# Patient Record
Sex: MALE | Race: WHITE | NOT HISPANIC OR LATINO | Employment: OTHER | URBAN - METROPOLITAN AREA
[De-identification: names, ages, dates, MRNs, and addresses within clinical notes are randomized per-mention and may not be internally consistent; named-entity substitution may affect disease eponyms.]

---

## 2017-01-13 ENCOUNTER — APPOINTMENT (OUTPATIENT)
Dept: LAB | Facility: CLINIC | Age: 62
End: 2017-01-13
Payer: COMMERCIAL

## 2017-01-13 DIAGNOSIS — I10 ESSENTIAL (PRIMARY) HYPERTENSION: ICD-10-CM

## 2017-01-13 DIAGNOSIS — E11.9 TYPE 2 DIABETES MELLITUS WITHOUT COMPLICATIONS (HCC): ICD-10-CM

## 2017-01-13 LAB
ALBUMIN SERPL BCP-MCNC: 3.9 G/DL (ref 3.5–5)
ALP SERPL-CCNC: 80 U/L (ref 46–116)
ALT SERPL W P-5'-P-CCNC: 29 U/L (ref 12–78)
ANION GAP SERPL CALCULATED.3IONS-SCNC: 6 MMOL/L (ref 4–13)
AST SERPL W P-5'-P-CCNC: 10 U/L (ref 5–45)
BILIRUB SERPL-MCNC: 0.53 MG/DL (ref 0.2–1)
BUN SERPL-MCNC: 20 MG/DL (ref 5–25)
CALCIUM SERPL-MCNC: 9 MG/DL (ref 8.3–10.1)
CHLORIDE SERPL-SCNC: 105 MMOL/L (ref 100–108)
CO2 SERPL-SCNC: 28 MMOL/L (ref 21–32)
CREAT SERPL-MCNC: 1.21 MG/DL (ref 0.6–1.3)
EST. AVERAGE GLUCOSE BLD GHB EST-MCNC: 157 MG/DL
GFR SERPL CREATININE-BSD FRML MDRD: >60 ML/MIN/1.73SQ M
GLUCOSE SERPL-MCNC: 163 MG/DL (ref 65–140)
HBA1C MFR BLD: 7.1 % (ref 4.2–6.3)
POTASSIUM SERPL-SCNC: 4.5 MMOL/L (ref 3.5–5.3)
PROT SERPL-MCNC: 7.4 G/DL (ref 6.4–8.2)
SODIUM SERPL-SCNC: 139 MMOL/L (ref 136–145)

## 2017-01-13 PROCEDURE — 36415 COLL VENOUS BLD VENIPUNCTURE: CPT

## 2017-01-13 PROCEDURE — 83036 HEMOGLOBIN GLYCOSYLATED A1C: CPT

## 2017-01-13 PROCEDURE — 80053 COMPREHEN METABOLIC PANEL: CPT

## 2017-01-16 ENCOUNTER — ALLSCRIPTS OFFICE VISIT (OUTPATIENT)
Dept: OTHER | Facility: OTHER | Age: 62
End: 2017-01-16

## 2017-05-16 ENCOUNTER — APPOINTMENT (OUTPATIENT)
Dept: LAB | Facility: CLINIC | Age: 62
End: 2017-05-16
Payer: COMMERCIAL

## 2017-05-16 DIAGNOSIS — I10 ESSENTIAL (PRIMARY) HYPERTENSION: ICD-10-CM

## 2017-05-16 DIAGNOSIS — E11.9 TYPE 2 DIABETES MELLITUS WITHOUT COMPLICATIONS (HCC): ICD-10-CM

## 2017-05-16 LAB
ALBUMIN SERPL BCP-MCNC: 3.8 G/DL (ref 3.5–5)
ALP SERPL-CCNC: 80 U/L (ref 46–116)
ALT SERPL W P-5'-P-CCNC: 22 U/L (ref 12–78)
ANION GAP SERPL CALCULATED.3IONS-SCNC: 5 MMOL/L (ref 4–13)
AST SERPL W P-5'-P-CCNC: 6 U/L (ref 5–45)
BASOPHILS # BLD AUTO: 0.03 THOUSANDS/ΜL (ref 0–0.1)
BASOPHILS NFR BLD AUTO: 1 % (ref 0–1)
BILIRUB SERPL-MCNC: 0.49 MG/DL (ref 0.2–1)
BILIRUB UR QL STRIP: NEGATIVE
BUN SERPL-MCNC: 15 MG/DL (ref 5–25)
CALCIUM SERPL-MCNC: 8.7 MG/DL (ref 8.3–10.1)
CHLORIDE SERPL-SCNC: 104 MMOL/L (ref 100–108)
CHOLEST SERPL-MCNC: 148 MG/DL (ref 50–200)
CLARITY UR: CLEAR
CO2 SERPL-SCNC: 29 MMOL/L (ref 21–32)
COLOR UR: YELLOW
CREAT SERPL-MCNC: 1.2 MG/DL (ref 0.6–1.3)
CREAT UR-MCNC: 184 MG/DL
EOSINOPHIL # BLD AUTO: 0.2 THOUSAND/ΜL (ref 0–0.61)
EOSINOPHIL NFR BLD AUTO: 4 % (ref 0–6)
ERYTHROCYTE [DISTWIDTH] IN BLOOD BY AUTOMATED COUNT: 13.8 % (ref 11.6–15.1)
EST. AVERAGE GLUCOSE BLD GHB EST-MCNC: 160 MG/DL
GFR SERPL CREATININE-BSD FRML MDRD: >60 ML/MIN/1.73SQ M
GLUCOSE P FAST SERPL-MCNC: 173 MG/DL (ref 65–99)
GLUCOSE UR STRIP-MCNC: NEGATIVE MG/DL
HBA1C MFR BLD: 7.2 % (ref 4.2–6.3)
HCT VFR BLD AUTO: 45.9 % (ref 36.5–49.3)
HDLC SERPL-MCNC: 28 MG/DL (ref 40–60)
HGB BLD-MCNC: 15.3 G/DL (ref 12–17)
HGB UR QL STRIP.AUTO: NEGATIVE
KETONES UR STRIP-MCNC: NEGATIVE MG/DL
LDLC SERPL CALC-MCNC: 84 MG/DL (ref 0–100)
LEUKOCYTE ESTERASE UR QL STRIP: NEGATIVE
LYMPHOCYTES # BLD AUTO: 2.2 THOUSANDS/ΜL (ref 0.6–4.47)
LYMPHOCYTES NFR BLD AUTO: 41 % (ref 14–44)
MCH RBC QN AUTO: 33.3 PG (ref 26.8–34.3)
MCHC RBC AUTO-ENTMCNC: 33.3 G/DL (ref 31.4–37.4)
MCV RBC AUTO: 100 FL (ref 82–98)
MICROALBUMIN UR-MCNC: 13.6 MG/L (ref 0–20)
MICROALBUMIN/CREAT 24H UR: 7 MG/G CREATININE (ref 0–30)
MONOCYTES # BLD AUTO: 0.5 THOUSAND/ΜL (ref 0.17–1.22)
MONOCYTES NFR BLD AUTO: 9 % (ref 4–12)
NEUTROPHILS # BLD AUTO: 2.45 THOUSANDS/ΜL (ref 1.85–7.62)
NEUTS SEG NFR BLD AUTO: 45 % (ref 43–75)
NITRITE UR QL STRIP: NEGATIVE
NRBC BLD AUTO-RTO: 0 /100 WBCS
PH UR STRIP.AUTO: 6 [PH] (ref 4.5–8)
PLATELET # BLD AUTO: 167 THOUSANDS/UL (ref 149–390)
PMV BLD AUTO: 10.2 FL (ref 8.9–12.7)
POTASSIUM SERPL-SCNC: 4.9 MMOL/L (ref 3.5–5.3)
PROT SERPL-MCNC: 6.9 G/DL (ref 6.4–8.2)
PROT UR STRIP-MCNC: NEGATIVE MG/DL
RBC # BLD AUTO: 4.6 MILLION/UL (ref 3.88–5.62)
SODIUM SERPL-SCNC: 138 MMOL/L (ref 136–145)
SP GR UR STRIP.AUTO: 1.02 (ref 1–1.03)
TRIGL SERPL-MCNC: 181 MG/DL
UROBILINOGEN UR QL STRIP.AUTO: 0.2 E.U./DL
WBC # BLD AUTO: 5.41 THOUSAND/UL (ref 4.31–10.16)

## 2017-05-16 PROCEDURE — 81003 URINALYSIS AUTO W/O SCOPE: CPT

## 2017-05-16 PROCEDURE — 82043 UR ALBUMIN QUANTITATIVE: CPT

## 2017-05-16 PROCEDURE — 80061 LIPID PANEL: CPT

## 2017-05-16 PROCEDURE — 80053 COMPREHEN METABOLIC PANEL: CPT

## 2017-05-16 PROCEDURE — 85025 COMPLETE CBC W/AUTO DIFF WBC: CPT

## 2017-05-16 PROCEDURE — 83036 HEMOGLOBIN GLYCOSYLATED A1C: CPT

## 2017-05-16 PROCEDURE — 36415 COLL VENOUS BLD VENIPUNCTURE: CPT

## 2017-05-16 PROCEDURE — 82570 ASSAY OF URINE CREATININE: CPT

## 2017-05-22 ENCOUNTER — ALLSCRIPTS OFFICE VISIT (OUTPATIENT)
Dept: OTHER | Facility: OTHER | Age: 62
End: 2017-05-22

## 2017-09-01 DIAGNOSIS — E11.9 TYPE 2 DIABETES MELLITUS WITHOUT COMPLICATIONS (HCC): ICD-10-CM

## 2017-09-01 DIAGNOSIS — I10 ESSENTIAL (PRIMARY) HYPERTENSION: ICD-10-CM

## 2018-01-13 VITALS
DIASTOLIC BLOOD PRESSURE: 90 MMHG | TEMPERATURE: 97.8 F | WEIGHT: 220.04 LBS | RESPIRATION RATE: 16 BRPM | HEART RATE: 79 BPM | SYSTOLIC BLOOD PRESSURE: 160 MMHG | HEIGHT: 68 IN | BODY MASS INDEX: 33.35 KG/M2

## 2018-01-14 VITALS
HEART RATE: 100 BPM | OXYGEN SATURATION: 98 % | WEIGHT: 219.13 LBS | DIASTOLIC BLOOD PRESSURE: 80 MMHG | HEIGHT: 68 IN | SYSTOLIC BLOOD PRESSURE: 160 MMHG | BODY MASS INDEX: 33.21 KG/M2

## 2018-01-18 NOTE — MISCELLANEOUS
Provider Comments  Provider Comments:   Pt was a n/s  SWP he forgot appt, pt did reschedule        Signatures   Electronically signed by : FABI Carty ; Nov  3 2016 10:45PM EST                       (Author)

## 2018-03-07 DIAGNOSIS — I10 HYPERTENSION, ESSENTIAL, BENIGN: ICD-10-CM

## 2018-03-07 DIAGNOSIS — F41.9 ANXIETY: Primary | ICD-10-CM

## 2018-03-07 RX ORDER — METOPROLOL TARTRATE 50 MG/1
TABLET, FILM COATED ORAL
Qty: 90 TABLET | Refills: 5 | Status: SHIPPED | OUTPATIENT
Start: 2018-03-07 | End: 2018-09-16 | Stop reason: SDUPTHER

## 2018-03-07 RX ORDER — ESCITALOPRAM OXALATE 20 MG/1
TABLET ORAL
Qty: 30 TABLET | Refills: 5 | Status: SHIPPED | OUTPATIENT
Start: 2018-03-07 | End: 2018-09-30 | Stop reason: SDUPTHER

## 2018-03-07 NOTE — TELEPHONE ENCOUNTER
Please call him to schedule a visit  I think we or he cancelled in January but has no f/u appt scheduled at this time

## 2018-03-08 DIAGNOSIS — E55.9 VITAMIN D DEFICIENCY: ICD-10-CM

## 2018-03-08 DIAGNOSIS — E66.9 OBESITY WITHOUT SERIOUS COMORBIDITY, UNSPECIFIED CLASSIFICATION, UNSPECIFIED OBESITY TYPE: ICD-10-CM

## 2018-03-08 DIAGNOSIS — E11.9 CONTROLLED TYPE 2 DIABETES MELLITUS WITHOUT COMPLICATION, WITHOUT LONG-TERM CURRENT USE OF INSULIN (HCC): Primary | ICD-10-CM

## 2018-03-08 DIAGNOSIS — I10 BENIGN ESSENTIAL HYPERTENSION: ICD-10-CM

## 2018-03-08 NOTE — TELEPHONE ENCOUNTER
I left a message on voice mail stating that the labs were ordered for blood work  I advised him to call if he needed the orders printed

## 2018-03-08 NOTE — TELEPHONE ENCOUNTER
I spoke with the patient and he made an appoiment for 03/30/18  He asked if you could order blood work  I will call him when complete    Thank You

## 2018-03-28 ENCOUNTER — APPOINTMENT (OUTPATIENT)
Dept: LAB | Facility: CLINIC | Age: 63
End: 2018-03-28
Payer: COMMERCIAL

## 2018-03-28 DIAGNOSIS — E11.9 TYPE 2 DIABETES MELLITUS WITHOUT COMPLICATIONS (HCC): ICD-10-CM

## 2018-03-28 DIAGNOSIS — I10 ESSENTIAL (PRIMARY) HYPERTENSION: ICD-10-CM

## 2018-03-28 LAB
25(OH)D3 SERPL-MCNC: 18.6 NG/ML (ref 30–100)
ALBUMIN SERPL BCP-MCNC: 3.6 G/DL (ref 3.5–5)
ALP SERPL-CCNC: 95 U/L (ref 46–116)
ALT SERPL W P-5'-P-CCNC: 18 U/L (ref 12–78)
ANION GAP SERPL CALCULATED.3IONS-SCNC: 5 MMOL/L (ref 4–13)
AST SERPL W P-5'-P-CCNC: 8 U/L (ref 5–45)
BASOPHILS # BLD AUTO: 0.02 THOUSANDS/ΜL (ref 0–0.1)
BASOPHILS NFR BLD AUTO: 0 % (ref 0–1)
BILIRUB SERPL-MCNC: 0.21 MG/DL (ref 0.2–1)
BUN SERPL-MCNC: 23 MG/DL (ref 5–25)
CALCIUM SERPL-MCNC: 9.1 MG/DL (ref 8.3–10.1)
CHLORIDE SERPL-SCNC: 109 MMOL/L (ref 100–108)
CHOLEST SERPL-MCNC: 164 MG/DL (ref 50–200)
CO2 SERPL-SCNC: 27 MMOL/L (ref 21–32)
CREAT SERPL-MCNC: 1.18 MG/DL (ref 0.6–1.3)
CREAT UR-MCNC: 169 MG/DL
EOSINOPHIL # BLD AUTO: 0.12 THOUSAND/ΜL (ref 0–0.61)
EOSINOPHIL NFR BLD AUTO: 2 % (ref 0–6)
ERYTHROCYTE [DISTWIDTH] IN BLOOD BY AUTOMATED COUNT: 14.2 % (ref 11.6–15.1)
EST. AVERAGE GLUCOSE BLD GHB EST-MCNC: 169 MG/DL
GFR SERPL CREATININE-BSD FRML MDRD: 66 ML/MIN/1.73SQ M
GLUCOSE P FAST SERPL-MCNC: 155 MG/DL (ref 65–99)
HBA1C MFR BLD: 7.5 % (ref 4.2–6.3)
HCT VFR BLD AUTO: 40.3 % (ref 36.5–49.3)
HDLC SERPL-MCNC: 32 MG/DL (ref 40–60)
HGB BLD-MCNC: 13.2 G/DL (ref 12–17)
LDLC SERPL CALC-MCNC: 105 MG/DL (ref 0–100)
LYMPHOCYTES # BLD AUTO: 2.07 THOUSANDS/ΜL (ref 0.6–4.47)
LYMPHOCYTES NFR BLD AUTO: 30 % (ref 14–44)
MCH RBC QN AUTO: 32.3 PG (ref 26.8–34.3)
MCHC RBC AUTO-ENTMCNC: 32.8 G/DL (ref 31.4–37.4)
MCV RBC AUTO: 99 FL (ref 82–98)
MICROALBUMIN UR-MCNC: 15.6 MG/L (ref 0–20)
MICROALBUMIN/CREAT 24H UR: 9 MG/G CREATININE (ref 0–30)
MONOCYTES # BLD AUTO: 0.55 THOUSAND/ΜL (ref 0.17–1.22)
MONOCYTES NFR BLD AUTO: 8 % (ref 4–12)
NEUTROPHILS # BLD AUTO: 4.11 THOUSANDS/ΜL (ref 1.85–7.62)
NEUTS SEG NFR BLD AUTO: 60 % (ref 43–75)
NRBC BLD AUTO-RTO: 0 /100 WBCS
PLATELET # BLD AUTO: 185 THOUSANDS/UL (ref 149–390)
PMV BLD AUTO: 10.2 FL (ref 8.9–12.7)
POTASSIUM SERPL-SCNC: 4.6 MMOL/L (ref 3.5–5.3)
PROT SERPL-MCNC: 7.9 G/DL (ref 6.4–8.2)
RBC # BLD AUTO: 4.09 MILLION/UL (ref 3.88–5.62)
SODIUM SERPL-SCNC: 141 MMOL/L (ref 136–145)
TRIGL SERPL-MCNC: 133 MG/DL
WBC # BLD AUTO: 6.88 THOUSAND/UL (ref 4.31–10.16)

## 2018-03-28 PROCEDURE — 3061F NEG MICROALBUMINURIA REV: CPT | Performed by: INTERNAL MEDICINE

## 2018-03-28 PROCEDURE — 82043 UR ALBUMIN QUANTITATIVE: CPT | Performed by: INTERNAL MEDICINE

## 2018-03-28 PROCEDURE — 36415 COLL VENOUS BLD VENIPUNCTURE: CPT | Performed by: INTERNAL MEDICINE

## 2018-03-28 PROCEDURE — 83036 HEMOGLOBIN GLYCOSYLATED A1C: CPT | Performed by: INTERNAL MEDICINE

## 2018-03-28 PROCEDURE — 82570 ASSAY OF URINE CREATININE: CPT | Performed by: INTERNAL MEDICINE

## 2018-03-28 PROCEDURE — 82306 VITAMIN D 25 HYDROXY: CPT | Performed by: INTERNAL MEDICINE

## 2018-03-28 PROCEDURE — 80053 COMPREHEN METABOLIC PANEL: CPT | Performed by: INTERNAL MEDICINE

## 2018-03-28 PROCEDURE — 85025 COMPLETE CBC W/AUTO DIFF WBC: CPT | Performed by: INTERNAL MEDICINE

## 2018-03-28 PROCEDURE — 80061 LIPID PANEL: CPT

## 2018-03-30 ENCOUNTER — OFFICE VISIT (OUTPATIENT)
Dept: INTERNAL MEDICINE CLINIC | Facility: CLINIC | Age: 63
End: 2018-03-30
Payer: COMMERCIAL

## 2018-03-30 VITALS
SYSTOLIC BLOOD PRESSURE: 126 MMHG | BODY MASS INDEX: 32.86 KG/M2 | HEART RATE: 79 BPM | HEIGHT: 68 IN | DIASTOLIC BLOOD PRESSURE: 80 MMHG | OXYGEN SATURATION: 98 % | WEIGHT: 216.8 LBS

## 2018-03-30 DIAGNOSIS — E66.9 OBESITY WITHOUT SERIOUS COMORBIDITY, UNSPECIFIED CLASSIFICATION, UNSPECIFIED OBESITY TYPE: ICD-10-CM

## 2018-03-30 DIAGNOSIS — G89.29 CHRONIC PAIN OF RIGHT KNEE: ICD-10-CM

## 2018-03-30 DIAGNOSIS — M25.561 CHRONIC PAIN OF RIGHT KNEE: ICD-10-CM

## 2018-03-30 DIAGNOSIS — F17.200 CURRENT SMOKER: ICD-10-CM

## 2018-03-30 DIAGNOSIS — E78.5 DYSLIPIDEMIA: ICD-10-CM

## 2018-03-30 DIAGNOSIS — E11.9 CONTROLLED TYPE 2 DIABETES MELLITUS WITHOUT COMPLICATION, WITHOUT LONG-TERM CURRENT USE OF INSULIN (HCC): Primary | ICD-10-CM

## 2018-03-30 DIAGNOSIS — I10 BENIGN ESSENTIAL HYPERTENSION: ICD-10-CM

## 2018-03-30 DIAGNOSIS — F41.1 GENERALIZED ANXIETY DISORDER: ICD-10-CM

## 2018-03-30 DIAGNOSIS — E55.9 VITAMIN D DEFICIENCY: ICD-10-CM

## 2018-03-30 PROCEDURE — 99214 OFFICE O/P EST MOD 30 MIN: CPT | Performed by: INTERNAL MEDICINE

## 2018-03-30 PROCEDURE — 3008F BODY MASS INDEX DOCD: CPT | Performed by: INTERNAL MEDICINE

## 2018-03-30 PROCEDURE — 3079F DIAST BP 80-89 MM HG: CPT | Performed by: INTERNAL MEDICINE

## 2018-03-30 PROCEDURE — 3074F SYST BP LT 130 MM HG: CPT | Performed by: INTERNAL MEDICINE

## 2018-03-30 RX ORDER — BLOOD-GLUCOSE METER
KIT MISCELLANEOUS
COMMUNITY
Start: 2016-04-14 | End: 2020-05-22 | Stop reason: SDUPTHER

## 2018-03-30 RX ORDER — ATORVASTATIN CALCIUM 20 MG/1
20 TABLET, FILM COATED ORAL DAILY
Qty: 90 TABLET | Refills: 1 | Status: SHIPPED | OUTPATIENT
Start: 2018-03-30 | End: 2018-09-30 | Stop reason: SDUPTHER

## 2018-03-30 RX ORDER — ERGOCALCIFEROL (VITAMIN D2) 1250 MCG
50000 CAPSULE ORAL WEEKLY
Qty: 8 CAPSULE | Refills: 0 | Status: SHIPPED | OUTPATIENT
Start: 2018-03-30 | End: 2019-04-10 | Stop reason: ALTCHOICE

## 2018-03-30 RX ORDER — LANCETS 33 GAUGE
EACH MISCELLANEOUS
COMMUNITY
Start: 2017-09-29 | End: 2020-06-01 | Stop reason: SDUPTHER

## 2018-03-30 RX ORDER — LANCETS
EACH MISCELLANEOUS
COMMUNITY
Start: 2016-04-14 | End: 2020-05-18 | Stop reason: SDUPTHER

## 2018-03-30 RX ORDER — FLUTICASONE PROPIONATE 50 MCG
1 SPRAY, SUSPENSION (ML) NASAL 2 TIMES DAILY
COMMUNITY
Start: 2015-04-21 | End: 2020-10-26 | Stop reason: ALTCHOICE

## 2018-03-30 RX ORDER — ASPIRIN 81 MG/1
1 TABLET, CHEWABLE ORAL DAILY
COMMUNITY

## 2018-03-30 NOTE — PROGRESS NOTES
Assessment/Plan:       Problem List Items Addressed This Visit     Benign essential hypertension     Controlled  Cough from ACE, allergy to ARB marked in previous chart         Relevant Orders    CBC    Comprehensive metabolic panel    Controlled diabetes mellitus (HCC) - Primary     Low sugar carb diet         Relevant Medications    metFORMIN (GLUCOPHAGE) 500 mg tablet    atorvastatin (LIPITOR) 20 mg tablet    Other Relevant Orders    CBC    Comprehensive metabolic panel    HEMOGLOBIN A1C W/ EAG ESTIMATION    Obesity    Vitamin D deficiency     Start 50k weekly x 8 then 1000 units a day         Relevant Medications    ergocalciferol (ERGOCALCIFEROL) 54506 units capsule    Dyslipidemia     Start statin         Relevant Medications    atorvastatin (LIPITOR) 20 mg tablet    Other Relevant Orders    Lipid panel    Chronic pain of right knee    Relevant Orders    XR knee 3 vw right non injury    Generalized anxiety disorder    Current smoker     precontemplative                 Subjective:      Patient ID: Parveen Rose is a 65 y o  male  HPI  Mother has been ill in and out of hospital   Now at home with hospice  A1C 7 5  vit d 18 6  Right knee swelling and pain and stiffness x 5 weeks  2/10, takes Tylenol prn  Pain with weight bearing    The following portions of the patient's history were reviewed and updated as appropriate: allergies, current medications, past family history, past medical history, past social history, past surgical history and problem list     Review of Systems   Constitutional: Negative for fatigue, fever and unexpected weight change  HENT: Negative for sinus pain, sinus pressure and sore throat  Respiratory: Negative for cough, shortness of breath and wheezing  Cardiovascular: Negative for chest pain, palpitations and leg swelling  Gastrointestinal: Negative for abdominal pain, constipation, diarrhea, nausea and vomiting  Endocrine: Negative for polydipsia and polyuria  Genitourinary: Negative for difficulty urinating  Musculoskeletal: Positive for arthralgias (right knee)  Negative for myalgias  Neurological: Negative for dizziness, numbness (long h/o numbness resolved completely) and headaches  Objective:    /80   Pulse 79   Ht 5' 8" (1 727 m)   Wt 98 3 kg (216 lb 12 8 oz)   SpO2 98%   BMI 32 96 kg/m²        Physical Exam   Constitutional: He is oriented to person, place, and time  He appears well-developed and well-nourished  HENT:   Head: Normocephalic and atraumatic  Right Ear: External ear normal    Left Ear: External ear normal    Mouth/Throat: Oropharynx is clear and moist    Eyes: Conjunctivae are normal    Neck: Neck supple  Cardiovascular: Normal rate, regular rhythm and normal heart sounds  No murmur heard  Pulmonary/Chest: Effort normal and breath sounds normal  No respiratory distress  He has no wheezes  He has no rales  Abdominal: Soft  Bowel sounds are normal  He exhibits no distension and no mass  There is no tenderness  There is no rebound and no guarding  Musculoskeletal: Normal range of motion  Neurological: He is alert and oriented to person, place, and time  Skin: Skin is warm and dry  Psychiatric: He has a normal mood and affect   His behavior is normal  Judgment and thought content normal

## 2018-03-30 NOTE — PATIENT INSTRUCTIONS
DASH Eating Plan   AMBULATORY CARE:   The DASH (Dietary Approaches to Stop Hypertension) Eating Plan  is designed to help prevent or lower high blood pressure  It can also help to lower LDL (bad) cholesterol and decrease your risk for heart disease  The plan is low in sodium, sugar, unhealthy fats, and total fat  It is high in potassium, calcium, magnesium, and fiber  These nutrients are added when you eat more fruits, vegetables, and whole grains  Your sodium limit each day: Your dietitian will tell you how much sodium is safe for you to have each day  People with high blood pressure should have no more than 1,500 to 2,300 mg of sodium in a day  A teaspoon (tsp) of salt has 2,300 mg of sodium  This may seem like a difficult goal, but small changes to the foods you eat can make a big difference  Your healthcare provider or dietitian can help you create a meal plan that follows your sodium limit  How to limit sodium:   · Read food labels  Food labels can help you choose foods that are low in sodium  The amount of sodium is listed in milligrams (mg)  The % Daily Value (DV) column tells you how much of your daily needs are met by 1 serving of the food for each nutrient listed  Choose foods that have less than 5% of the DV of sodium  These foods are considered low in sodium  Foods that have 20% or more of the DV of sodium are considered high in sodium  Avoid foods that have more than 300 mg of sodium in each serving  Choose foods that say low-sodium, reduced-sodium, or no salt added on the food label  · Avoid salt  Do not salt food at the table, and add very little salt to foods during cooking  Use herbs and spices, such as onions, garlic, and salt-free seasonings to add flavor to foods  Try lemon or lime juice or vinegar to give foods a tart flavor  Use hot peppers or a small amount of hot pepper sauce to add a spicy flavor to foods  · Ask about salt substitutes    Ask your healthcare provider if you may use salt substitutes  Some salt substitutes have ingredients that can be harmful if you have certain health conditions  · Choose foods carefully at restaurants  Meals from restaurants, especially fast food restaurants, are often high in sodium  Some restaurants have nutrition information that tells you the amount of sodium in their foods  Ask to have your food prepared with less, or no salt  What you need to know about fats:   · Include healthy fats  Examples are unsaturated fats and omega-3 fatty acids  Unsaturated fats are found in soybean, canola, olive, or sunflower oil, and liquid and soft tub margarines  Omega-3 fatty acids are found in fatty fish, such as salmon, tuna, mackerel, and sardines  It is also found in flaxseed oil and ground flaxseed  · Avoid unhealthy fats  Do not eat unhealthy fats, such as saturated fats and trans fats  Saturated fats are found in foods that contain fat from animals  Examples are fatty meats, whole milk, butter, cream, and other dairy foods  It is also found in shortening, stick margarine, palm oil, and coconut oil  Trans fats are found in fried foods, crackers, chips, and baked goods made with margarine or shortening  Foods to include: With the DASH eating plan, you need to eat a certain number of servings from each food group  This will help you get enough of certain nutrients and limit others  The amount of servings you should eat depends on how many calories you need  Your dietitian can tell you how many calories you need  The number of servings listed next to the food groups below are for people who need about 2,000 calories each day    · Grains:  6 to 8 servings (3 of these servings should be whole-grain foods)    ¨ 1 slice of whole-grain bread     ¨ 1 ounce of dry cereal    ¨ ½ cup of cooked cereal, pasta, or brown rice    · Vegetables and fruits:  4 to 5 servings of fruits and 4 to 5 servings of vegetables    ¨ 1 medium fruit    ¨ ½ cup of frozen, canned (no added salt), or chopped fresh vegetables     ¨ ½ cup of fresh, frozen, dried, or canned fruit (canned in light syrup or fruit juice)    ¨ ½ cup of vegetable or fruit juice    · Dairy:  2 to 3 servings    ¨ 1 cup of nonfat (skim) or 1% milk    ¨ 1½ ounces of fat-free or low-fat cheese    ¨ 6 ounces of nonfat or low-fat yogurt    · Lean meat, poultry, and fish:  6 ounces or less    [employer] (chicken, turkey) with no skin    ¨ Fish (especially fatty fish, such as salmon, fresh tuna, or mackerel)    ¨ Lean beef and pork (loin, round, extra lean hamburger)    ¨ Egg whites and egg substitutes    · Nuts, seeds, and legumes:  4 to 5 servings each week    ¨ ½ cup of cooked beans and peas    ¨ 1½ ounces of unsalted nuts    ¨ 2 tablespoons of peanut butter or seeds    · Sweets and added sugars:  5 or less each week    ¨ 1 tablespoon of sugar, jelly, or jam    ¨ ½ cup of sorbet or gelatin    ¨ 1 cup of lemonade    · Fats:  2 to 3 servings each week    ¨ 1 teaspoon of soft margarine or vegetable oil    ¨ 1 tablespoon of mayonnaise    ¨ 2 tablespoons of salad dressing  Foods to avoid:   · Grains:      [employer], such as doughnuts, pastries, cookies, and biscuits (high in fat and sugar)    ¨ Mixes for cornbread and biscuits, packaged foods, such as bread stuffing, rice and pasta mixes, macaroni and cheese, and instant cereals (high in sodium)    · Fruits and vegetables:      ¨ Regular, canned vegetables (high in sodium)    ¨ Sauerkraut, pickled vegetables, and other foods prepared in brine (high in sodium)    ¨ Fried vegetables or vegetables in butter or high-fat sauces    ¨ Fruit in cream or butter sauce (high in fat)    · Dairy:      ¨ Whole milk, 2% milk, and cream (high in fat)    ¨ Regular cheese and processed cheese (high in fat and sodium)    · Meats and protein foods:      ¨ Smoked or cured meat, such as corned beef, thompson, ham, hot dogs, and sausage (high in fat and sodium)    ¨ Canned beans and canned meats or spreads, such as potted meats, sardines, anchovies, and imitation seafood (high in sodium)    ¨ Deli or lunch meats, such as bologna, ham, turkey, and roast beef (high in sodium)    ¨ High-fat meat (T-bone steak, regular hamburger, and ribs)    ¨ Whole eggs and egg yolks (high in fat)    · Other:      ¨ Seasonings made with salt, such as garlic salt, celery salt, onion salt, seasoned salt, meat tenderizers, and monosodium glutamate (MSG)    ¨ Miso soup and canned or dried soup mixes (high in sodium)    ¨ Regular soy sauce, barbecue sauce, teriyaki sauce, steak sauce, Worcestershire sauce, and most flavored vinegars (high in sodium)    ¨ Regular condiments, such as mustard, ketchup, and salad dressings (high in sodium)    ¨ Gravy and sauces, such as Jason or cheese sauces (high in sodium and fat)    ¨ Drinks high in sugar, such as soda or fruit drinks    [employer] foods, such as salted chips, popcorn, pretzels, pork rinds, salted crackers, and salted nuts    ¨ Frozen foods, such as dinners, entrees, vegetables with sauces, and breaded meats (high in sodium)  Other guidelines to follow:   · Maintain a healthy weight  Your risk for heart disease is higher if you are overweight  Your healthcare provider may suggest that you lose weight if you are overweight  You can lose weight by eating fewer calories and foods that have added sugars and fat  The DASH meal plan can help you do this  Decrease calories by eating smaller portions at each meal and fewer snacks  Ask your healthcare provider for more information about how to lose weight  · Exercise regularly  Regular exercise can help you reach or maintain a healthy weight  Regular exercise can also help decrease your blood pressure and improve your cholesterol levels  Get 30 minutes or more of moderate exercise each day of the week  To lose weight, get at least 60 minutes of exercise  Talk to your healthcare provider about the best exercise program for you      · Limit alcohol  Women should limit alcohol to 1 drink a day  Men should limit alcohol to 2 drinks a day  A drink of alcohol is 12 ounces of beer, 5 ounces of wine, or 1½ ounces of liquor  © 2017 [address] Information is for End User's use only and may not be sold, redistributed or otherwise used for commercial purposes  All illustrations and images included in CareNotes® are the copyrighted property of [employer] , [employer]  or Martínez Zavala  The above information is an  only  It is not intended as medical advice for individual conditions or treatments  Talk to your doctor, nurse or pharmacist before following any medical regimen to see if it is safe and effective for you

## 2018-04-05 DIAGNOSIS — E11.9 CONTROLLED TYPE 2 DIABETES MELLITUS WITHOUT COMPLICATION, WITHOUT LONG-TERM CURRENT USE OF INSULIN (HCC): Primary | ICD-10-CM

## 2018-09-16 DIAGNOSIS — I10 HYPERTENSION, ESSENTIAL, BENIGN: ICD-10-CM

## 2018-09-17 RX ORDER — METOPROLOL TARTRATE 50 MG/1
TABLET, FILM COATED ORAL
Qty: 90 TABLET | Refills: 5 | Status: SHIPPED | OUTPATIENT
Start: 2018-09-17 | End: 2019-03-28 | Stop reason: SDUPTHER

## 2018-09-30 DIAGNOSIS — E11.9 CONTROLLED TYPE 2 DIABETES MELLITUS WITHOUT COMPLICATION, WITHOUT LONG-TERM CURRENT USE OF INSULIN (HCC): ICD-10-CM

## 2018-09-30 DIAGNOSIS — F41.9 ANXIETY: ICD-10-CM

## 2018-09-30 DIAGNOSIS — E78.5 DYSLIPIDEMIA: ICD-10-CM

## 2018-10-01 RX ORDER — ESCITALOPRAM OXALATE 20 MG/1
TABLET ORAL
Qty: 90 TABLET | Refills: 1 | Status: SHIPPED | OUTPATIENT
Start: 2018-10-01 | End: 2019-04-29 | Stop reason: SDUPTHER

## 2018-10-01 RX ORDER — ATORVASTATIN CALCIUM 20 MG/1
20 TABLET, FILM COATED ORAL DAILY
Qty: 90 TABLET | Refills: 1 | Status: SHIPPED | OUTPATIENT
Start: 2018-10-01 | End: 2019-04-09 | Stop reason: SDUPTHER

## 2018-10-18 DIAGNOSIS — E11.9 CONTROLLED TYPE 2 DIABETES MELLITUS WITHOUT COMPLICATION, WITHOUT LONG-TERM CURRENT USE OF INSULIN (HCC): ICD-10-CM

## 2018-10-31 ENCOUNTER — APPOINTMENT (OUTPATIENT)
Dept: LAB | Facility: CLINIC | Age: 63
End: 2018-10-31
Payer: COMMERCIAL

## 2018-10-31 DIAGNOSIS — E11.9 CONTROLLED TYPE 2 DIABETES MELLITUS WITHOUT COMPLICATION, WITHOUT LONG-TERM CURRENT USE OF INSULIN (HCC): ICD-10-CM

## 2018-10-31 DIAGNOSIS — I10 BENIGN ESSENTIAL HYPERTENSION: ICD-10-CM

## 2018-10-31 DIAGNOSIS — E78.5 DYSLIPIDEMIA: ICD-10-CM

## 2018-10-31 LAB
ALBUMIN SERPL BCP-MCNC: 3.9 G/DL (ref 3.5–5)
ALP SERPL-CCNC: 135 U/L (ref 46–116)
ALT SERPL W P-5'-P-CCNC: 26 U/L (ref 12–78)
ANION GAP SERPL CALCULATED.3IONS-SCNC: 6 MMOL/L (ref 4–13)
AST SERPL W P-5'-P-CCNC: 8 U/L (ref 5–45)
BILIRUB SERPL-MCNC: 0.52 MG/DL (ref 0.2–1)
BUN SERPL-MCNC: 20 MG/DL (ref 5–25)
CALCIUM SERPL-MCNC: 8.9 MG/DL (ref 8.3–10.1)
CHLORIDE SERPL-SCNC: 100 MMOL/L (ref 100–108)
CHOLEST SERPL-MCNC: 107 MG/DL (ref 50–200)
CO2 SERPL-SCNC: 27 MMOL/L (ref 21–32)
CREAT SERPL-MCNC: 1.25 MG/DL (ref 0.6–1.3)
ERYTHROCYTE [DISTWIDTH] IN BLOOD BY AUTOMATED COUNT: 13.1 % (ref 11.6–15.1)
EST. AVERAGE GLUCOSE BLD GHB EST-MCNC: 249 MG/DL
GFR SERPL CREATININE-BSD FRML MDRD: 61 ML/MIN/1.73SQ M
GLUCOSE P FAST SERPL-MCNC: 260 MG/DL (ref 65–99)
HBA1C MFR BLD: 10.3 % (ref 4.2–6.3)
HCT VFR BLD AUTO: 42.9 % (ref 36.5–49.3)
HDLC SERPL-MCNC: 26 MG/DL (ref 40–60)
HGB BLD-MCNC: 13.9 G/DL (ref 12–17)
LDLC SERPL CALC-MCNC: 37 MG/DL (ref 0–100)
MCH RBC QN AUTO: 33.2 PG (ref 26.8–34.3)
MCHC RBC AUTO-ENTMCNC: 32.4 G/DL (ref 31.4–37.4)
MCV RBC AUTO: 102 FL (ref 82–98)
NONHDLC SERPL-MCNC: 81 MG/DL
PLATELET # BLD AUTO: 159 THOUSANDS/UL (ref 149–390)
PMV BLD AUTO: 9.9 FL (ref 8.9–12.7)
POTASSIUM SERPL-SCNC: 4.7 MMOL/L (ref 3.5–5.3)
PROT SERPL-MCNC: 7.5 G/DL (ref 6.4–8.2)
RBC # BLD AUTO: 4.19 MILLION/UL (ref 3.88–5.62)
SODIUM SERPL-SCNC: 133 MMOL/L (ref 136–145)
TRIGL SERPL-MCNC: 218 MG/DL
WBC # BLD AUTO: 6.28 THOUSAND/UL (ref 4.31–10.16)

## 2018-10-31 PROCEDURE — 80053 COMPREHEN METABOLIC PANEL: CPT

## 2018-10-31 PROCEDURE — 80061 LIPID PANEL: CPT

## 2018-10-31 PROCEDURE — 85027 COMPLETE CBC AUTOMATED: CPT

## 2018-10-31 PROCEDURE — 83036 HEMOGLOBIN GLYCOSYLATED A1C: CPT

## 2018-10-31 PROCEDURE — 36415 COLL VENOUS BLD VENIPUNCTURE: CPT

## 2018-11-02 ENCOUNTER — OFFICE VISIT (OUTPATIENT)
Dept: INTERNAL MEDICINE CLINIC | Facility: CLINIC | Age: 63
End: 2018-11-02
Payer: COMMERCIAL

## 2018-11-02 VITALS
WEIGHT: 219.8 LBS | OXYGEN SATURATION: 99 % | HEART RATE: 93 BPM | BODY MASS INDEX: 33.31 KG/M2 | HEIGHT: 68 IN | SYSTOLIC BLOOD PRESSURE: 154 MMHG | DIASTOLIC BLOOD PRESSURE: 84 MMHG

## 2018-11-02 DIAGNOSIS — Z12.11 SCREENING FOR COLON CANCER: ICD-10-CM

## 2018-11-02 DIAGNOSIS — I10 BENIGN ESSENTIAL HYPERTENSION: ICD-10-CM

## 2018-11-02 DIAGNOSIS — E11.9 CONTROLLED TYPE 2 DIABETES MELLITUS WITHOUT COMPLICATION, WITHOUT LONG-TERM CURRENT USE OF INSULIN (HCC): ICD-10-CM

## 2018-11-02 DIAGNOSIS — E66.9 OBESITY WITHOUT SERIOUS COMORBIDITY, UNSPECIFIED CLASSIFICATION, UNSPECIFIED OBESITY TYPE: ICD-10-CM

## 2018-11-02 DIAGNOSIS — IMO0001 UNCONTROLLED DIABETES MELLITUS TYPE 2 WITHOUT COMPLICATIONS: Primary | ICD-10-CM

## 2018-11-02 DIAGNOSIS — F41.1 GENERALIZED ANXIETY DISORDER: ICD-10-CM

## 2018-11-02 DIAGNOSIS — E78.5 DYSLIPIDEMIA: ICD-10-CM

## 2018-11-02 DIAGNOSIS — Z23 NEED FOR PNEUMOCOCCAL VACCINATION: ICD-10-CM

## 2018-11-02 DIAGNOSIS — Z23 NEED FOR INFLUENZA VACCINATION: ICD-10-CM

## 2018-11-02 PROCEDURE — 90472 IMMUNIZATION ADMIN EACH ADD: CPT

## 2018-11-02 PROCEDURE — 90682 RIV4 VACC RECOMBINANT DNA IM: CPT

## 2018-11-02 PROCEDURE — 90471 IMMUNIZATION ADMIN: CPT

## 2018-11-02 PROCEDURE — 90732 PPSV23 VACC 2 YRS+ SUBQ/IM: CPT

## 2018-11-02 PROCEDURE — 3008F BODY MASS INDEX DOCD: CPT | Performed by: INTERNAL MEDICINE

## 2018-11-02 PROCEDURE — 99214 OFFICE O/P EST MOD 30 MIN: CPT | Performed by: INTERNAL MEDICINE

## 2018-11-02 NOTE — PATIENT INSTRUCTIONS
Calorie Counting Diet   WHAT YOU NEED TO KNOW:   What is a calorie counting diet? It is a meal plan based on counting calories each day to reach a healthy body weight  You will need to eat fewer calories if you are trying to lose weight  Weight loss may decrease your risk for certain health problems or improve your health if you have health problems  Some of these health problems include heart disease, high blood pressure, and diabetes  What foods should I avoid? Your dietitian will tell you if you need to avoid certain foods based on your body weight and health condition  You may need to avoid high-fat foods if you are at risk for or have heart disease  You may need to eat fewer foods from the breads and starches food group if you have diabetes  How many calories are in foods? The following is a list of foods and drinks with the approximate number of calories in each  Check the food label to find the exact number of calories  A dietitian can tell you how many calories you should have from each food group each day    · Carbohydrate:      ¨ ½ of a 3-inch bagel, 1 slice of bread, or ½ of a hamburger bun or hot dog bun (80)    ¨ 1 (8-inch) flour tortilla or ½ cup of cooked rice (100)    ¨ 1 (6-inch) corn tortilla (80)    ¨ 1 (6-inch) pancake or 1 cup of bran flakes cereal (110)    ¨ ½ cup of cooked cereal (80)    ¨ ½ cup of cooked pasta (85)    ¨ 1 ounce of pretzels (100)    ¨ 3 cups of air-popped popcorn without butter or oil (80)    · Dairy:      ¨ 1 cup of skim or 1% milk (90)    ¨ 1 cup of 2% milk (120)    ¨ 1 cup of whole milk (160)    ¨ 1 cup of 2% chocolate milk (220)    ¨ 1 ounce of low-fat cheese with 3 grams of fat per ounce (70)    ¨ 1 ounce of cheddar cheese (114)    ¨ ½ cup of 1% fat cottage cheese (80)    ¨ 1 cup of plain or sugar-free, fat-free yogurt (90)    · Protein foods:      ¨ 3 ounces of fish (not breaded or fried) (95)    ¨ 3 ounces of breaded, fried fish (195)    ¨ ¾ cup of tuna canned in water (105)    ¨ 3 ounces of chicken breast without skin (105)    ¨ 1 fried chicken breast with skin (350)    ¨ ¼ cup of fat free egg substitute (40)    ¨ 1 large egg (75)    ¨ 3 ounces of lean beef or pork (165)    ¨ 3 ounces of fried pork chop or ham (185)    ¨ ½ cup of cooked dried beans, such as kidney, lal, lentils, or navy (115)    ¨ 3 ounces of bologna or lunch meat (225)    ¨ 2 links of breakfast sausage (140)    · Vegetables:      ¨ ½ cup of sliced mushrooms (10)    ¨ 1 cup of salad greens, such as lettuce, spinach, or lauren (15)    ¨ ½ cup of steamed asparagus (20)    ¨ ½ cup of cooked summer squash, zucchini squash, or green or wax beans (25)    ¨ 1 cup of broccoli or cauliflower florets, or 1 medium tomato (25)    ¨ 1 large raw carrot or ½ cup of cooked carrots (40)    ¨ ? of a medium cucumber or 1 stalk of celery (5)    ¨ 1 small baked potato (160)    ¨ 1 cup of breaded, fried vegetables (230)    · Fruit:      ¨ 1 (6-inch) banana (55)     ¨ ½ of a 4-inch grapefruit (55)    ¨ 15 grapes (60)    ¨ 1 medium orange or apple (70)    ¨ 1 large peach (65)    ¨ 1 cup of fresh pineapple chunks (75)    ¨ 1 cup of melon cubes (50)    ¨ 1¼ cups of whole strawberries (45)    ¨ ½ cup of fruit canned in juice (55)    ¨ ½ cup of fruit canned in heavy syrup (110)    ¨ ?  cup of raisins (130)    ¨ ½ cup of unsweetened fruit juice (60)    ¨ ½ cup of grape, cranberry, or prune juice (90)    · Fat:      ¨ 10 peanuts or 2 teaspoons of peanut butter (55)    ¨ 2 tablespoons of avocado or 1 tablespoon of regular salad dressing (45)    ¨ 2 slices of thompson (90)    ¨ 1 teaspoon of oil, such as safflower, canola, corn, or olive oil (45)    ¨ 2 teaspoons of low-fat margarine, or 1 tablespoon of low-fat mayonnaise (50)    ¨ 1 teaspoon of regular margarine (40)    ¨ 1 tablespoon of regular mayonnaise (135)    ¨ 1 tablespoon of cream cheese or 2 tablespoons of low-fat cream cheese (45)    ¨ 2 tablespoons of vegetable shortening (215)    · Dessert and sweets:      ¨ 8 animal crackers or 5 vanilla wafers (80)    ¨ 1 frozen fruit juice bar (80)    ¨ ½ cup of ice milk or low-fat frozen yogurt (90)    ¨ ½ cup of sherbet or sorbet (125)    ¨ ½ cup of sugar-free pudding or custard (60)    ¨ ½ cup of ice cream (140)    ¨ ½ cup of pudding or custard (175)    ¨ 1 (2-inch) square chocolate brownie (185)    · Combination foods:      ¨ Bean burrito made with an 8-inch tortilla, without cheese (275)    ¨ Chicken breast sandwich with lettuce and tomato (325)    ¨ 1 cup of chicken noodle soup (60)    ¨ 1 beef taco (175)    ¨ Regular hamburger with lettuce and tomato (310)    ¨ Regular cheeseburger with lettuce and tomato (410)     ¨ ¼ of a 12-inch cheese pizza (280)    ¨ Fried fish sandwich with lettuce and tomato (425)    ¨ Hot dog and bun (275)    ¨ 1½ cups of macaroni and cheese (310)    ¨ Taco salad with a fried tortilla shell (870)    · Low-calorie foods:      ¨ 1 tablespoon of ketchup or 1 tablespoon of fat free sour cream (15)    ¨ 1 teaspoon of mustard (5)    ¨ ¼ cup of salsa (20)    ¨ 1 large dill pickle (15)    ¨ 1 tablespoon of fat free salad dressing (10)    ¨ 2 teaspoons of low-sugar, light jam or jelly, or 1 tablespoon of sugar-free syrup (15)    ¨ 1 sugar-free popsicle (15)    ¨ 1 cup of club soda, seltzer water, or diet soda (0)  CARE AGREEMENT:   You have the right to help plan your care  Discuss treatment options with your caregivers to decide what care you want to receive  You always have the right to refuse treatment  The above information is an  only  It is not intended as medical advice for individual conditions or treatments  Talk to your doctor, nurse or pharmacist before following any medical regimen to see if it is safe and effective for you  © 2017 [address] [employer] Information is for End User's use only and may not be sold, redistributed or otherwise used for commercial purposes   All illustrations and images included in CareNotes® are the copyrighted property of [employer]  or Martínez Zavala

## 2018-11-02 NOTE — PROGRESS NOTES
Assessment/Plan:    Increase metformin to 1g BID  Low carb, starch diet   Stop eating processed food  LDL controlled , trig high which will improve with the diabetes diet  Cont atorvastatin  Try warm compress for left sided neck pain     Problem List Items Addressed This Visit        Endocrine    Uncontrolled diabetes mellitus type 2 without complications (HCC) - Primary    Relevant Medications    metFORMIN (GLUCOPHAGE) 500 mg tablet    Other Relevant Orders    CBC and differential    Comprehensive metabolic panel    Hemoglobin A1C    Microalbumin / creatinine urine ratio    TSH, 3rd generation with Free T4 reflex       Cardiovascular and Mediastinum    Benign essential hypertension    Relevant Orders    TSH, 3rd generation with Free T4 reflex       Other    Obesity    Dyslipidemia    Relevant Orders    Lipid panel    TSH, 3rd generation with Free T4 reflex    Generalized anxiety disorder      Other Visit Diagnoses     Need for influenza vaccination        Relevant Orders    influenza vaccine, 3635-7445, quadrivalent, recombinant, PF, 0 5 mL, for patients 18 yr+ (FLUBLOK) (Completed)    Need for pneumococcal vaccination        Relevant Orders    PNEUMOCOCCAL POLYSACCHARIDE VACCINE 23-VALENT =>3YO SQ IM (Completed)    Screening for colon cancer        Relevant Orders    Ambulatory referral to Gastroenterology            Subjective:      Patient ID: Lucía Doan is a 59 y o  male  HPI  Recent labs A1C 10 3 LDL 37   I started him on atorvastatin in March  Diet has been poor  Mother  in April  Reporting bilateral knee pain and recently,  left neck pain    The following portions of the patient's history were reviewed and updated as appropriate: allergies, current medications, past family history, past medical history, past social history, past surgical history and problem list     Review of Systems   Constitutional: Negative for fatigue, fever and unexpected weight change     HENT: Negative for ear pain, hearing loss, sinus pain, sinus pressure and sore throat  Dry mouth   Respiratory: Negative for cough, shortness of breath and wheezing  Cardiovascular: Negative for chest pain, palpitations and leg swelling  Gastrointestinal: Negative for abdominal pain, constipation, diarrhea, nausea and vomiting  Occ GERD   Endocrine: Positive for polydipsia  Negative for polyuria  Genitourinary:        Nocturia   Musculoskeletal: Positive for arthralgias (knees, mild and left neck pain)  Negative for myalgias  Skin:        Small red, not itchy spots on the right forearm for a few days   Neurological: Negative for dizziness and headaches  Psychiatric/Behavioral: Negative for dysphoric mood  Objective:      /84   Pulse 93   Ht 5' 8" (1 727 m)   Wt 99 7 kg (219 lb 12 8 oz)   SpO2 99%   BMI 33 42 kg/m²          Physical Exam   Constitutional: He is oriented to person, place, and time  He appears well-developed and well-nourished  HENT:   Head: Normocephalic and atraumatic  Right Ear: External ear normal    Left Ear: External ear normal    Mouth/Throat: Oropharynx is clear and moist    Eyes: Conjunctivae are normal    Neck: Neck supple  Cardiovascular: Normal rate, regular rhythm and normal heart sounds  Pulses are no weak pulses  No murmur heard  Pulses:       Dorsalis pedis pulses are 2+ on the right side, and 2+ on the left side  Pulmonary/Chest: Effort normal and breath sounds normal  No respiratory distress  He has no wheezes  He has no rales  Abdominal: Soft  Bowel sounds are normal  He exhibits no distension and no mass  There is no tenderness  There is no rebound and no guarding  Musculoskeletal: Normal range of motion  Feet:   Right Foot:   Skin Integrity: Negative for ulcer, skin breakdown, erythema, warmth, callus or dry skin  Left Foot:   Skin Integrity: Negative for ulcer, skin breakdown, erythema, warmth, callus or dry skin     Neurological: He is alert and oriented to person, place, and time  Skin: Skin is warm and dry  Pinkish hives on the right forearm, a few scabbed over   Psychiatric: He has a normal mood and affect  His behavior is normal  Judgment and thought content normal      Patient's shoes and socks removed  Right Foot/Ankle   Right Foot Inspection  Skin Exam: skin normal and skin intact no dry skin, no warmth, no callus, no erythema, no maceration, no abnormal color, no pre-ulcer, no ulcer and no callus                          Toe Exam: ROM and strength within normal limits  Sensory   Vibration: intact    Monofilament testing: intact  Vascular    The right DP pulse is 2+  Left Foot/Ankle  Left Foot Inspection  Skin Exam: skin normal and skin intactno dry skin, no warmth, no erythema, no maceration, normal color, no pre-ulcer, no ulcer and no callus                         Toe Exam: ROM and strength within normal limits                   Sensory       Monofilament: intact  Vascular    The left DP pulse is 2+  Assign Risk Category:  No deformity present; No loss of protective sensation;  No weak pulses       Risk: 0

## 2019-01-11 DIAGNOSIS — E11.9 CONTROLLED TYPE 2 DIABETES MELLITUS WITHOUT COMPLICATION, WITHOUT LONG-TERM CURRENT USE OF INSULIN (HCC): ICD-10-CM

## 2019-03-28 DIAGNOSIS — I10 HYPERTENSION, ESSENTIAL, BENIGN: ICD-10-CM

## 2019-03-28 RX ORDER — METOPROLOL TARTRATE 50 MG/1
TABLET, FILM COATED ORAL
Qty: 90 TABLET | Refills: 5 | Status: SHIPPED | OUTPATIENT
Start: 2019-03-28 | End: 2019-04-10 | Stop reason: SDUPTHER

## 2019-04-08 ENCOUNTER — APPOINTMENT (OUTPATIENT)
Dept: LAB | Facility: CLINIC | Age: 64
End: 2019-04-08
Payer: COMMERCIAL

## 2019-04-08 DIAGNOSIS — E78.5 DYSLIPIDEMIA: ICD-10-CM

## 2019-04-08 DIAGNOSIS — IMO0001 UNCONTROLLED DIABETES MELLITUS TYPE 2 WITHOUT COMPLICATIONS: ICD-10-CM

## 2019-04-08 DIAGNOSIS — I10 BENIGN ESSENTIAL HYPERTENSION: ICD-10-CM

## 2019-04-08 LAB
ALBUMIN SERPL BCP-MCNC: 3.6 G/DL (ref 3.5–5)
ALP SERPL-CCNC: 169 U/L (ref 46–116)
ALT SERPL W P-5'-P-CCNC: 54 U/L (ref 12–78)
ANION GAP SERPL CALCULATED.3IONS-SCNC: 3 MMOL/L (ref 4–13)
AST SERPL W P-5'-P-CCNC: 17 U/L (ref 5–45)
BASOPHILS # BLD AUTO: 0.03 THOUSANDS/ΜL (ref 0–0.1)
BASOPHILS NFR BLD AUTO: 1 % (ref 0–1)
BILIRUB SERPL-MCNC: 0.38 MG/DL (ref 0.2–1)
BUN SERPL-MCNC: 21 MG/DL (ref 5–25)
CALCIUM SERPL-MCNC: 8.7 MG/DL (ref 8.3–10.1)
CHLORIDE SERPL-SCNC: 107 MMOL/L (ref 100–108)
CHOLEST SERPL-MCNC: 81 MG/DL (ref 50–200)
CO2 SERPL-SCNC: 27 MMOL/L (ref 21–32)
CREAT SERPL-MCNC: 1.23 MG/DL (ref 0.6–1.3)
CREAT UR-MCNC: 224 MG/DL
EOSINOPHIL # BLD AUTO: 0.1 THOUSAND/ΜL (ref 0–0.61)
EOSINOPHIL NFR BLD AUTO: 2 % (ref 0–6)
ERYTHROCYTE [DISTWIDTH] IN BLOOD BY AUTOMATED COUNT: 13.2 % (ref 11.6–15.1)
EST. AVERAGE GLUCOSE BLD GHB EST-MCNC: 200 MG/DL
GFR SERPL CREATININE-BSD FRML MDRD: 63 ML/MIN/1.73SQ M
GLUCOSE P FAST SERPL-MCNC: 229 MG/DL (ref 65–99)
HBA1C MFR BLD: 8.6 % (ref 4.2–6.3)
HCT VFR BLD AUTO: 43.6 % (ref 36.5–49.3)
HDLC SERPL-MCNC: 27 MG/DL (ref 40–60)
HGB BLD-MCNC: 14 G/DL (ref 12–17)
IMM GRANULOCYTES # BLD AUTO: 0.01 THOUSAND/UL (ref 0–0.2)
IMM GRANULOCYTES NFR BLD AUTO: 0 % (ref 0–2)
LDLC SERPL CALC-MCNC: 34 MG/DL (ref 0–100)
LYMPHOCYTES # BLD AUTO: 2 THOUSANDS/ΜL (ref 0.6–4.47)
LYMPHOCYTES NFR BLD AUTO: 33 % (ref 14–44)
MCH RBC QN AUTO: 32 PG (ref 26.8–34.3)
MCHC RBC AUTO-ENTMCNC: 32.1 G/DL (ref 31.4–37.4)
MCV RBC AUTO: 100 FL (ref 82–98)
MICROALBUMIN UR-MCNC: 31.1 MG/L (ref 0–20)
MICROALBUMIN/CREAT 24H UR: 14 MG/G CREATININE (ref 0–30)
MONOCYTES # BLD AUTO: 0.45 THOUSAND/ΜL (ref 0.17–1.22)
MONOCYTES NFR BLD AUTO: 7 % (ref 4–12)
NEUTROPHILS # BLD AUTO: 3.46 THOUSANDS/ΜL (ref 1.85–7.62)
NEUTS SEG NFR BLD AUTO: 57 % (ref 43–75)
NONHDLC SERPL-MCNC: 54 MG/DL
NRBC BLD AUTO-RTO: 0 /100 WBCS
PLATELET # BLD AUTO: 119 THOUSANDS/UL (ref 149–390)
PMV BLD AUTO: 10.5 FL (ref 8.9–12.7)
POTASSIUM SERPL-SCNC: 4.5 MMOL/L (ref 3.5–5.3)
PROT SERPL-MCNC: 7.5 G/DL (ref 6.4–8.2)
RBC # BLD AUTO: 4.38 MILLION/UL (ref 3.88–5.62)
SODIUM SERPL-SCNC: 137 MMOL/L (ref 136–145)
TRIGL SERPL-MCNC: 102 MG/DL
TSH SERPL DL<=0.05 MIU/L-ACNC: 1.46 UIU/ML (ref 0.36–3.74)
WBC # BLD AUTO: 6.05 THOUSAND/UL (ref 4.31–10.16)

## 2019-04-08 PROCEDURE — 36415 COLL VENOUS BLD VENIPUNCTURE: CPT

## 2019-04-08 PROCEDURE — 80061 LIPID PANEL: CPT

## 2019-04-08 PROCEDURE — 82570 ASSAY OF URINE CREATININE: CPT

## 2019-04-08 PROCEDURE — 83036 HEMOGLOBIN GLYCOSYLATED A1C: CPT

## 2019-04-08 PROCEDURE — 80053 COMPREHEN METABOLIC PANEL: CPT

## 2019-04-08 PROCEDURE — 3061F NEG MICROALBUMINURIA REV: CPT | Performed by: INTERNAL MEDICINE

## 2019-04-08 PROCEDURE — 84443 ASSAY THYROID STIM HORMONE: CPT

## 2019-04-08 PROCEDURE — 85025 COMPLETE CBC W/AUTO DIFF WBC: CPT

## 2019-04-08 PROCEDURE — 82043 UR ALBUMIN QUANTITATIVE: CPT

## 2019-04-09 DIAGNOSIS — E78.5 DYSLIPIDEMIA: ICD-10-CM

## 2019-04-09 DIAGNOSIS — E11.9 CONTROLLED TYPE 2 DIABETES MELLITUS WITHOUT COMPLICATION, WITHOUT LONG-TERM CURRENT USE OF INSULIN (HCC): ICD-10-CM

## 2019-04-09 RX ORDER — ATORVASTATIN CALCIUM 20 MG/1
20 TABLET, FILM COATED ORAL DAILY
Qty: 90 TABLET | Refills: 1 | Status: SHIPPED | OUTPATIENT
Start: 2019-04-09 | End: 2019-10-12 | Stop reason: SDUPTHER

## 2019-04-10 ENCOUNTER — OFFICE VISIT (OUTPATIENT)
Dept: INTERNAL MEDICINE CLINIC | Facility: CLINIC | Age: 64
End: 2019-04-10
Payer: COMMERCIAL

## 2019-04-10 VITALS
HEART RATE: 83 BPM | WEIGHT: 219.2 LBS | BODY MASS INDEX: 33.22 KG/M2 | OXYGEN SATURATION: 98 % | DIASTOLIC BLOOD PRESSURE: 82 MMHG | SYSTOLIC BLOOD PRESSURE: 164 MMHG | HEIGHT: 68 IN

## 2019-04-10 DIAGNOSIS — F17.200 CURRENT SMOKER: ICD-10-CM

## 2019-04-10 DIAGNOSIS — I10 HYPERTENSION, ESSENTIAL, BENIGN: ICD-10-CM

## 2019-04-10 DIAGNOSIS — F41.1 GENERALIZED ANXIETY DISORDER: ICD-10-CM

## 2019-04-10 DIAGNOSIS — Z12.11 SCREEN FOR COLON CANCER: ICD-10-CM

## 2019-04-10 DIAGNOSIS — E78.5 DYSLIPIDEMIA: ICD-10-CM

## 2019-04-10 DIAGNOSIS — Z12.5 SCREENING PSA (PROSTATE SPECIFIC ANTIGEN): ICD-10-CM

## 2019-04-10 DIAGNOSIS — Z23 NEED FOR TDAP VACCINATION: ICD-10-CM

## 2019-04-10 DIAGNOSIS — E66.9 OBESITY WITHOUT SERIOUS COMORBIDITY, UNSPECIFIED CLASSIFICATION, UNSPECIFIED OBESITY TYPE: ICD-10-CM

## 2019-04-10 DIAGNOSIS — I10 BENIGN ESSENTIAL HYPERTENSION: ICD-10-CM

## 2019-04-10 DIAGNOSIS — Z11.59 NEED FOR HEPATITIS C SCREENING TEST: ICD-10-CM

## 2019-04-10 DIAGNOSIS — IMO0001 UNCONTROLLED DIABETES MELLITUS TYPE 2 WITHOUT COMPLICATIONS: Primary | ICD-10-CM

## 2019-04-10 PROCEDURE — 90471 IMMUNIZATION ADMIN: CPT

## 2019-04-10 PROCEDURE — 3008F BODY MASS INDEX DOCD: CPT | Performed by: INTERNAL MEDICINE

## 2019-04-10 PROCEDURE — 99214 OFFICE O/P EST MOD 30 MIN: CPT | Performed by: INTERNAL MEDICINE

## 2019-04-10 PROCEDURE — 90715 TDAP VACCINE 7 YRS/> IM: CPT

## 2019-04-10 RX ORDER — METOPROLOL TARTRATE 100 MG/1
100 TABLET ORAL 2 TIMES DAILY
Qty: 60 TABLET | Refills: 5 | Status: SHIPPED | OUTPATIENT
Start: 2019-04-10 | End: 2019-10-12 | Stop reason: SDUPTHER

## 2019-04-29 DIAGNOSIS — F41.9 ANXIETY: ICD-10-CM

## 2019-04-29 RX ORDER — ESCITALOPRAM OXALATE 20 MG/1
TABLET ORAL
Qty: 90 TABLET | Refills: 1 | Status: SHIPPED | OUTPATIENT
Start: 2019-04-29 | End: 2019-11-08 | Stop reason: SDUPTHER

## 2019-05-23 DIAGNOSIS — E11.9 CONTROLLED TYPE 2 DIABETES MELLITUS WITHOUT COMPLICATION, WITHOUT LONG-TERM CURRENT USE OF INSULIN (HCC): ICD-10-CM

## 2019-10-12 DIAGNOSIS — E78.5 DYSLIPIDEMIA: ICD-10-CM

## 2019-10-12 DIAGNOSIS — E11.9 CONTROLLED TYPE 2 DIABETES MELLITUS WITHOUT COMPLICATION, WITHOUT LONG-TERM CURRENT USE OF INSULIN (HCC): ICD-10-CM

## 2019-10-12 DIAGNOSIS — I10 HYPERTENSION, ESSENTIAL, BENIGN: ICD-10-CM

## 2019-10-12 RX ORDER — ATORVASTATIN CALCIUM 20 MG/1
20 TABLET, FILM COATED ORAL DAILY
Qty: 30 TABLET | Refills: 5 | Status: SHIPPED | OUTPATIENT
Start: 2019-10-12 | End: 2020-05-11

## 2019-10-12 RX ORDER — METOPROLOL TARTRATE 100 MG/1
100 TABLET ORAL 2 TIMES DAILY
Qty: 60 TABLET | Refills: 5 | Status: SHIPPED | OUTPATIENT
Start: 2019-10-12 | End: 2020-07-02

## 2019-11-08 DIAGNOSIS — F41.9 ANXIETY: ICD-10-CM

## 2019-11-08 RX ORDER — ESCITALOPRAM OXALATE 20 MG/1
TABLET ORAL
Qty: 30 TABLET | Refills: 5 | Status: SHIPPED | OUTPATIENT
Start: 2019-11-08 | End: 2020-06-09

## 2019-12-19 DIAGNOSIS — E11.9 CONTROLLED TYPE 2 DIABETES MELLITUS WITHOUT COMPLICATION, WITHOUT LONG-TERM CURRENT USE OF INSULIN (HCC): ICD-10-CM

## 2020-05-11 DIAGNOSIS — E78.5 DYSLIPIDEMIA: ICD-10-CM

## 2020-05-11 DIAGNOSIS — E11.9 CONTROLLED TYPE 2 DIABETES MELLITUS WITHOUT COMPLICATION, WITHOUT LONG-TERM CURRENT USE OF INSULIN (HCC): ICD-10-CM

## 2020-05-11 RX ORDER — ATORVASTATIN CALCIUM 20 MG/1
20 TABLET, FILM COATED ORAL DAILY
Qty: 30 TABLET | Refills: 2 | Status: SHIPPED | OUTPATIENT
Start: 2020-05-11 | End: 2020-07-23

## 2020-05-15 ENCOUNTER — TELEPHONE (OUTPATIENT)
Dept: INTERNAL MEDICINE CLINIC | Facility: CLINIC | Age: 65
End: 2020-05-15

## 2020-05-18 ENCOUNTER — OFFICE VISIT (OUTPATIENT)
Dept: INTERNAL MEDICINE CLINIC | Facility: CLINIC | Age: 65
End: 2020-05-18
Payer: COMMERCIAL

## 2020-05-18 VITALS
HEART RATE: 84 BPM | OXYGEN SATURATION: 97 % | SYSTOLIC BLOOD PRESSURE: 180 MMHG | WEIGHT: 185 LBS | DIASTOLIC BLOOD PRESSURE: 92 MMHG | TEMPERATURE: 98.9 F | BODY MASS INDEX: 28.13 KG/M2

## 2020-05-18 DIAGNOSIS — F17.200 CURRENT SMOKER: ICD-10-CM

## 2020-05-18 DIAGNOSIS — I10 BENIGN ESSENTIAL HYPERTENSION: ICD-10-CM

## 2020-05-18 DIAGNOSIS — R21 RASH: ICD-10-CM

## 2020-05-18 DIAGNOSIS — L03.113 CELLULITIS OF RIGHT ARM: ICD-10-CM

## 2020-05-18 DIAGNOSIS — Z12.11 SCREENING FOR COLON CANCER: ICD-10-CM

## 2020-05-18 DIAGNOSIS — F41.1 GENERALIZED ANXIETY DISORDER: ICD-10-CM

## 2020-05-18 DIAGNOSIS — Z11.59 NEED FOR HEPATITIS C SCREENING TEST: ICD-10-CM

## 2020-05-18 DIAGNOSIS — E66.9 OBESITY WITH SERIOUS COMORBIDITY, UNSPECIFIED CLASSIFICATION, UNSPECIFIED OBESITY TYPE: ICD-10-CM

## 2020-05-18 DIAGNOSIS — E78.5 DYSLIPIDEMIA: ICD-10-CM

## 2020-05-18 DIAGNOSIS — E08.65 DIABETES MELLITUS DUE TO UNDERLYING CONDITION, UNCONTROLLED, WITH HYPERGLYCEMIA (HCC): Primary | ICD-10-CM

## 2020-05-18 PROCEDURE — 99214 OFFICE O/P EST MOD 30 MIN: CPT | Performed by: INTERNAL MEDICINE

## 2020-05-18 RX ORDER — DOXYCYCLINE 100 MG/1
100 TABLET ORAL 2 TIMES DAILY
Qty: 20 TABLET | Refills: 0 | Status: SHIPPED | OUTPATIENT
Start: 2020-05-18 | End: 2020-05-26 | Stop reason: SDUPTHER

## 2020-05-18 RX ORDER — LANCETS
EACH MISCELLANEOUS DAILY
Qty: 100 EACH | Refills: 1 | Status: SHIPPED | OUTPATIENT
Start: 2020-05-18 | End: 2020-05-22 | Stop reason: SDUPTHER

## 2020-05-21 ENCOUNTER — APPOINTMENT (OUTPATIENT)
Dept: LAB | Age: 65
End: 2020-05-21
Payer: COMMERCIAL

## 2020-05-21 DIAGNOSIS — E08.65 DIABETES MELLITUS DUE TO UNDERLYING CONDITION, UNCONTROLLED, WITH HYPERGLYCEMIA (HCC): Primary | ICD-10-CM

## 2020-05-21 LAB
ALBUMIN SERPL BCP-MCNC: 4.1 G/DL (ref 3.5–5)
ALP SERPL-CCNC: 167 U/L (ref 46–116)
ALT SERPL W P-5'-P-CCNC: 26 U/L (ref 12–78)
ANION GAP SERPL CALCULATED.3IONS-SCNC: 6 MMOL/L (ref 4–13)
AST SERPL W P-5'-P-CCNC: 14 U/L (ref 5–45)
BASOPHILS # BLD AUTO: 0.07 THOUSANDS/ΜL (ref 0–0.1)
BASOPHILS NFR BLD AUTO: 1 % (ref 0–1)
BILIRUB SERPL-MCNC: 0.89 MG/DL (ref 0.2–1)
BUN SERPL-MCNC: 21 MG/DL (ref 5–25)
CALCIUM SERPL-MCNC: 9.4 MG/DL (ref 8.3–10.1)
CHLORIDE SERPL-SCNC: 100 MMOL/L (ref 100–108)
CHOLEST SERPL-MCNC: 106 MG/DL (ref 50–200)
CO2 SERPL-SCNC: 24 MMOL/L (ref 21–32)
CREAT SERPL-MCNC: 1.04 MG/DL (ref 0.6–1.3)
CREAT UR-MCNC: 83.3 MG/DL
EOSINOPHIL # BLD AUTO: 0.1 THOUSAND/ΜL (ref 0–0.61)
EOSINOPHIL NFR BLD AUTO: 1 % (ref 0–6)
ERYTHROCYTE [DISTWIDTH] IN BLOOD BY AUTOMATED COUNT: 13.2 % (ref 11.6–15.1)
EST. AVERAGE GLUCOSE BLD GHB EST-MCNC: >355 MG/DL
GFR SERPL CREATININE-BSD FRML MDRD: 76 ML/MIN/1.73SQ M
GLUCOSE P FAST SERPL-MCNC: 357 MG/DL (ref 65–99)
HBA1C MFR BLD: >14 %
HCT VFR BLD AUTO: 45.8 % (ref 36.5–49.3)
HCV AB SER QL: NORMAL
HDLC SERPL-MCNC: 27 MG/DL
HGB BLD-MCNC: 15.2 G/DL (ref 12–17)
IMM GRANULOCYTES # BLD AUTO: 0.03 THOUSAND/UL (ref 0–0.2)
IMM GRANULOCYTES NFR BLD AUTO: 0 % (ref 0–2)
LDLC SERPL CALC-MCNC: 39 MG/DL (ref 0–100)
LYMPHOCYTES # BLD AUTO: 2.87 THOUSANDS/ΜL (ref 0.6–4.47)
LYMPHOCYTES NFR BLD AUTO: 40 % (ref 14–44)
MCH RBC QN AUTO: 32 PG (ref 26.8–34.3)
MCHC RBC AUTO-ENTMCNC: 33.2 G/DL (ref 31.4–37.4)
MCV RBC AUTO: 96 FL (ref 82–98)
MICROALBUMIN UR-MCNC: 60.7 MG/L (ref 0–20)
MICROALBUMIN/CREAT 24H UR: 73 MG/G CREATININE (ref 0–30)
MONOCYTES # BLD AUTO: 0.48 THOUSAND/ΜL (ref 0.17–1.22)
MONOCYTES NFR BLD AUTO: 7 % (ref 4–12)
NEUTROPHILS # BLD AUTO: 3.67 THOUSANDS/ΜL (ref 1.85–7.62)
NEUTS SEG NFR BLD AUTO: 51 % (ref 43–75)
NONHDLC SERPL-MCNC: 79 MG/DL
NRBC BLD AUTO-RTO: 0 /100 WBCS
PLATELET # BLD AUTO: 163 THOUSANDS/UL (ref 149–390)
PMV BLD AUTO: 10.1 FL (ref 8.9–12.7)
POTASSIUM SERPL-SCNC: 4.3 MMOL/L (ref 3.5–5.3)
PROT SERPL-MCNC: 8 G/DL (ref 6.4–8.2)
RBC # BLD AUTO: 4.75 MILLION/UL (ref 3.88–5.62)
SODIUM SERPL-SCNC: 130 MMOL/L (ref 136–145)
TRIGL SERPL-MCNC: 198 MG/DL
WBC # BLD AUTO: 7.22 THOUSAND/UL (ref 4.31–10.16)

## 2020-05-21 PROCEDURE — 80053 COMPREHEN METABOLIC PANEL: CPT | Performed by: INTERNAL MEDICINE

## 2020-05-21 PROCEDURE — 85025 COMPLETE CBC W/AUTO DIFF WBC: CPT | Performed by: INTERNAL MEDICINE

## 2020-05-21 PROCEDURE — 3060F POS MICROALBUMINURIA REV: CPT | Performed by: INTERNAL MEDICINE

## 2020-05-21 PROCEDURE — 86803 HEPATITIS C AB TEST: CPT | Performed by: INTERNAL MEDICINE

## 2020-05-21 PROCEDURE — 80061 LIPID PANEL: CPT | Performed by: INTERNAL MEDICINE

## 2020-05-21 PROCEDURE — 36415 COLL VENOUS BLD VENIPUNCTURE: CPT | Performed by: INTERNAL MEDICINE

## 2020-05-21 PROCEDURE — 82570 ASSAY OF URINE CREATININE: CPT | Performed by: INTERNAL MEDICINE

## 2020-05-21 PROCEDURE — 83036 HEMOGLOBIN GLYCOSYLATED A1C: CPT | Performed by: INTERNAL MEDICINE

## 2020-05-21 PROCEDURE — 82043 UR ALBUMIN QUANTITATIVE: CPT | Performed by: INTERNAL MEDICINE

## 2020-05-22 ENCOUNTER — HOSPITAL ENCOUNTER (EMERGENCY)
Facility: HOSPITAL | Age: 65
Discharge: HOME | End: 2020-05-22
Attending: EMERGENCY MEDICINE | Admitting: EMERGENCY MEDICINE
Payer: COMMERCIAL

## 2020-05-22 VITALS
SYSTOLIC BLOOD PRESSURE: 152 MMHG | TEMPERATURE: 98.6 F | RESPIRATION RATE: 20 BRPM | DIASTOLIC BLOOD PRESSURE: 79 MMHG | HEART RATE: 80 BPM | OXYGEN SATURATION: 98 %

## 2020-05-22 DIAGNOSIS — E11.65 TYPE 2 DIABETES MELLITUS WITH HYPERGLYCEMIA, WITHOUT LONG-TERM CURRENT USE OF INSULIN (HCC): Primary | ICD-10-CM

## 2020-05-22 DIAGNOSIS — L03.90 CELLULITIS: ICD-10-CM

## 2020-05-22 DIAGNOSIS — E08.65 DIABETES MELLITUS DUE TO UNDERLYING CONDITION, UNCONTROLLED, WITH HYPERGLYCEMIA (HCC): ICD-10-CM

## 2020-05-22 DIAGNOSIS — I10 HIGH BLOOD PRESSURE: ICD-10-CM

## 2020-05-22 LAB
ANION GAP SERPL CALCULATED.3IONS-SCNC: 10 MMOL/L (ref 4–13)
ATRIAL RATE: 69 BPM
BASOPHILS # BLD AUTO: 0.03 THOUSANDS/ΜL (ref 0–0.1)
BASOPHILS NFR BLD AUTO: 1 % (ref 0–1)
BILIRUB UR QL STRIP: NEGATIVE
BUN SERPL-MCNC: 23 MG/DL (ref 5–25)
CALCIUM SERPL-MCNC: 9.2 MG/DL (ref 8.3–10.1)
CHLORIDE SERPL-SCNC: 99 MMOL/L (ref 100–108)
CLARITY UR: CLEAR
CO2 SERPL-SCNC: 20 MMOL/L (ref 21–32)
COLOR UR: YELLOW
CREAT SERPL-MCNC: 1.1 MG/DL (ref 0.6–1.3)
EOSINOPHIL # BLD AUTO: 0.06 THOUSAND/ΜL (ref 0–0.61)
EOSINOPHIL NFR BLD AUTO: 1 % (ref 0–6)
ERYTHROCYTE [DISTWIDTH] IN BLOOD BY AUTOMATED COUNT: 13.1 % (ref 11.6–15.1)
GFR SERPL CREATININE-BSD FRML MDRD: 71 ML/MIN/1.73SQ M
GLUCOSE SERPL-MCNC: 462 MG/DL (ref 65–140)
GLUCOSE SERPL-MCNC: 494 MG/DL (ref 65–140)
GLUCOSE UR STRIP-MCNC: ABNORMAL MG/DL
HCT VFR BLD AUTO: 43 % (ref 36.5–49.3)
HGB BLD-MCNC: 14.7 G/DL (ref 12–17)
HGB UR QL STRIP.AUTO: NEGATIVE
IMM GRANULOCYTES # BLD AUTO: 0.04 THOUSAND/UL (ref 0–0.2)
IMM GRANULOCYTES NFR BLD AUTO: 1 % (ref 0–2)
KETONES UR STRIP-MCNC: NEGATIVE MG/DL
LEUKOCYTE ESTERASE UR QL STRIP: NEGATIVE
LYMPHOCYTES # BLD AUTO: 1.83 THOUSANDS/ΜL (ref 0.6–4.47)
LYMPHOCYTES NFR BLD AUTO: 30 % (ref 14–44)
MCH RBC QN AUTO: 32.2 PG (ref 26.8–34.3)
MCHC RBC AUTO-ENTMCNC: 34.2 G/DL (ref 31.4–37.4)
MCV RBC AUTO: 94 FL (ref 82–98)
MONOCYTES # BLD AUTO: 0.46 THOUSAND/ΜL (ref 0.17–1.22)
MONOCYTES NFR BLD AUTO: 8 % (ref 4–12)
NEUTROPHILS # BLD AUTO: 3.61 THOUSANDS/ΜL (ref 1.85–7.62)
NEUTS SEG NFR BLD AUTO: 59 % (ref 43–75)
NITRITE UR QL STRIP: NEGATIVE
NRBC BLD AUTO-RTO: 0 /100 WBCS
P AXIS: 83 DEGREES
PH UR STRIP.AUTO: 5.5 [PH] (ref 4.5–8)
PLATELET # BLD AUTO: 155 THOUSANDS/UL (ref 149–390)
PMV BLD AUTO: 10.3 FL (ref 8.9–12.7)
POTASSIUM SERPL-SCNC: 4.1 MMOL/L (ref 3.5–5.3)
PR INTERVAL: 166 MS
PROT UR STRIP-MCNC: NEGATIVE MG/DL
QRS AXIS: 83 DEGREES
QRSD INTERVAL: 86 MS
QT INTERVAL: 384 MS
QTC INTERVAL: 411 MS
RBC # BLD AUTO: 4.57 MILLION/UL (ref 3.88–5.62)
SODIUM SERPL-SCNC: 129 MMOL/L (ref 136–145)
SP GR UR STRIP.AUTO: 1.01 (ref 1–1.03)
T WAVE AXIS: 69 DEGREES
TROPONIN I SERPL-MCNC: <0.02 NG/ML
UROBILINOGEN UR QL STRIP.AUTO: 0.2 E.U./DL
VENTRICULAR RATE: 69 BPM
WBC # BLD AUTO: 6.03 THOUSAND/UL (ref 4.31–10.16)

## 2020-05-22 PROCEDURE — 96360 HYDRATION IV INFUSION INIT: CPT

## 2020-05-22 PROCEDURE — 82948 REAGENT STRIP/BLOOD GLUCOSE: CPT

## 2020-05-22 PROCEDURE — 81003 URINALYSIS AUTO W/O SCOPE: CPT

## 2020-05-22 PROCEDURE — 80048 BASIC METABOLIC PNL TOTAL CA: CPT | Performed by: EMERGENCY MEDICINE

## 2020-05-22 PROCEDURE — 84484 ASSAY OF TROPONIN QUANT: CPT | Performed by: EMERGENCY MEDICINE

## 2020-05-22 PROCEDURE — 93005 ELECTROCARDIOGRAM TRACING: CPT

## 2020-05-22 PROCEDURE — 36415 COLL VENOUS BLD VENIPUNCTURE: CPT | Performed by: EMERGENCY MEDICINE

## 2020-05-22 PROCEDURE — 93010 ELECTROCARDIOGRAM REPORT: CPT | Performed by: INTERNAL MEDICINE

## 2020-05-22 PROCEDURE — 85025 COMPLETE CBC W/AUTO DIFF WBC: CPT | Performed by: EMERGENCY MEDICINE

## 2020-05-22 PROCEDURE — 99284 EMERGENCY DEPT VISIT MOD MDM: CPT | Performed by: EMERGENCY MEDICINE

## 2020-05-22 PROCEDURE — 99283 EMERGENCY DEPT VISIT LOW MDM: CPT

## 2020-05-22 RX ORDER — BLOOD-GLUCOSE METER
KIT MISCELLANEOUS DAILY
Qty: 1 EACH | Refills: 0 | Status: SHIPPED | OUTPATIENT
Start: 2020-05-22 | End: 2020-06-01 | Stop reason: ALTCHOICE

## 2020-05-22 RX ORDER — LANCETS
EACH MISCELLANEOUS DAILY
Qty: 100 EACH | Refills: 0 | Status: SHIPPED | OUTPATIENT
Start: 2020-05-22 | End: 2020-08-05

## 2020-05-22 RX ADMIN — SODIUM CHLORIDE 1000 ML: 0.9 INJECTION, SOLUTION INTRAVENOUS at 13:00

## 2020-05-26 ENCOUNTER — OFFICE VISIT (OUTPATIENT)
Dept: INTERNAL MEDICINE CLINIC | Facility: CLINIC | Age: 65
End: 2020-05-26
Payer: COMMERCIAL

## 2020-05-26 VITALS
WEIGHT: 182 LBS | DIASTOLIC BLOOD PRESSURE: 82 MMHG | SYSTOLIC BLOOD PRESSURE: 130 MMHG | OXYGEN SATURATION: 99 % | HEART RATE: 81 BPM | TEMPERATURE: 96.9 F | BODY MASS INDEX: 27.67 KG/M2

## 2020-05-26 DIAGNOSIS — L03.113 CELLULITIS OF RIGHT ARM: ICD-10-CM

## 2020-05-26 DIAGNOSIS — I10 BENIGN ESSENTIAL HYPERTENSION: ICD-10-CM

## 2020-05-26 DIAGNOSIS — F17.200 CURRENT SMOKER: ICD-10-CM

## 2020-05-26 DIAGNOSIS — E08.65 DIABETES MELLITUS DUE TO UNDERLYING CONDITION, UNCONTROLLED, WITH HYPERGLYCEMIA (HCC): Primary | ICD-10-CM

## 2020-05-26 DIAGNOSIS — E78.5 DYSLIPIDEMIA: ICD-10-CM

## 2020-05-26 PROCEDURE — 99214 OFFICE O/P EST MOD 30 MIN: CPT | Performed by: INTERNAL MEDICINE

## 2020-05-26 RX ORDER — DOXYCYCLINE 100 MG/1
100 TABLET ORAL 2 TIMES DAILY
Qty: 20 TABLET | Refills: 0 | Status: SHIPPED | OUTPATIENT
Start: 2020-05-26 | End: 2020-06-05

## 2020-05-28 ENCOUNTER — DOCUMENTATION (OUTPATIENT)
Dept: INTERNAL MEDICINE CLINIC | Facility: CLINIC | Age: 65
End: 2020-05-28

## 2020-06-01 ENCOUNTER — CLINICAL SUPPORT (OUTPATIENT)
Dept: INTERNAL MEDICINE CLINIC | Facility: CLINIC | Age: 65
End: 2020-06-01

## 2020-06-01 DIAGNOSIS — E08.65 DIABETES MELLITUS DUE TO UNDERLYING CONDITION, UNCONTROLLED, WITH HYPERGLYCEMIA (HCC): ICD-10-CM

## 2020-06-04 ENCOUNTER — DOCUMENTATION (OUTPATIENT)
Dept: INTERNAL MEDICINE CLINIC | Facility: CLINIC | Age: 65
End: 2020-06-04

## 2020-06-08 ENCOUNTER — CLINICAL SUPPORT (OUTPATIENT)
Dept: INTERNAL MEDICINE CLINIC | Facility: CLINIC | Age: 65
End: 2020-06-08

## 2020-06-08 DIAGNOSIS — E08.65 DIABETES MELLITUS DUE TO UNDERLYING CONDITION, UNCONTROLLED, WITH HYPERGLYCEMIA (HCC): Primary | ICD-10-CM

## 2020-06-08 DIAGNOSIS — E08.65 DIABETES MELLITUS DUE TO UNDERLYING CONDITION, UNCONTROLLED, WITH HYPERGLYCEMIA (HCC): ICD-10-CM

## 2020-06-09 DIAGNOSIS — F41.9 ANXIETY: ICD-10-CM

## 2020-06-09 RX ORDER — ESCITALOPRAM OXALATE 20 MG/1
TABLET ORAL
Qty: 30 TABLET | Refills: 5 | Status: SHIPPED | OUTPATIENT
Start: 2020-06-09 | End: 2021-01-21

## 2020-06-11 DIAGNOSIS — L03.113 CELLULITIS OF RIGHT ARM: ICD-10-CM

## 2020-06-11 RX ORDER — DOXYCYCLINE 100 MG/1
TABLET ORAL
Qty: 20 TABLET | Refills: 0 | OUTPATIENT
Start: 2020-06-11

## 2020-06-14 DIAGNOSIS — L03.113 CELLULITIS OF RIGHT ARM: ICD-10-CM

## 2020-06-15 ENCOUNTER — CLINICAL SUPPORT (OUTPATIENT)
Dept: INTERNAL MEDICINE CLINIC | Facility: CLINIC | Age: 65
End: 2020-06-15

## 2020-06-15 DIAGNOSIS — E08.65 DIABETES MELLITUS DUE TO UNDERLYING CONDITION, UNCONTROLLED, WITH HYPERGLYCEMIA (HCC): ICD-10-CM

## 2020-06-15 DIAGNOSIS — E08.65 DIABETES MELLITUS DUE TO UNDERLYING CONDITION, UNCONTROLLED, WITH HYPERGLYCEMIA (HCC): Primary | ICD-10-CM

## 2020-06-16 RX ORDER — DOXYCYCLINE 100 MG/1
TABLET ORAL
Qty: 20 TABLET | Refills: 0 | OUTPATIENT
Start: 2020-06-16

## 2020-06-16 NOTE — TELEPHONE ENCOUNTER
Please call him that I do not think he needs another round of the antibiotic  Observe the arm at this time  If it somehow worsens without the doxycycline, he should call

## 2020-06-18 LAB
LEFT EYE DIABETIC RETINOPATHY: NORMAL
RIGHT EYE DIABETIC RETINOPATHY: NORMAL

## 2020-06-22 ENCOUNTER — CLINICAL SUPPORT (OUTPATIENT)
Dept: INTERNAL MEDICINE CLINIC | Facility: CLINIC | Age: 65
End: 2020-06-22

## 2020-06-22 DIAGNOSIS — E08.65 DIABETES MELLITUS DUE TO UNDERLYING CONDITION, UNCONTROLLED, WITH HYPERGLYCEMIA (HCC): Primary | ICD-10-CM

## 2020-06-22 DIAGNOSIS — E08.65 DIABETES MELLITUS DUE TO UNDERLYING CONDITION, UNCONTROLLED, WITH HYPERGLYCEMIA (HCC): ICD-10-CM

## 2020-06-24 ENCOUNTER — TELEPHONE (OUTPATIENT)
Dept: INTERNAL MEDICINE CLINIC | Facility: CLINIC | Age: 65
End: 2020-06-24

## 2020-06-26 ENCOUNTER — OFFICE VISIT (OUTPATIENT)
Dept: INTERNAL MEDICINE CLINIC | Facility: CLINIC | Age: 65
End: 2020-06-26
Payer: COMMERCIAL

## 2020-06-26 VITALS
SYSTOLIC BLOOD PRESSURE: 130 MMHG | HEART RATE: 84 BPM | TEMPERATURE: 98.8 F | BODY MASS INDEX: 30.26 KG/M2 | WEIGHT: 199 LBS | DIASTOLIC BLOOD PRESSURE: 74 MMHG | OXYGEN SATURATION: 98 %

## 2020-06-26 DIAGNOSIS — E08.65 DIABETES MELLITUS DUE TO UNDERLYING CONDITION, UNCONTROLLED, WITH HYPERGLYCEMIA (HCC): Primary | ICD-10-CM

## 2020-06-26 DIAGNOSIS — I10 BENIGN ESSENTIAL HYPERTENSION: ICD-10-CM

## 2020-06-26 DIAGNOSIS — E78.5 DYSLIPIDEMIA: ICD-10-CM

## 2020-06-26 DIAGNOSIS — R21 RASH AND NONSPECIFIC SKIN ERUPTION: ICD-10-CM

## 2020-06-26 PROCEDURE — 3078F DIAST BP <80 MM HG: CPT | Performed by: INTERNAL MEDICINE

## 2020-06-26 PROCEDURE — 3075F SYST BP GE 130 - 139MM HG: CPT | Performed by: INTERNAL MEDICINE

## 2020-06-26 PROCEDURE — 4004F PT TOBACCO SCREEN RCVD TLK: CPT | Performed by: INTERNAL MEDICINE

## 2020-06-26 PROCEDURE — 2022F DILAT RTA XM EVC RTNOPTHY: CPT | Performed by: INTERNAL MEDICINE

## 2020-06-26 PROCEDURE — 99214 OFFICE O/P EST MOD 30 MIN: CPT | Performed by: INTERNAL MEDICINE

## 2020-07-02 DIAGNOSIS — I10 HYPERTENSION, ESSENTIAL, BENIGN: ICD-10-CM

## 2020-07-02 RX ORDER — METOPROLOL TARTRATE 100 MG/1
100 TABLET ORAL 2 TIMES DAILY
Qty: 60 TABLET | Refills: 5 | Status: SHIPPED | OUTPATIENT
Start: 2020-07-02 | End: 2021-02-07

## 2020-07-06 ENCOUNTER — CLINICAL SUPPORT (OUTPATIENT)
Dept: INTERNAL MEDICINE CLINIC | Facility: CLINIC | Age: 65
End: 2020-07-06

## 2020-07-06 DIAGNOSIS — E08.65 DIABETES MELLITUS DUE TO UNDERLYING CONDITION, UNCONTROLLED, WITH HYPERGLYCEMIA (HCC): ICD-10-CM

## 2020-07-06 DIAGNOSIS — E08.65 DIABETES MELLITUS DUE TO UNDERLYING CONDITION, UNCONTROLLED, WITH HYPERGLYCEMIA (HCC): Primary | ICD-10-CM

## 2020-07-06 NOTE — PROGRESS NOTES
Flora, PharmD, BCACP    Reason for visit: Appointment with 62y o  year old for management of T2DM monitoring efficacy and tolerability of anti-diabetic medications  This virtual check-in was done via phone  Encounter provider: Melissa Schultz Pharmacist    Patient agrees to participate in a virtual check in via telephone or video visit instead of presenting to the office to address urgent/immediate medical needs  After connecting through telephone, the patient was identified by name and date of birth  Gerard Jasmeet was informed that this was a telemedicine visit and that the exam was being conducted confidentially over secure lines  My office door was closed  No one else was in the room  Gerard Cone acknowledged consent and understanding of privacy and security of the telemedicine visit  I informed the patient that I have reviewed He record in Epic and presented the opportunity for He to ask any questions regarding the visit today  The patient agreed to participate  Current DM Regimen:  · Insulin glargine 33 units daily  · Metformin 2gm/day  · Empagliflozin 10mg daily    ASSESSMENT/PLAN                                                                                     1  Type 2 diabetes: goal A1c <7% based on 2020 ADA guidelines               Most recent A1c above goal but not reflective of current treatment as patient was not on insulin  Reported SMBG improved but remains above goal without hypoglycemia  May benefit from addition of GLP1       BMP shows hyperglycemia with possible dilutional hyponatremia and hypochloremia                 Duration: < 5 years (dx 2018)  Microvascular complications: microalbuminuria  Macrovascular complications: none  (Yes) Aspirin   (Yes) Statin     (No) ACEI/ARB  Eye Exam:  6/18/2020  Foot Exam: 5/18/2020     Most recent labs and diabetes goals discussed with patient in clinic today   MEDICATIONS: If PCP is in agreeance, will increase to insulin glargine 38 units daily and continue metformin and empagliflozin  Patient would prefer to hold off on starting additional rx    o Will pend rx for PCP signature/concurrence   SMBG: BID   TLCs: Re-enforced the importance of a Diabetic Diet, exercise 30 minutes 5 days a week as tolerated, weight loss/control   Discussion with patient on proper sharps disposal, voiced understanding  2  Medication Reconciliation: Medication list reviewed with pt at today's visit  Discrepancies as discussed below  Follow-Up: 2 weeks  _x_ Home Monitoring Records, BG    SUBJECTIVE                                                                                                              1  Medication Adherence: Medication list reviewed with patient, reports the following discrepancies/problems:   Insulin glargine: was able to use [employer] card successfully   Test strips: has picked up supply from pharmacy    Misses doses:  No    Inquires about proper needle/lancet disposal    2  Medication Efficacy:    Hypoglycemia history: 0 SMBG readings < 70mg/dL since last appt   Has glucose tablets: No   Knows how to treat: Yes   Unsymptomatic hypoglycemia: No, reports hypoglycemia s/sx x0 since last appt      3  Lifestyle: has dentures  Continues to cut out regular soda, rice and past since last appt  Has been drinking green tea, which contains 35gm of CHO in the whole bottle but has small portion  Had a hamburger once but caused HS BG to be elevated   Has been eating more snacks at night (yogurt, tomatos)     Breakfast: coffee, oats + 6-8 blueberries      Lunch: seafood salad      Dinner: vegetables, chicken - started to eat at 4PM since starting insulin     Snacks: popcorn, cheese, cherry tomatoes      Exercise: limited, no formal    Social History     Tobacco Use   Smoking Status Current Every Day Smoker    Packs/day: 0 50   Smokeless Tobacco Never Used     Social History     Substance and Sexual Activity   Alcohol Use No          OBJECTIVE                                                                                                      SMBG readings: has been checking BID as he did not have enough strips to check TID  FBG Average: 176 mg/dl (150-200 mg/dl), n=11, 0/11= < 70 mg/dl   Pre-Supper BG Average: 155 mg/dl (155-155 mg/dl), n=1, 0/1= < 70 mg/dl   HS BG Average: 211 mg/dl (117-299 mg/dl), n=10, 0/10= < 70 mg/dl       Pertinent Lab Data:    Lab Results   Component Value Date    SODIUM 129 (L) 05/22/2020    K 4 1 05/22/2020    CL 99 (L) 05/22/2020    CO2 20 (L) 05/22/2020    BUN 23 05/22/2020    CREATININE 1 10 05/22/2020    GLUC 494 (H) 05/22/2020    CALCIUM 9 2 05/22/2020       Lab Results   Component Value Date    HGBA1C >14 0 (H) 05/21/2020     Microalbumin/Creatinine: 73 (5/2020)    Demographics  Interaction Method: Phone  Type of Intervention: Follow-Up    Topic(s) Addressed  Diabetes    Intervention(s) Made    Pharmacologic:  Medication Initiation: GLP1    Medication Adjustment - Dose or Frequency: Lantus    Non-Pharmacologic:      Other: SMBG, Lifestyle, Sharps disposal    Tool(s) Used  Not Applicable    Time Spent:   Time Spent in Direct Patient Care: 30 minutes    Time Spent in Care Coordination: 10 minutes    Recommendation(s) Accepted by the Patient/Caregiver: Partially Accepted

## 2020-07-06 NOTE — TELEPHONE ENCOUNTER
Per pharmacist encounter on 07/06/20, pending orders for signature/concurrence from Lanette Daniels MD   [facility] Insulin glargine

## 2020-07-20 ENCOUNTER — CLINICAL SUPPORT (OUTPATIENT)
Dept: INTERNAL MEDICINE CLINIC | Facility: CLINIC | Age: 65
End: 2020-07-20

## 2020-07-20 DIAGNOSIS — E08.65 DIABETES MELLITUS DUE TO UNDERLYING CONDITION, UNCONTROLLED, WITH HYPERGLYCEMIA (HCC): Primary | ICD-10-CM

## 2020-07-20 NOTE — TELEPHONE ENCOUNTER
Per pharmacist encounter on 07/20/20, pending orders for signature/concurrence from Sharad Bosch MD    Empagliflozin/metformin XR

## 2020-07-20 NOTE — PROGRESS NOTES
Flora, PharmD, BCACP    Reason for visit: Appointment with 62y o  year old for management of T2DM monitoring efficacy and tolerability of anti-diabetic medications  This virtual check-in was done via phone  Encounter provider: Tamara Lazaro, Pharmacist    Patient agrees to participate in a virtual check in via telephone or video visit instead of presenting to the office to address urgent/immediate medical needs  After connecting through telephone, the patient was identified by name and date of birth  Alexandriaamaya Mosquera was informed that this was a telemedicine visit and that the exam was being conducted confidentially over secure lines  My office door was closed  No one else was in the room  Natalie Mosquera acknowledged consent and understanding of privacy and security of the telemedicine visit  I informed the patient that I have reviewed He record in Epic and presented the opportunity for He to ask any questions regarding the visit today  The patient agreed to participate  Current DM Regimen:  · Insulin glargine 38 units daily  · Metformin 2gm/day  · Empagliflozin 10mg daily    ASSESSMENT/PLAN                                                                                     1  Type 2 diabetes: goal A1c <7% based on 2020 ADA guidelines               Most recent A1c above goal but not reflective of current treatment as patient was not on insulin      Reported SMBG elevated with hypoglycemia d/t not eating      Would benefit from addition of GLP1 prior to starting mealtime insulin                 BMP shows hyperglycemia with possible dilutional hyponatremia and hypochloremia       Duration: < 5 years (dx 2018)  Microvascular complications: microalbuminuria  Macrovascular complications: none  (Yes) Aspirin   (Yes) Statin     (No) ACEI/ARB  Eye Exam:  6/18/2020  Foot Exam: 5/18/2020     Most recent labs and diabetes goals discussed with patient in clinic today   MEDICATIONS: Patient would prefer to hold off on starting new rx as he would like to try to reduce HS snacking  If PCP is in agreeance, will increase empagliflozin dose to 20mg/day to allow patient to use up current supply; when current supply is used up patient can start combination metformin XR/empagliflozin 2000/25mg daily  Continue insulin glargine  o Will pend rx for PCP signature/concurrence   SMBG: BID   TLCs: Re-enforced the importance of a Diabetic Diet, exercise 30 minutes 5 days a week as tolerated, weight loss/control  Encouraged patient to check if hypoglycemia, patient agrees not to skip meals  2  Medication Reconciliation: Medication list reviewed with pt at today's visit  No discrepancies  Follow-Up: 2 weeks via phone per patient preference  _x_ Home Monitoring Records, BG     SUBJECTIVE                                                                                                              1  Medication Adherence: Medication list reviewed with patient, reports the following discrepancies/problems:   Empagliflozin: recently picked up #30 - 10mg tablets from pharmacy    Misses doses:  no      2  Medication Efficacy:    Review of Systems   Gastrointestinal: Negative for diarrhea and nausea  Hypoglycemia history: 0 SMBG readings < 70mg/dL since last appt as he did not have his glucometer with him   Unsymptomatic hypoglycemia: No, reports hypoglycemia s/sx x1 since last appt d/t not eating breakfast  Has noticed occasional s/sx when SMBG is ~130       3  Lifestyle: has dentures  Continues to cut out regular soda, rice and past since last appt  Has been drinking green tea, which contains 35gm of CHO in the whole bottle but has small portion   Has been eating more snacks at night (yogurt, tomatos) and eating more rice since last appt     Breakfast: coffee, oats + 6-8 blueberries      Lunch: seafood salad      Dinner: vegetables, chicken - started to eat at 4PM since starting insulin     Snacks: popcorn, cheese, cherry tomatoes      Exercise: limited, no formal     Social History     Tobacco Use   Smoking Status Current Every Day Smoker    Packs/day: 0 50   Smokeless Tobacco Never Used     Social History     Substance and Sexual Activity   Alcohol Use No       OBJECTIVE                                                                                                      SMBG readings: checks BID  FBG Average: 178 mg/dl (132-231 mg/dl), n=10, 0/10= < 70 mg/dl   HS BG Average: 195 mg/dl (153-225 mg/dl), n=10, 0/10= < 70 mg/dl     Pertinent Lab Data:    Lab Results   Component Value Date    SODIUM 129 (L) 05/22/2020    K 4 1 05/22/2020    CL 99 (L) 05/22/2020    CO2 20 (L) 05/22/2020    BUN 23 05/22/2020    CREATININE 1 10 05/22/2020    GLUC 494 (H) 05/22/2020    CALCIUM 9 2 05/22/2020       Lab Results   Component Value Date    HGBA1C >14 0 (H) 05/21/2020     Microalbumin/Creatinine: 73 (5/2020)    Demographics  Interaction Method: Phone  Type of Intervention: Follow-Up    Topic(s) Addressed  Diabetes    Intervention(s) Made    Pharmacologic:  Medication Initiation: GLP1    Medication Adjustment - Dose or Frequency: Empagliflozin    Medication Conversion - Non-Preferred to Preferred: Combination empagliflozin/metformin XR    Non-Pharmacologic:      Other: Lifestyle, hypoglycemia    Tool(s) Used  Not Applicable    Time Spent:   Time Spent in Direct Patient Care: 25 minutes    Time Spent in Care Coordination: 15 minutes    Recommendation(s) Accepted by the Patient/Caregiver: Partially Accepted

## 2020-07-23 DIAGNOSIS — E11.9 CONTROLLED TYPE 2 DIABETES MELLITUS WITHOUT COMPLICATION, WITHOUT LONG-TERM CURRENT USE OF INSULIN (HCC): ICD-10-CM

## 2020-07-23 DIAGNOSIS — E78.5 DYSLIPIDEMIA: ICD-10-CM

## 2020-07-23 RX ORDER — ATORVASTATIN CALCIUM 20 MG/1
TABLET, FILM COATED ORAL
Qty: 30 TABLET | Refills: 5 | Status: SHIPPED | OUTPATIENT
Start: 2020-07-23 | End: 2021-03-04

## 2020-07-28 ENCOUNTER — DOCUMENTATION (OUTPATIENT)
Dept: INTERNAL MEDICINE CLINIC | Facility: CLINIC | Age: 65
End: 2020-07-28

## 2020-07-28 NOTE — PROGRESS NOTES
Flora, PharmD, BCACP    The following is per review of patient's pertinent medical/medication history and phone call with patient:    Reason for documentation: Call received from patient    Subjective/Objective: would like to do separate SGLT2i and metformin d/t lower cost      SMB-140    Review of Systems   Gastrointestinal: Positive for abdominal pain  Negative for constipation, diarrhea and nausea  Bloating   Genitourinary: Negative  Skin: Negative for color change and rash (but had a rash for a couple of days, self-resolved; reports rash was different from cellulitis he had a couple of month ago)  Assessment/Plan:  Diabetes: bloating is not associated ADR with empagliflozin  Uncertain if patient had allergic reaction to empagliflozin high-dose  Would benefit from  metformin and SGLT2i d/t increased cost    · If PCP is in agreeance, will have patient reduce empagliflozin dose to 10mg/day and continue other DM medications as prescribed  Will hold off on  metformin and empagliflozin   · Keep scheduled pharm appt on 8/3  Agrees to f/u with PCP if s/sx worsen  PCP: see above, please f/u with patient if additional work-up needed    Demographics  Interaction Method: Phone  Type of Intervention: Follow-Up    Topic(s) Addressed  Diabetes    Intervention(s) Made    Pharmacologic:      Medication Adjustment - Dose or Frequency: Jardiance    Prevent or Manage Adverse Drug Reaction: Jardiance    Tool(s) Used  Not Applicable    Time Spent:   Time Spent in Direct Patient Care: 10 minutes    Time Spent in Care Coordination: 10 minutes    Recommendation(s) Accepted by the Patient/Caregiver:  All Accepted

## 2020-08-05 DIAGNOSIS — E08.65 DIABETES MELLITUS DUE TO UNDERLYING CONDITION, UNCONTROLLED, WITH HYPERGLYCEMIA (HCC): ICD-10-CM

## 2020-08-05 RX ORDER — LANCETS 33 GAUGE
EACH MISCELLANEOUS
Qty: 100 EACH | Refills: 1 | Status: SHIPPED | OUTPATIENT
Start: 2020-08-05 | End: 2020-10-15

## 2020-08-10 ENCOUNTER — CLINICAL SUPPORT (OUTPATIENT)
Dept: INTERNAL MEDICINE CLINIC | Facility: CLINIC | Age: 65
End: 2020-08-10

## 2020-08-10 DIAGNOSIS — E08.65 DIABETES MELLITUS DUE TO UNDERLYING CONDITION, UNCONTROLLED, WITH HYPERGLYCEMIA (HCC): Primary | ICD-10-CM

## 2020-08-10 NOTE — TELEPHONE ENCOUNTER
Per pharmacist encounter on 08/10/20, pending orders for signature/concurrence from Cierra Ahuja MD    Empagliflozin   Metformin XR

## 2020-08-10 NOTE — PROGRESS NOTES
Flora, PharmD, BCACP    Reason for visit: Appointment with 62y o  year old for management of T2DM monitoring efficacy and tolerability of anti-diabetic medications  This virtual check-in was done via phone  Encounter provider: Zenaida Torres, Pharmacist    Patient agrees to participate in a virtual check in via telephone or video visit instead of presenting to the office to address urgent/immediate medical needs  After connecting through telephone, the patient was identified by name and date of birth  Ashia Byes was informed that this was a telemedicine visit and that the exam was being conducted confidentially over secure lines  My office door was closed  No one else was in the room  Ashia Black acknowledged consent and understanding of privacy and security of the telemedicine visit  I informed the patient that I have reviewed He record in Epic and presented the opportunity for He to ask any questions regarding the visit today  The patient agreed to participate  Current DM Regimen:  · Insulin glargine 38 units daily  · Metformin 2gm/day  · Empagliflozin 10mg daily    ASSESSMENT/PLAN                                                                                     1  Type 2 diabetes: goal A1c <7% based on 2020 ADA guidelines  Most recent A1c above goal but not reflective of current treatment  SMBG avg to goal without hypoglycemia  May experience hypoglycemia s/sx if he starts increasing physical activity      Patient's previous ADR to empagliflozin 20mg/dose have resolved when patient reduced dose                 BMP shows hyperglycemia with possible dilutional hyponatremia and hypochloremia        Duration: < 5 years (dx 2018)  Microvascular complications: microalbuminuria  Macrovascular complications: none  (Yes) Aspirin   (Yes) Statin     (No) ACEI/ARB  Eye Exam: 6/18/2020  Foot Exam: 5/18/2020        Most recent labs and diabetes goals discussed with patient in clinic today   MEDICATIONS: If PCP is in agreeance, will continue with current regimen - empagliflozin 10mg daily, metformin 2gm/day and insulin glargine 38 units daily  Patient aware of hypoglycemia risk d/t increased physical activity  o Will pend rx for PCP signature/concurrence   SMBG: BID   TLCs: Re-enforced the importance of a Diabetic Diet, exercise 30 minutes 5 days a week as tolerated, weight loss/control  2  Medication Reconciliation: Medication list reviewed with pt at today's visit  Discrepancies as discussed below  - Medication list updated to reflect medications pt is currently taking   - Agrees to contact PCP if rash worsens, will see UC if s/sx anaphylaxis    Follow-Up: 1 month after PCP appt  _x_ Home Monitoring Records, BG  _x_ Labs, as ordered by PCP - will plan to update       SUBJECTIVE                                                                                                              1  Medication Adherence: Medication list reviewed with patient, reports the following discrepancies/problems:   none    Misses doses:  no    Reports increased copay if he were to combine metformin and empagliflozin, would like to continue with  rx's  Needs new empagliflozin rx  2  Medication Efficacy:    Review of Systems   Constitutional:        Feels better overall   Gastrointestinal: Negative for abdominal pain (resolved since reducing empagliflozin dose)  Skin: Positive for rash (continues to get sporadic rash that self-resovles with topical diphenhydramine, does not feel they are bothersome and denies other allergy s/sx, feels it could be r/t to his cat instead of medication)  Hypoglycemia history: 0 SMBG readings < 70mg/dL since last appt   Knows how to treat: Yes   Unsymptomatic hypoglycemia: No, reports hypoglycemia s/sx x0 since last appt    3   Lifestyle: has been trying to reduce CHO intake during the day, had increased bread and pasta yesterday but FBG the next day was 142  Has not really increased physical activity but plans to start trying to walk more throughout the day and increase activity level  Social History     Tobacco Use   Smoking Status Current Every Day Smoker    Packs/day: 0 50   Smokeless Tobacco Never Used     Social History     Substance and Sexual Activity   Alcohol Use No          OBJECTIVE                                                                                                      SMBG readings: checks BID    FBG Average: 121 mg/dl (103-142 mg/dl), n=10, 0/10= < 70 mg/dl   Pre-Supper BG Average: 126 mg/dl (105-156 mg/dl), n=9, 0/9= < 70 mg/dl     Pertinent Lab Data:    Lab Results   Component Value Date    SODIUM 129 (L) 05/22/2020    K 4 1 05/22/2020    CL 99 (L) 05/22/2020    CO2 20 (L) 05/22/2020    BUN 23 05/22/2020    CREATININE 1 10 05/22/2020    GLUC 494 (H) 05/22/2020    CALCIUM 9 2 05/22/2020       Lab Results   Component Value Date    HGBA1C >14 0 (H) 05/21/2020       Microalbumin/Creatinine: 73 (5/2020)    Demographics  Interaction Method: Phone  Type of Intervention: Follow-Up    Topic(s) Addressed  Diabetes    Intervention(s) Made      Non-Pharmacologic:      Other: Lifestyle, rash    Tool(s) Used  Not Applicable    Time Spent:   Time Spent in Direct Patient Care: 30 minutes    Time Spent in Care Coordination: 10 minutes    Recommendation(s) Accepted by the Patient/Caregiver:  All Accepted

## 2020-08-11 RX ORDER — METFORMIN HYDROCHLORIDE 500 MG/1
2000 TABLET, EXTENDED RELEASE ORAL DAILY
Qty: 360 TABLET | Refills: 1 | Status: SHIPPED | OUTPATIENT
Start: 2020-08-11 | End: 2021-02-18

## 2020-09-10 ENCOUNTER — APPOINTMENT (OUTPATIENT)
Dept: LAB | Facility: CLINIC | Age: 65
End: 2020-09-10
Payer: COMMERCIAL

## 2020-09-10 DIAGNOSIS — E78.5 DYSLIPIDEMIA: ICD-10-CM

## 2020-09-10 DIAGNOSIS — I10 BENIGN ESSENTIAL HYPERTENSION: ICD-10-CM

## 2020-09-10 DIAGNOSIS — E08.65 DIABETES MELLITUS DUE TO UNDERLYING CONDITION, UNCONTROLLED, WITH HYPERGLYCEMIA (HCC): ICD-10-CM

## 2020-09-10 LAB
ALBUMIN SERPL BCP-MCNC: 4.2 G/DL (ref 3.5–5)
ALP SERPL-CCNC: 170 U/L (ref 46–116)
ALT SERPL W P-5'-P-CCNC: 20 U/L (ref 12–78)
ANION GAP SERPL CALCULATED.3IONS-SCNC: 8 MMOL/L (ref 4–13)
AST SERPL W P-5'-P-CCNC: 9 U/L (ref 5–45)
BASOPHILS # BLD AUTO: 0.06 THOUSANDS/ΜL (ref 0–0.1)
BASOPHILS NFR BLD AUTO: 1 % (ref 0–1)
BILIRUB SERPL-MCNC: 0.72 MG/DL (ref 0.2–1)
BUN SERPL-MCNC: 22 MG/DL (ref 5–25)
CALCIUM SERPL-MCNC: 9.3 MG/DL (ref 8.3–10.1)
CHLORIDE SERPL-SCNC: 110 MMOL/L (ref 100–108)
CHOLEST SERPL-MCNC: 90 MG/DL (ref 50–200)
CO2 SERPL-SCNC: 22 MMOL/L (ref 21–32)
CREAT SERPL-MCNC: 1.05 MG/DL (ref 0.6–1.3)
CREAT UR-MCNC: 36.4 MG/DL
EOSINOPHIL # BLD AUTO: 0.15 THOUSAND/ΜL (ref 0–0.61)
EOSINOPHIL NFR BLD AUTO: 2 % (ref 0–6)
ERYTHROCYTE [DISTWIDTH] IN BLOOD BY AUTOMATED COUNT: 14.6 % (ref 11.6–15.1)
EST. AVERAGE GLUCOSE BLD GHB EST-MCNC: 166 MG/DL
GFR SERPL CREATININE-BSD FRML MDRD: 75 ML/MIN/1.73SQ M
GLUCOSE P FAST SERPL-MCNC: 100 MG/DL (ref 65–99)
HBA1C MFR BLD: 7.4 %
HCT VFR BLD AUTO: 45.3 % (ref 36.5–49.3)
HDLC SERPL-MCNC: 34 MG/DL
HGB BLD-MCNC: 14.6 G/DL (ref 12–17)
IMM GRANULOCYTES # BLD AUTO: 0.03 THOUSAND/UL (ref 0–0.2)
IMM GRANULOCYTES NFR BLD AUTO: 0 % (ref 0–2)
LDLC SERPL CALC-MCNC: 45 MG/DL (ref 0–100)
LYMPHOCYTES # BLD AUTO: 2.56 THOUSANDS/ΜL (ref 0.6–4.47)
LYMPHOCYTES NFR BLD AUTO: 33 % (ref 14–44)
MCH RBC QN AUTO: 32.2 PG (ref 26.8–34.3)
MCHC RBC AUTO-ENTMCNC: 32.2 G/DL (ref 31.4–37.4)
MCV RBC AUTO: 100 FL (ref 82–98)
MICROALBUMIN UR-MCNC: 5.6 MG/L (ref 0–20)
MICROALBUMIN/CREAT 24H UR: 15 MG/G CREATININE (ref 0–30)
MONOCYTES # BLD AUTO: 0.6 THOUSAND/ΜL (ref 0.17–1.22)
MONOCYTES NFR BLD AUTO: 8 % (ref 4–12)
NEUTROPHILS # BLD AUTO: 4.45 THOUSANDS/ΜL (ref 1.85–7.62)
NEUTS SEG NFR BLD AUTO: 56 % (ref 43–75)
NONHDLC SERPL-MCNC: 56 MG/DL
NRBC BLD AUTO-RTO: 0 /100 WBCS
PLATELET # BLD AUTO: 121 THOUSANDS/UL (ref 149–390)
PMV BLD AUTO: 10.7 FL (ref 8.9–12.7)
POTASSIUM SERPL-SCNC: 4.4 MMOL/L (ref 3.5–5.3)
PROT SERPL-MCNC: 8.1 G/DL (ref 6.4–8.2)
RBC # BLD AUTO: 4.53 MILLION/UL (ref 3.88–5.62)
SODIUM SERPL-SCNC: 140 MMOL/L (ref 136–145)
TRIGL SERPL-MCNC: 57 MG/DL
WBC # BLD AUTO: 7.85 THOUSAND/UL (ref 4.31–10.16)

## 2020-09-10 PROCEDURE — 83036 HEMOGLOBIN GLYCOSYLATED A1C: CPT

## 2020-09-10 PROCEDURE — 82570 ASSAY OF URINE CREATININE: CPT

## 2020-09-10 PROCEDURE — 3061F NEG MICROALBUMINURIA REV: CPT | Performed by: INTERNAL MEDICINE

## 2020-09-10 PROCEDURE — 82043 UR ALBUMIN QUANTITATIVE: CPT

## 2020-09-10 PROCEDURE — 80053 COMPREHEN METABOLIC PANEL: CPT

## 2020-09-10 PROCEDURE — 85025 COMPLETE CBC W/AUTO DIFF WBC: CPT

## 2020-09-10 PROCEDURE — 80061 LIPID PANEL: CPT

## 2020-09-10 PROCEDURE — 36415 COLL VENOUS BLD VENIPUNCTURE: CPT

## 2020-09-10 PROCEDURE — 3051F HG A1C>EQUAL 7.0%<8.0%: CPT | Performed by: INTERNAL MEDICINE

## 2020-09-15 ENCOUNTER — OFFICE VISIT (OUTPATIENT)
Dept: INTERNAL MEDICINE CLINIC | Facility: CLINIC | Age: 65
End: 2020-09-15
Payer: COMMERCIAL

## 2020-09-15 VITALS
WEIGHT: 198.6 LBS | HEIGHT: 68 IN | BODY MASS INDEX: 30.1 KG/M2 | DIASTOLIC BLOOD PRESSURE: 64 MMHG | TEMPERATURE: 98.5 F | SYSTOLIC BLOOD PRESSURE: 140 MMHG | HEART RATE: 73 BPM | OXYGEN SATURATION: 99 %

## 2020-09-15 DIAGNOSIS — R21 RASH AND NONSPECIFIC SKIN ERUPTION: ICD-10-CM

## 2020-09-15 DIAGNOSIS — E08.65 DIABETES MELLITUS DUE TO UNDERLYING CONDITION, UNCONTROLLED, WITH HYPERGLYCEMIA (HCC): Primary | ICD-10-CM

## 2020-09-15 DIAGNOSIS — E78.5 DYSLIPIDEMIA: ICD-10-CM

## 2020-09-15 DIAGNOSIS — I10 BENIGN ESSENTIAL HYPERTENSION: ICD-10-CM

## 2020-09-15 PROCEDURE — 3077F SYST BP >= 140 MM HG: CPT | Performed by: INTERNAL MEDICINE

## 2020-09-15 PROCEDURE — 3078F DIAST BP <80 MM HG: CPT | Performed by: INTERNAL MEDICINE

## 2020-09-15 PROCEDURE — 4004F PT TOBACCO SCREEN RCVD TLK: CPT | Performed by: INTERNAL MEDICINE

## 2020-09-15 PROCEDURE — 99214 OFFICE O/P EST MOD 30 MIN: CPT | Performed by: INTERNAL MEDICINE

## 2020-09-15 NOTE — PROGRESS NOTES
Assessment/Plan:  Continue current regimen  BMI Counseling: Body mass index is 30 2 kg/m²  The BMI is above normal  Nutrition recommendations include decreasing overall calorie intake  F/u with dermatology foe multiple skin lesions on the face and wound on the right arm although this is actually healing  Tobacco Cessation Counseling: Tobacco cessation counseling and education was provided  The patient is sincerely urged to quit consumption of tobacco  He is not ready to quit tobacco  The numerous health risks of tobacco consumption were discussed  If he decides to quit, there are a number of helpful adjunctive aids, and he can see me to discuss nicotine replacement therapy, chantix, or bupropion anytime in the future  Problem List Items Addressed This Visit        Endocrine    Diabetes mellitus due to underlying condition, uncontrolled, with hyperglycemia ([facility] [address] ) - Primary    Relevant Orders    CBC and Platelet    Comprehensive metabolic panel    HEMOGLOBIN A1C W/ EAG ESTIMATION    Lipid Panel with Direct LDL reflex       Cardiovascular and Mediastinum    Benign essential hypertension    Relevant Orders    CBC and Platelet    Comprehensive metabolic panel       Other    Dyslipidemia    Relevant Orders    Comprehensive metabolic panel    Lipid Panel with Direct LDL reflex      Other Visit Diagnoses     Rash and nonspecific skin eruption        Relevant Orders    Ambulatory referral to Dermatology            Subjective:      Patient ID: Feliciano Barbosa is a 60 y o  male      HPI  A1C 7 4 from 14 in May  -140 and before supper in the 140s  1 hypoglycemic spell >1month ago  LDL 45 TG 57  plt count slightly low at 120  Normal creatinine  Denies polyuria, polydipsia  Compliant with Jardiance metformin and Lantus 38units daily  Working with our clinical pharmacist    The following portions of the patient's history were reviewed and updated as appropriate: allergies, current medications, past family history, past medical history, past social history, past surgical history and problem list     Review of Systems   Constitutional: Negative for chills, fever and unexpected weight change  HENT: Negative for congestion, rhinorrhea and sore throat  Respiratory: Negative for cough and shortness of breath  Cardiovascular: Negative for chest pain, palpitations and leg swelling  Gastrointestinal: Negative for abdominal pain, constipation, diarrhea, nausea and vomiting  Endocrine: Negative for polydipsia and polyuria  Genitourinary: Negative for difficulty urinating  Neurological: Negative for dizziness and headaches  Objective:      /64   Pulse 73   Temp 98 5 °F (36 9 °C)   Ht 5' 8" (1 727 m)   Wt 90 1 kg (198 lb 9 6 oz)   SpO2 99%   BMI 30 20 kg/m²          Physical Exam  Constitutional:       Appearance: He is well-developed  HENT:      Head: Normocephalic and atraumatic  Eyes:      Conjunctiva/sclera: Conjunctivae normal    Neck:      Musculoskeletal: Neck supple  Cardiovascular:      Rate and Rhythm: Normal rate and regular rhythm  Heart sounds: Normal heart sounds  No murmur  Pulmonary:      Effort: Pulmonary effort is normal  No respiratory distress  Breath sounds: Normal breath sounds  No wheezing or rales  Musculoskeletal:      Right lower leg: No edema  Left lower leg: No edema  Skin:     Comments: Tan-brown raised lesions on the face  Ill defined light pink patch with scabs right forearm   Neurological:      Mental Status: He is alert and oriented to person, place, and time  Psychiatric:         Mood and Affect: Mood normal          Behavior: Behavior normal          Thought Content:  Thought content normal          Judgment: Judgment normal

## 2020-10-14 DIAGNOSIS — E08.65 DIABETES MELLITUS DUE TO UNDERLYING CONDITION, UNCONTROLLED, WITH HYPERGLYCEMIA (HCC): ICD-10-CM

## 2020-10-15 RX ORDER — LANCETS 33 GAUGE
EACH MISCELLANEOUS
Qty: 100 EACH | Refills: 1 | Status: SHIPPED | OUTPATIENT
Start: 2020-10-15 | End: 2021-07-12

## 2020-10-26 ENCOUNTER — CLINICAL SUPPORT (OUTPATIENT)
Dept: INTERNAL MEDICINE CLINIC | Facility: CLINIC | Age: 65
End: 2020-10-26

## 2020-10-26 DIAGNOSIS — E08.65 DIABETES MELLITUS DUE TO UNDERLYING CONDITION, UNCONTROLLED, WITH HYPERGLYCEMIA (HCC): Primary | ICD-10-CM

## 2020-11-16 ENCOUNTER — CLINICAL SUPPORT (OUTPATIENT)
Dept: INTERNAL MEDICINE CLINIC | Facility: CLINIC | Age: 65
End: 2020-11-16

## 2020-11-16 DIAGNOSIS — E08.65 DIABETES MELLITUS DUE TO UNDERLYING CONDITION, UNCONTROLLED, WITH HYPERGLYCEMIA (HCC): Primary | ICD-10-CM

## 2020-11-17 ENCOUNTER — VBI (OUTPATIENT)
Dept: ADMINISTRATIVE | Facility: OTHER | Age: 65
End: 2020-11-17

## 2020-12-02 ENCOUNTER — VBI (OUTPATIENT)
Dept: ADMINISTRATIVE | Facility: OTHER | Age: 65
End: 2020-12-02

## 2020-12-17 ENCOUNTER — CLINICAL SUPPORT (OUTPATIENT)
Dept: INTERNAL MEDICINE CLINIC | Facility: CLINIC | Age: 65
End: 2020-12-17

## 2020-12-17 DIAGNOSIS — E08.65 DIABETES MELLITUS DUE TO UNDERLYING CONDITION, UNCONTROLLED, WITH HYPERGLYCEMIA (HCC): Primary | ICD-10-CM

## 2020-12-17 DIAGNOSIS — E08.65 DIABETES MELLITUS DUE TO UNDERLYING CONDITION, UNCONTROLLED, WITH HYPERGLYCEMIA (HCC): ICD-10-CM

## 2020-12-17 RX ORDER — INSULIN GLARGINE 100 [IU]/ML
35 INJECTION, SOLUTION SUBCUTANEOUS DAILY
Qty: 10 PEN | Refills: 1 | Status: SHIPPED | OUTPATIENT
Start: 2020-12-17 | End: 2021-04-16

## 2020-12-20 DIAGNOSIS — E08.65 DIABETES MELLITUS DUE TO UNDERLYING CONDITION, UNCONTROLLED, WITH HYPERGLYCEMIA (HCC): ICD-10-CM

## 2020-12-20 RX ORDER — PEN NEEDLE, DIABETIC 31 GX5/16"
NEEDLE, DISPOSABLE MISCELLANEOUS
Qty: 100 EACH | Refills: 1 | Status: SHIPPED | OUTPATIENT
Start: 2020-12-20 | End: 2021-07-06

## 2021-01-21 ENCOUNTER — DOCUMENTATION (OUTPATIENT)
Dept: INTERNAL MEDICINE CLINIC | Facility: CLINIC | Age: 66
End: 2021-01-21

## 2021-01-21 DIAGNOSIS — F41.9 ANXIETY: ICD-10-CM

## 2021-01-21 RX ORDER — ESCITALOPRAM OXALATE 20 MG/1
TABLET ORAL
Qty: 30 TABLET | Refills: 5 | Status: SHIPPED | OUTPATIENT
Start: 2021-01-21 | End: 2021-12-09

## 2021-01-21 NOTE — PROGRESS NOTES
Flora, PharmD, Torsten Coopre    The following is per review of patient's pertinent medical/medication history and phone call with patient:    Reason for documentation: Call received from patient    Patient received letter from [facility] insurance stating Lantus, Jardiance and generic Glucophage XR were no longer covered and he should contact prescriber for alternatives  Informed patient it appears Jardiance, Lantus and generic Glucophage XR are preferred options for their respective medications classes; patient will contact insurance to clarify letter as he may be required to receive rx's through other pharmacy  Patient has enough rx's to last until PCP/PharmD appt next week  Will discuss findings with patient at that time  Demographics  Interaction Method: Phone  Type of Intervention: Follow-Up    Topic(s) Addressed  Diabetes    Intervention(s) Made      Non-Pharmacologic:      Other    Tool(s) Used  Not Applicable    Time Spent:   Time Spent in Direct Patient Care: 10 minutes    Time Spent in Care Coordination: 10 minutes    Recommendation(s) Accepted by the Patient/Caregiver:  All Accepted

## 2021-01-21 NOTE — PROGRESS NOTES
Patient called pharmacist back  Was informed by insurance generic Glucophage XR and Lantus are covered and he has minimal copay  Was also informed Helga Alberta was covered however he has a copay for rx, which is likely r/t patient needing to meet rx deductible

## 2021-01-22 ENCOUNTER — LAB (OUTPATIENT)
Dept: LAB | Facility: CLINIC | Age: 66
End: 2021-01-22
Payer: COMMERCIAL

## 2021-01-22 DIAGNOSIS — E78.5 DYSLIPIDEMIA: ICD-10-CM

## 2021-01-22 DIAGNOSIS — R74.8 ELEVATED ALKALINE PHOSPHATASE LEVEL: Primary | ICD-10-CM

## 2021-01-22 DIAGNOSIS — R74.8 ELEVATED ALKALINE PHOSPHATASE LEVEL: ICD-10-CM

## 2021-01-22 DIAGNOSIS — E08.65 DIABETES MELLITUS DUE TO UNDERLYING CONDITION, UNCONTROLLED, WITH HYPERGLYCEMIA (HCC): ICD-10-CM

## 2021-01-22 DIAGNOSIS — I10 BENIGN ESSENTIAL HYPERTENSION: ICD-10-CM

## 2021-01-22 LAB
ALBUMIN SERPL BCP-MCNC: 3.9 G/DL (ref 3.5–5)
ALP SERPL-CCNC: 251 U/L (ref 46–116)
ALT SERPL W P-5'-P-CCNC: 24 U/L (ref 12–78)
ANION GAP SERPL CALCULATED.3IONS-SCNC: 5 MMOL/L (ref 4–13)
AST SERPL W P-5'-P-CCNC: 14 U/L (ref 5–45)
BILIRUB SERPL-MCNC: 0.74 MG/DL (ref 0.2–1)
BUN SERPL-MCNC: 21 MG/DL (ref 5–25)
CALCIUM SERPL-MCNC: 9.9 MG/DL (ref 8.3–10.1)
CHLORIDE SERPL-SCNC: 108 MMOL/L (ref 100–108)
CHOLEST SERPL-MCNC: 95 MG/DL (ref 50–200)
CO2 SERPL-SCNC: 29 MMOL/L (ref 21–32)
CREAT SERPL-MCNC: 0.87 MG/DL (ref 0.6–1.3)
ERYTHROCYTE [DISTWIDTH] IN BLOOD BY AUTOMATED COUNT: 15.6 % (ref 11.6–15.1)
EST. AVERAGE GLUCOSE BLD GHB EST-MCNC: 157 MG/DL
GFR SERPL CREATININE-BSD FRML MDRD: 91 ML/MIN/1.73SQ M
GLUCOSE P FAST SERPL-MCNC: 103 MG/DL (ref 65–99)
HBA1C MFR BLD: 7.1 %
HCT VFR BLD AUTO: 45.1 % (ref 36.5–49.3)
HDLC SERPL-MCNC: 33 MG/DL
HGB BLD-MCNC: 14.3 G/DL (ref 12–17)
LDLC SERPL CALC-MCNC: 45 MG/DL (ref 0–100)
MCH RBC QN AUTO: 31.4 PG (ref 26.8–34.3)
MCHC RBC AUTO-ENTMCNC: 31.7 G/DL (ref 31.4–37.4)
MCV RBC AUTO: 99 FL (ref 82–98)
PLATELET # BLD AUTO: 114 THOUSANDS/UL (ref 149–390)
PMV BLD AUTO: 11.3 FL (ref 8.9–12.7)
POTASSIUM SERPL-SCNC: 4.5 MMOL/L (ref 3.5–5.3)
PROT SERPL-MCNC: 7.9 G/DL (ref 6.4–8.2)
RBC # BLD AUTO: 4.55 MILLION/UL (ref 3.88–5.62)
SODIUM SERPL-SCNC: 142 MMOL/L (ref 136–145)
TRIGL SERPL-MCNC: 84 MG/DL
WBC # BLD AUTO: 6.96 THOUSAND/UL (ref 4.31–10.16)

## 2021-01-22 PROCEDURE — 80061 LIPID PANEL: CPT

## 2021-01-22 PROCEDURE — 85027 COMPLETE CBC AUTOMATED: CPT

## 2021-01-22 PROCEDURE — 36415 COLL VENOUS BLD VENIPUNCTURE: CPT

## 2021-01-22 PROCEDURE — 82977 ASSAY OF GGT: CPT

## 2021-01-22 PROCEDURE — 83036 HEMOGLOBIN GLYCOSYLATED A1C: CPT

## 2021-01-22 PROCEDURE — 80053 COMPREHEN METABOLIC PANEL: CPT

## 2021-01-22 PROCEDURE — 3051F HG A1C>EQUAL 7.0%<8.0%: CPT | Performed by: INTERNAL MEDICINE

## 2021-01-23 LAB — GGT SERPL-CCNC: 426 U/L (ref 5–85)

## 2021-01-28 ENCOUNTER — OFFICE VISIT (OUTPATIENT)
Dept: INTERNAL MEDICINE CLINIC | Facility: CLINIC | Age: 66
End: 2021-01-28
Payer: COMMERCIAL

## 2021-01-28 ENCOUNTER — CLINICAL SUPPORT (OUTPATIENT)
Dept: INTERNAL MEDICINE CLINIC | Facility: CLINIC | Age: 66
End: 2021-01-28
Payer: COMMERCIAL

## 2021-01-28 VITALS
TEMPERATURE: 96.1 F | OXYGEN SATURATION: 99 % | BODY MASS INDEX: 29.48 KG/M2 | SYSTOLIC BLOOD PRESSURE: 148 MMHG | HEART RATE: 84 BPM | WEIGHT: 194.5 LBS | DIASTOLIC BLOOD PRESSURE: 84 MMHG | HEIGHT: 68 IN

## 2021-01-28 DIAGNOSIS — E08.65 DIABETES MELLITUS DUE TO UNDERLYING CONDITION, UNCONTROLLED, WITH HYPERGLYCEMIA (HCC): Primary | ICD-10-CM

## 2021-01-28 DIAGNOSIS — Z00.00 WELLNESS EXAMINATION: ICD-10-CM

## 2021-01-28 DIAGNOSIS — Z11.4 ENCOUNTER FOR SCREENING FOR HIV: ICD-10-CM

## 2021-01-28 DIAGNOSIS — R74.8 ELEVATED ALKALINE PHOSPHATASE LEVEL: ICD-10-CM

## 2021-01-28 DIAGNOSIS — E66.9 OBESITY WITH SERIOUS COMORBIDITY, UNSPECIFIED CLASSIFICATION, UNSPECIFIED OBESITY TYPE: ICD-10-CM

## 2021-01-28 DIAGNOSIS — I10 BENIGN ESSENTIAL HYPERTENSION: ICD-10-CM

## 2021-01-28 DIAGNOSIS — F17.200 CURRENT SMOKER: ICD-10-CM

## 2021-01-28 DIAGNOSIS — Z12.5 SCREENING PSA (PROSTATE SPECIFIC ANTIGEN): ICD-10-CM

## 2021-01-28 DIAGNOSIS — Z12.11 SCREEN FOR COLON CANCER: ICD-10-CM

## 2021-01-28 DIAGNOSIS — E55.9 VITAMIN D DEFICIENCY: ICD-10-CM

## 2021-01-28 DIAGNOSIS — E78.5 DYSLIPIDEMIA: ICD-10-CM

## 2021-01-28 PROCEDURE — 99396 PREV VISIT EST AGE 40-64: CPT | Performed by: INTERNAL MEDICINE

## 2021-01-28 PROCEDURE — 3008F BODY MASS INDEX DOCD: CPT | Performed by: INTERNAL MEDICINE

## 2021-01-28 PROCEDURE — 3077F SYST BP >= 140 MM HG: CPT | Performed by: INTERNAL MEDICINE

## 2021-01-28 PROCEDURE — 4004F PT TOBACCO SCREEN RCVD TLK: CPT | Performed by: INTERNAL MEDICINE

## 2021-01-28 PROCEDURE — 99214 OFFICE O/P EST MOD 30 MIN: CPT | Performed by: INTERNAL MEDICINE

## 2021-01-28 PROCEDURE — 3079F DIAST BP 80-89 MM HG: CPT | Performed by: INTERNAL MEDICINE

## 2021-01-28 NOTE — PROGRESS NOTES
Assessment/Plan:    Diabetes mellitus due to underlying condition, uncontrolled, with hyperglycemia ([facility] [address] )  Continue metformin Jardiance and Lant   Discussed starting an ARB which he declined today  He had a cough with lisinopril and had BLANCA with Benicar many years ago  I think it would be worth trying an ARB again at a low dose isaac with high BP  He was on amlodipine  But this was stopped duet to unexplained neuropathy then    Lab Results   Component Value Date    HGBA1C 7 1 (H) 01/22/2021       Benign essential hypertension  Elevated today  For now, continue metoprolol    Obesity  BMI Counseling: Body mass index is 29 57 kg/m²  The BMI is above normal  Nutrition recommendations include 3-5 servings of fruits/vegetables daily and consuming healthier snacks  Current smoker  Tobacco Cessation Counseling: Tobacco cessation counseling and education was provided  The patient is sincerely urged to quit consumption of tobacco  He is not ready to quit tobacco  The numerous health risks of tobacco consumption were discussed  If he decides to quit, there are a number of helpful adjunctive aids, and he can see me to discuss nicotine replacement therapy, chantix, or bupropion anytime in the future             Problem List Items Addressed This Visit        Endocrine    Diabetes mellitus due to underlying condition, uncontrolled, with hyperglycemia ([facility] [address] ) - Primary     Continue metformin Jardiance and Lant   Discussed starting an ARB which he declined today  He had a cough with lisinopril and had BLANCA with Benicar many years ago  I think it would be worth trying an ARB again at a low dose isaac with high BP  He was on amlodipine  But this was stopped duet to unexplained neuropathy then    Lab Results   Component Value Date    HGBA1C 7 1 (H) 01/22/2021            Relevant Orders    CBC and differential    Comprehensive metabolic panel    HEMOGLOBIN A1C W/ EAG ESTIMATION    Microalbumin / creatinine urine ratio    UA w Reflex to Microscopic w Reflex to Culture -Lab Collect       Cardiovascular and Mediastinum    Benign essential hypertension     Elevated today  For now, continue metoprolol         Relevant Orders    CBC and differential    Comprehensive metabolic panel       Other    Vitamin D deficiency    Relevant Orders    Vitamin D 25 hydroxy    Dyslipidemia    Relevant Orders    Comprehensive metabolic panel    Obesity     BMI Counseling: Body mass index is 29 57 kg/m²  The BMI is above normal  Nutrition recommendations include 3-5 servings of fruits/vegetables daily and consuming healthier snacks  Current smoker     Tobacco Cessation Counseling: Tobacco cessation counseling and education was provided  The patient is sincerely urged to quit consumption of tobacco  He is not ready to quit tobacco  The numerous health risks of tobacco consumption were discussed  If he decides to quit, there are a number of helpful adjunctive aids, and he can see me to discuss nicotine replacement therapy, chantix, or bupropion anytime in the future  Relevant Orders    CT lung screening program      Other Visit Diagnoses     Elevated alkaline phosphatase level        Relevant Orders    US liver    Comprehensive metabolic panel    Vitamin D 25 hydroxy    Screening PSA (prostate specific antigen)        Relevant Orders    PSA, Total Screen    Encounter for screening for HIV        Relevant Orders    HIV 1/2 Antigen/Antibody (4th Generation) w Reflex [facility]    Screen for colon cancer        Relevant Orders    Cologuard    Wellness examination                Subjective:      Patient ID: Obdulio Osman is a 60 y o  male      [facility]  Wellness  Up to date with metabolic screening  Due for PSA  Due for colon cancer screening  Continues to smoke  Diet is poor  No regular exercise  The following portions of the patient's history were reviewed and updated as appropriate: allergies, current medications, past family history, past medical history, past social history, past surgical history and problem list     Review of Systems   Constitutional: Negative for chills, fatigue, fever and unexpected weight change  HENT: Negative for congestion, rhinorrhea and sore throat  Respiratory: Negative for cough, shortness of breath and wheezing  Cardiovascular: Negative for chest pain, palpitations and leg swelling  Gastrointestinal: Negative for abdominal pain, constipation, diarrhea, nausea and vomiting  Genitourinary: Negative for difficulty urinating  Musculoskeletal: Negative for arthralgias and myalgias  Skin: Positive for rash (discolored patch on the right forearm  , now light in color, improved after 2 rounds of Abx; finally seeing derm soon)  Neurological: Negative for dizziness and headaches  Objective:      /84   Pulse 84   Temp (!) 96 1 °F (35 6 °C)   Ht 5' 8" (1 727 m)   Wt 88 2 kg (194 lb 8 oz)   SpO2 99%   BMI 29 57 kg/m²          Physical Exam  Constitutional:       Appearance: He is well-developed  He is not ill-appearing or toxic-appearing  HENT:      Head: Normocephalic and atraumatic  Eyes:      Conjunctiva/sclera: Conjunctivae normal    Neck:      Musculoskeletal: Neck supple  Cardiovascular:      Rate and Rhythm: Normal rate and regular rhythm  Heart sounds: Normal heart sounds  No murmur  Pulmonary:      Effort: Pulmonary effort is normal  No respiratory distress  Breath sounds: Rhonchi (at bases) present  No wheezing or rales  Abdominal:      General: There is no distension  Palpations: Abdomen is soft  There is no mass  Tenderness: There is no abdominal tenderness  There is no guarding or rebound  Musculoskeletal:      Right lower leg: No edema  Left lower leg: No edema  Skin:     General: Skin is warm and dry  Comments: Large hypopigmented patch right forearm   Neurological:      Mental Status: He is alert and oriented to person, place, and time     Psychiatric: Behavior: Behavior normal          Thought Content:  Thought content normal          Judgment: Judgment normal

## 2021-01-28 NOTE — PROGRESS NOTES
Assessment/Plan:    Diabetes mellitus due to underlying condition, uncontrolled, with hyperglycemia ([facility] [address] )  Continue metformin Jardiance and Lant   Discussed starting an ARB which he declined today  He had a cough with lisinopril and had BLANCA with Benicar many years ago  I think it would be worth trying an ARB again at a low dose isaac with high BP  He was on amlodipine  But this was stopped duet to unexplained neuropathy then    Lab Results   Component Value Date    HGBA1C 7 1 (H) 01/22/2021       Benign essential hypertension  Elevated today  For now, continue metoprolol    Obesity  BMI Counseling: Body mass index is 29 57 kg/m²  The BMI is above normal  Nutrition recommendations include 3-5 servings of fruits/vegetables daily and consuming healthier snacks  Current smoker  Tobacco Cessation Counseling: Tobacco cessation counseling and education was provided  The patient is sincerely urged to quit consumption of tobacco  He is not ready to quit tobacco  The numerous health risks of tobacco consumption were discussed  If he decides to quit, there are a number of helpful adjunctive aids, and he can see me to discuss nicotine replacement therapy, chantix, or bupropion anytime in the future             Problem List Items Addressed This Visit        Endocrine    Diabetes mellitus due to underlying condition, uncontrolled, with hyperglycemia ([facility] [address] ) - Primary     Continue metformin Jardiance and Lant   Discussed starting an ARB which he declined today  He had a cough with lisinopril and had BLANCA with Benicar many years ago  I think it would be worth trying an ARB again at a low dose isaac with high BP  He was on amlodipine  But this was stopped duet to unexplained neuropathy then    Lab Results   Component Value Date    HGBA1C 7 1 (H) 01/22/2021            Relevant Orders    CBC and differential    Comprehensive metabolic panel    HEMOGLOBIN A1C W/ EAG ESTIMATION    Microalbumin / creatinine urine ratio    UA w Reflex to Microscopic w Reflex to Culture -Lab Collect       Cardiovascular and Mediastinum    Benign essential hypertension     Elevated today  For now, continue metoprolol         Relevant Orders    CBC and differential    Comprehensive metabolic panel       Other    Vitamin D deficiency    Relevant Orders    Vitamin D 25 hydroxy    Dyslipidemia    Relevant Orders    Comprehensive metabolic panel    Obesity     BMI Counseling: Body mass index is 29 57 kg/m²  The BMI is above normal  Nutrition recommendations include 3-5 servings of fruits/vegetables daily and consuming healthier snacks  Current smoker     Tobacco Cessation Counseling: Tobacco cessation counseling and education was provided  The patient is sincerely urged to quit consumption of tobacco  He is not ready to quit tobacco  The numerous health risks of tobacco consumption were discussed  If he decides to quit, there are a number of helpful adjunctive aids, and he can see me to discuss nicotine replacement therapy, chantix, or bupropion anytime in the future  Relevant Orders    CT lung screening program      Other Visit Diagnoses     Elevated alkaline phosphatase level        Relevant Orders    US liver    Comprehensive metabolic panel    Vitamin D 25 hydroxy    Screening PSA (prostate specific antigen)        Relevant Orders    PSA, Total Screen    Encounter for screening for HIV        Relevant Orders    HIV 1/2 Antigen/Antibody (4th Generation) w Reflex [facility]    Screen for colon cancer        Relevant Orders    Cologuard    Wellness examination                Subjective:      Patient ID: Brenda Atkinson is a 60 y o  male      HPI  FBS   A1C improving 7 1 on Lantus 35 units Jardiance and metformin  Denies hypoglycemic spells  Checks sugar BID  Platelet count slightly low   with elevated GGT  Lipids well controlled LDL 45 on atorvastatin 20    The following portions of the patient's history were reviewed and updated as appropriate:  Review of Systems   Constitutional: Negative for chills, fatigue, fever and unexpected weight change  HENT: Negative for congestion, rhinorrhea and sore throat  Respiratory: Negative for cough, shortness of breath and wheezing  Cardiovascular: Negative for chest pain, palpitations and leg swelling  Gastrointestinal: Negative for abdominal pain, constipation, diarrhea, nausea and vomiting  Endocrine: Negative for polydipsia and polyuria  Genitourinary: Negative for difficulty urinating  Musculoskeletal: Negative for arthralgias and myalgias  Skin: Positive for rash (more like discoloration and is much better after 2 rounds of abx; seeing  dermatology)  Neurological: Negative for dizziness and headaches  Objective:      /84   Pulse 84   Temp (!) 96 1 °F (35 6 °C)   Ht 5' 8" (1 727 m)   Wt 88 2 kg (194 lb 8 oz)   SpO2 99%   BMI 29 57 kg/m²          Physical Exam  Constitutional:       General: He is not in acute distress  Appearance: He is well-developed  He is not ill-appearing, toxic-appearing or diaphoretic  HENT:      Head: Normocephalic and atraumatic  Eyes:      Conjunctiva/sclera: Conjunctivae normal    Neck:      Musculoskeletal: Neck supple  Cardiovascular:      Rate and Rhythm: Normal rate and regular rhythm  Heart sounds: Normal heart sounds  No murmur  Pulmonary:      Effort: Pulmonary effort is normal  No respiratory distress  Breath sounds: Normal breath sounds  No wheezing or rales  Abdominal:      General: There is no distension  Palpations: Abdomen is soft  There is no mass  Tenderness: There is no abdominal tenderness  There is no guarding or rebound  Skin:     General: Skin is warm and dry  Comments: Large hypopigmented patch on the right arm   Neurological:      Mental Status: He is alert and oriented to person, place, and time     Psychiatric:         Behavior: Behavior normal          Thought Content:  Thought content normal          Judgment: Judgment normal

## 2021-01-28 NOTE — PROGRESS NOTES
[id number] Violeta, PharmD, BCACP    Reason for visit: Appointment with 60y o  year old for management of T2DM  Patient was seen immediately after PCP appt today  Current DM Regimen:   Empagliflozin   Metformin   Insulin glargine    ASSESSMENT/PLAN                                                                                     1  Type 2 diabetes: goal A1c <7% based on 2020 ADA guidelines  Most recent A1c above goal but improved  SMBG avg to goal per patient report, no hypoglycemia  Per previous pharm encounter, patient would like to hold off on starting GLP1 as he would like to discuss with PCP further  BMP acceptable but with slight hyperchoremia  Duration: < 5 years (dx 2018)  Microvascular complications: h/o microalbuminuria  Macrovascular complications: none  (Yes) Aspirin   (Yes) Statin     (No) ACEI/ARB  Eye Exam: 6/18/2020  Foot Exam: 5/18/2020     Most recent labs and diabetes goals discussed with patient in clinic today   MEDICATIONS: Discussion with patient on starting GLP1 however patient would like to continue with current regimen as he will be entering Medicare in a few months If PCP is in agreeance, will have patient continue glargine 35 units daily, metformin and empagliflozin   SMBG: BID   TLCs: Patient agrees to go without HS snack    2  Medication Reconciliation: Medication list reviewed with pt at today's visit  Discrepancies as discussed below   Medication list updated to reflect medications pt is currently taking    Follow-Up: 6-8 weeks via phone  _x_ Home Monitoring Records, BG    SUBJECTIVE                                                                                                              1  Medication Adherence: Medication list reviewed with patient, reports the following discrepancies/problems:   None    Misses doses:  No    2   Medication Efficacy:    Hypoglycemia history: 0 SMBG readings < 70mg/dL since last appt   Unsymptomatic hypoglycemia: No, reports hypoglycemia s/sx x0 since last appt    SMBG (no log, per memory):  FB-140    Pre-supper B-160  1-hour Post-supper BG:  160-170    3  Lifestyle: will eat some sort of snack ~2 hours prior to supper (popcorn, cake)   Denies changes since last appt     Diet Recall      B - Food: oats +/- milk     L - Food: beans + pepper + onion     D - Food: varies, usually leftovers     Daily Beverages: limited juice and soda, may have milk with breakfast      Snacks: popcorn or cheese curls     Exercise: limited      Social History     Tobacco Use   Smoking Status Current Every Day Smoker    Packs/day: 0 50   Smokeless Tobacco Never Used     Social History     Substance and Sexual Activity   Alcohol Use No        OBJECTIVE                                                                                                      Pertinent Lab Data:    Lab Results   Component Value Date    SODIUM 142 2021    K 4 5 2021     2021    CO2 29 2021    BUN 21 2021    CREATININE 0 87 2021    GLUC 494 (H) 2020    CALCIUM 9 9 2021       Lab Results   Component Value Date    HGBA1C 7 1 (H) 2021     Microalbumin/Creatinine: 15 (2020)    Demographics  Interaction Method: Face to Face  Type of Intervention: Follow-Up    Topic(s) Addressed  Diabetes    Intervention(s) Made    Pharmacologic:  Medication Initiation    Non-Pharmacologic:      Other: lifestyle    Tool(s) Used  Not Applicable    Time Spent:   Time Spent in Direct Patient Care: 15 minutes    Time Spent in Care Coordination: 10 minutes    Recommendation(s) Accepted by the Patient/Caregiver: Partially Accepted

## 2021-01-28 NOTE — ASSESSMENT & PLAN NOTE
Continue metformin Jardiance and Lantus   Discussed starting an ARB which he declined today  He had a cough with lisinopril and had BLANCA with Benicar many years ago  I think it would be worth trying an ARB again at a low dose isaac with high BP  He was on amlodipine  But this was stopped duet to unexplained neuropathy then    Lab Results   Component Value Date    HGBA1C 7 1 (H) 01/22/2021

## 2021-01-28 NOTE — ASSESSMENT & PLAN NOTE
BMI Counseling: Body mass index is 29 57 kg/m²  The BMI is above normal  Nutrition recommendations include 3-5 servings of fruits/vegetables daily and consuming healthier snacks

## 2021-02-07 DIAGNOSIS — I10 HYPERTENSION, ESSENTIAL, BENIGN: ICD-10-CM

## 2021-02-07 RX ORDER — METOPROLOL TARTRATE 100 MG/1
TABLET ORAL
Qty: 60 TABLET | Refills: 5 | Status: SHIPPED | OUTPATIENT
Start: 2021-02-07 | End: 2021-09-07

## 2021-02-18 DIAGNOSIS — E08.65 DIABETES MELLITUS DUE TO UNDERLYING CONDITION, UNCONTROLLED, WITH HYPERGLYCEMIA (HCC): ICD-10-CM

## 2021-02-18 RX ORDER — METFORMIN HYDROCHLORIDE 500 MG/1
2000 TABLET, EXTENDED RELEASE ORAL DAILY
Qty: 120 TABLET | Refills: 5 | Status: SHIPPED | OUTPATIENT
Start: 2021-02-18 | End: 2021-10-11

## 2021-02-18 RX ORDER — EMPAGLIFLOZIN 10 MG/1
TABLET, FILM COATED ORAL
Qty: 30 TABLET | Refills: 5 | Status: SHIPPED | OUTPATIENT
Start: 2021-02-18 | End: 2021-09-16

## 2021-03-03 ENCOUNTER — TELEPHONE (OUTPATIENT)
Dept: INTERNAL MEDICINE CLINIC | Facility: CLINIC | Age: 66
End: 2021-03-03

## 2021-03-03 NOTE — TELEPHONE ENCOUNTER
Patient said he never received his cologuard kit  He said he called the company and they told him it was delivered via [employer] on 2/10 but he never got it  He's not sure if they delivered it to the wrong address or what happened  They are sending him a second kit in the mail  He wanted to let you know in case the insurance company questions why he was getting another one

## 2021-03-04 DIAGNOSIS — E78.5 DYSLIPIDEMIA: ICD-10-CM

## 2021-03-04 DIAGNOSIS — E11.9 CONTROLLED TYPE 2 DIABETES MELLITUS WITHOUT COMPLICATION, WITHOUT LONG-TERM CURRENT USE OF INSULIN (HCC): ICD-10-CM

## 2021-03-04 RX ORDER — ATORVASTATIN CALCIUM 20 MG/1
TABLET, FILM COATED ORAL
Qty: 30 TABLET | Refills: 11 | Status: SHIPPED | OUTPATIENT
Start: 2021-03-04 | End: 2021-06-22

## 2021-03-23 DIAGNOSIS — R19.5 POSITIVE COLORECTAL CANCER SCREENING USING COLOGUARD TEST: Primary | ICD-10-CM

## 2021-04-15 DIAGNOSIS — E08.65 DIABETES MELLITUS DUE TO UNDERLYING CONDITION, UNCONTROLLED, WITH HYPERGLYCEMIA (HCC): ICD-10-CM

## 2021-04-16 RX ORDER — INSULIN GLARGINE 100 [IU]/ML
35 INJECTION, SOLUTION SUBCUTANEOUS DAILY
Qty: 15 ML | Refills: 1 | Status: SHIPPED | OUTPATIENT
Start: 2021-04-16 | End: 2021-06-23

## 2021-05-25 ENCOUNTER — RA CDI HCC (OUTPATIENT)
Dept: OTHER | Facility: HOSPITAL | Age: 66
End: 2021-05-25

## 2021-05-25 NOTE — PROGRESS NOTES
[facility]  coding opportunities             Chart reviewed, (number of) suggestions sent to provider: 1     Problem listed updated  Provider Accepted, (number of) suggestions accepted: 1        Patients insurance company: [employer] (Medicare Advantage and Commercial)     Visit status: Patient canceled the appointment        [facility]  coding opportunities             Chart reviewed, (number of) suggestions sent to provider: 1     Problem listed updated   Provider Accepted, (number of) suggestions accepted: 1        Patients insurance company: [employer] (Medicare Advantage and Commercial)           [facility]  coding opportunities             Chart reviewed, (number of) suggestions sent to provider: 1           Patients insurance company: [employer] [address] [place] [address] (Medicare Advantage and Commercial)           Not found on active problem list- please refer to medication list    Z79 4 Long term (current) use of insulin ([place] [id number] )

## 2021-05-26 PROBLEM — Z79.4 INSULIN LONG-TERM USE (HCC): Status: ACTIVE | Noted: 2021-05-26

## 2021-06-08 ENCOUNTER — OFFICE VISIT (OUTPATIENT)
Dept: GASTROENTEROLOGY | Facility: AMBULARY SURGERY CENTER | Age: 66
End: 2021-06-08
Payer: COMMERCIAL

## 2021-06-08 VITALS
BODY MASS INDEX: 28.95 KG/M2 | HEIGHT: 68 IN | WEIGHT: 191 LBS | DIASTOLIC BLOOD PRESSURE: 80 MMHG | SYSTOLIC BLOOD PRESSURE: 144 MMHG

## 2021-06-08 DIAGNOSIS — R10.31 RIGHT LOWER QUADRANT ABDOMINAL PAIN: ICD-10-CM

## 2021-06-08 DIAGNOSIS — R19.5 POSITIVE COLORECTAL CANCER SCREENING USING COLOGUARD TEST: Primary | ICD-10-CM

## 2021-06-08 PROCEDURE — 3077F SYST BP >= 140 MM HG: CPT | Performed by: INTERNAL MEDICINE

## 2021-06-08 PROCEDURE — 3008F BODY MASS INDEX DOCD: CPT | Performed by: INTERNAL MEDICINE

## 2021-06-08 PROCEDURE — 99244 OFF/OP CNSLTJ NEW/EST MOD 40: CPT | Performed by: INTERNAL MEDICINE

## 2021-06-08 PROCEDURE — 3079F DIAST BP 80-89 MM HG: CPT | Performed by: INTERNAL MEDICINE

## 2021-06-08 PROCEDURE — 4004F PT TOBACCO SCREEN RCVD TLK: CPT | Performed by: INTERNAL MEDICINE

## 2021-06-08 RX ORDER — SODIUM, POTASSIUM,MAG SULFATES 17.5-3.13G
2 SOLUTION, RECONSTITUTED, ORAL ORAL SEE ADMIN INSTRUCTIONS
Qty: 177 ML | Refills: 0 | Status: SHIPPED | OUTPATIENT
Start: 2021-06-08 | End: 2021-10-14 | Stop reason: ALTCHOICE

## 2021-06-08 NOTE — ASSESSMENT & PLAN NOTE
Probable false-positive test   Rule out colorectal lesions including polyps or malignancy       -Schedule for colonoscopy  -High-fiber diet     -Patient was given instructions about the colonoscopy prep     -Patient was explained about  the risks and benefits of the procedure  Risks including but not limited to bleeding, infection, perforation were explained in detail  Also explained about less than 100% sensitivity with the exam and other alternatives

## 2021-06-08 NOTE — ASSESSMENT & PLAN NOTE
Patient reports having nonspecific pain in the right side of the abdomen more in the paraumbilical area and it lasts for couple of hours      -  Schedule for CT scan the abdomen pelvis

## 2021-06-08 NOTE — PROGRESS NOTES
Consultation - [facility]) [facility]  Azeb Chiutaj 1955 male         Chief Complaint:    Positive cologuard    HPI:   78-year-old male with history of diabetes mellitus, hypertension, hyperlipidemia, anxiety was referred because of positive cologuard stool testing  Patient had colonoscopy about 10 years ago  Patient has regular bowel movements and denies any blood or mucus in the stool  Appetite is good and denies any recent weight loss  Complains about episodes of right-sided abdominal pain lasting for couple of hours  Denies any specific relationship with meal  Denies any nausea, or vomiting  Has no heartburn or acid reflux  Denies any difficulty swallowing  REVIEW OF SYSTEMS: Review of Systems   Constitutional: Negative for activity change, appetite change, chills, diaphoresis, fatigue, fever and unexpected weight change  HENT: Negative for ear discharge, ear pain, facial swelling, hearing loss, nosebleeds, sore throat, tinnitus and voice change  Eyes: Negative for pain, discharge, redness, itching and visual disturbance  Respiratory: Negative for apnea, cough, chest tightness, shortness of breath and wheezing  Cardiovascular: Negative for chest pain and palpitations  Gastrointestinal:        As noted in HPI   Endocrine: Negative for cold intolerance, heat intolerance and polyuria  Genitourinary: Negative for difficulty urinating, dysuria, flank pain, hematuria and urgency  Musculoskeletal: Negative for arthralgias, back pain, gait problem, joint swelling and myalgias  Skin: Negative for rash and wound  Neurological: Negative for dizziness, tremors, seizures, speech difficulty, light-headedness, numbness and headaches  Hematological: Negative for adenopathy  Does not bruise/bleed easily  Psychiatric/Behavioral: Negative for agitation, behavioral problems and confusion  The patient is not nervous/anxious           Past Medical History:   Diagnosis Date    Abnormal liver function test     LAST ASSESSED: [id number]    Athlete's foot     LAST ASSESSED: [id number]    Callus     LAST ASSESSED: [id number]    Carpal tunnel syndrome, unspecified upper limb     LAST ASSESSED: 02OCT2012    Chronic pain syndrome     LAST ASSESSED: 22OCT2013    Colon polyp     Herpes zoster ophthalmicus of right eye     LAST ASSESSED: [id number]    Hyperglycemia     LAST ASSESSED: [id number]    Hyperplastic colon polyp     Pericardial effusion     LAST ASSESSED: [id number]      Past Surgical History:   Procedure Laterality Date    COLONOSCOPY      HEMORROIDECTOMY      LUMBAR LAMINECTOMY      LAST ASSESSED: [id number]    TONSILLECTOMY AND ADENOIDECTOMY      WISDOM TOOTH EXTRACTION       Social History     Socioeconomic History    Marital status: Single     Spouse name: Not on file    Number of children: Not on file    Years of education: Not on file    Highest education level: Not on file   Occupational History    Not on file   Social Needs    Financial resource strain: Not on file    Food insecurity     Worry: Not on file     Inability: Not on file    Transportation needs     Medical: Not on file     Non-medical: Not on file   Tobacco Use    Smoking status: Current Every Day Smoker     Packs/day: 0 50    Smokeless tobacco: Never Used   Substance and Sexual Activity    Alcohol use: No    Drug use: No    Sexual activity: Not on file   Lifestyle    Physical activity     Days per week: Not on file     Minutes per session: Not on file    Stress: Not on file   Relationships    Social connections     Talks on phone: Not on file     Gets together: Not on file     Attends Sabianism service: Not on file     Active member of club or organization: Not on file     Attends meetings of clubs or organizations: Not on file     Relationship status: Not on file    Intimate partner violence     Fear of current or ex partner: Not on file     Emotionally abused: Not on file     Physically abused: Not on file     Forced sexual activity: Not on file   Other Topics Concern    Not on file   Social History Narrative    Not on file     Family History   Problem Relation Age of Onset    No Known Problems Father     Hypotension Family     Prostate cancer Family     Coronary artery disease Family     Diabetes Family     Diverticulitis Mother     Breast cancer Mother      Duloxetine hcl, Lisinopril, and Olmesartan medoxomil-hctz  Current Outpatient Medications   Medication Sig Dispense Refill    aspirin 81 mg chewable tablet Chew 1 tablet daily      atorvastatin (LIPITOR) 20 mg tablet TAKE 1 TABLET BY MOUTH EVERY DAY 30 tablet 11    escitalopram (LEXAPRO) 20 mg tablet TAKE 1 TABLET BY MOUTH EVERY DAY 30 tablet 5    Jardiance 10 MG TABS TAKE 1 TABLET (10 MG TOTAL) BY MOUTH EVERY MORNING FOR DIABETES 30 tablet 5    Lantus SoloStar 100 units/mL injection pen INJECT 35 UNITS UNDER THE SKIN DAILY FOR DIABETES 15 mL 1    metFORMIN (GLUCOPHAGE-XR) 500 mg 24 hr tablet TAKE 4 TABLETS (2,000 MG TOTAL) BY MOUTH DAILY FOR DIABETES 120 tablet 5    metoprolol tartrate (LOPRESSOR) 100 mg tablet TAKE 1 TABLET BY MOUTH TWICE A DAY 60 tablet 5    B-D UF III MINI PEN NEEDLES 31G X 5 MM MISC BY DOES NOT APPLY ROUTE DAILY TO INJECT INSULIN (Patient not taking: Reported on 6/8/2021) 100 each 1    glucose blood test strip TEST ONCE DAILY (Patient not taking: Reported on 6/8/2021) 50 each 3    Lancets (OneTouch Delica Plus [id number]) MISC TEST DAILY (Patient not taking: Reported on 6/8/2021) 100 each 1    Na Sulfate-K Sulfate-Mg Sulf (Suprep Bowel Prep Kit) 17 5-3 13-1 6 GM/177ML SOLN Take 2 Bottles by mouth see administration instructions Please follow the instructions from the office 177 mL 0     No current facility-administered medications for this visit  Blood pressure 144/80, height 5' 8" (1 727 m), weight 86 6 kg (191 lb)      PHYSICAL EXAM: Physical Exam  Constitutional:       Appearance: He is well-developed  HENT:      Head: Normocephalic and atraumatic  Eyes:      General: No scleral icterus  Right eye: No discharge  Left eye: No discharge  Conjunctiva/sclera: Conjunctivae normal       Pupils: Pupils are equal, round, and reactive to light  Neck:      Musculoskeletal: Neck supple  Thyroid: No thyromegaly  Vascular: No JVD  Trachea: No tracheal deviation  Cardiovascular:      Rate and Rhythm: Normal rate and regular rhythm  Heart sounds: Normal heart sounds  No murmur  No friction rub  No gallop  Pulmonary:      Effort: Pulmonary effort is normal  No respiratory distress  Breath sounds: Normal breath sounds  No wheezing or rales  Chest:      Chest wall: No tenderness  Abdominal:      General: Bowel sounds are normal  There is no distension  Palpations: Abdomen is soft  There is no mass  Tenderness: There is no abdominal tenderness  There is no guarding or rebound  Hernia: No hernia is present  Lymphadenopathy:      Cervical: No cervical adenopathy  Skin:     General: Skin is warm and dry  Findings: No erythema or rash  Neurological:      Mental Status: He is alert and oriented to person, place, and time  Psychiatric:         Behavior: Behavior normal          Thought Content: Thought content normal           Lab Results   Component Value Date    WBC 6 96 01/22/2021    HGB 14 3 01/22/2021    HCT 45 1 01/22/2021    MCV 99 (H) 01/22/2021     (L) 01/22/2021     Lab Results   Component Value Date    GLUCOSE 135 04/20/2015    CALCIUM 9 9 01/22/2021     04/20/2015    K 4 5 01/22/2021    CO2 29 01/22/2021     01/22/2021    BUN 21 01/22/2021    CREATININE 0 87 01/22/2021     Lab Results   Component Value Date    ALT 24 01/22/2021    AST 14 01/22/2021     (H) 01/22/2021    ALKPHOS 251 (H) 01/22/2021    BILITOT 0 55 04/20/2015     No results found for: INR, PROTIME    No results found      ASSESSMENT & PLAN:    Positive colorectal cancer screening using Cologuard test    Probable false-positive test   Rule out colorectal lesions including polyps or malignancy       -Schedule for colonoscopy  -High-fiber diet     -Patient was given instructions about the colonoscopy prep     -Patient was explained about  the risks and benefits of the procedure  Risks including but not limited to bleeding, infection, perforation were explained in detail  Also explained about less than 100% sensitivity with the exam and other alternatives  Right lower quadrant abdominal pain    Patient reports having nonspecific pain in the right side of the abdomen more in the paraumbilical area and it lasts for couple of hours      -  Schedule for CT scan the abdomen pelvis

## 2021-06-11 ENCOUNTER — APPOINTMENT (OUTPATIENT)
Dept: LAB | Facility: CLINIC | Age: 66
End: 2021-06-11
Payer: COMMERCIAL

## 2021-06-11 DIAGNOSIS — Z12.5 SCREENING PSA (PROSTATE SPECIFIC ANTIGEN): ICD-10-CM

## 2021-06-11 DIAGNOSIS — E55.9 VITAMIN D DEFICIENCY: ICD-10-CM

## 2021-06-11 DIAGNOSIS — Z11.4 ENCOUNTER FOR SCREENING FOR HIV: ICD-10-CM

## 2021-06-11 DIAGNOSIS — Z12.11 SCREEN FOR COLON CANCER: ICD-10-CM

## 2021-06-11 DIAGNOSIS — E08.65 DIABETES MELLITUS DUE TO UNDERLYING CONDITION, UNCONTROLLED, WITH HYPERGLYCEMIA (HCC): ICD-10-CM

## 2021-06-11 DIAGNOSIS — E78.5 DYSLIPIDEMIA: ICD-10-CM

## 2021-06-11 DIAGNOSIS — R74.8 ELEVATED ALKALINE PHOSPHATASE LEVEL: ICD-10-CM

## 2021-06-11 DIAGNOSIS — R10.31 RIGHT LOWER QUADRANT ABDOMINAL PAIN: ICD-10-CM

## 2021-06-11 DIAGNOSIS — I10 BENIGN ESSENTIAL HYPERTENSION: ICD-10-CM

## 2021-06-11 LAB
25(OH)D3 SERPL-MCNC: 25.2 NG/ML (ref 30–100)
ALBUMIN SERPL BCP-MCNC: 3.8 G/DL (ref 3.5–5)
ALP SERPL-CCNC: 267 U/L (ref 46–116)
ALT SERPL W P-5'-P-CCNC: 25 U/L (ref 12–78)
ANION GAP SERPL CALCULATED.3IONS-SCNC: 6 MMOL/L (ref 4–13)
AST SERPL W P-5'-P-CCNC: 22 U/L (ref 5–45)
BACTERIA UR QL AUTO: NORMAL /HPF
BASOPHILS # BLD AUTO: 0.06 THOUSANDS/ΜL (ref 0–0.1)
BASOPHILS NFR BLD AUTO: 1 % (ref 0–1)
BILIRUB SERPL-MCNC: 1.03 MG/DL (ref 0.2–1)
BILIRUB UR QL STRIP: NEGATIVE
BUN SERPL-MCNC: 17 MG/DL (ref 5–25)
CALCIUM SERPL-MCNC: 9.4 MG/DL (ref 8.3–10.1)
CHLORIDE SERPL-SCNC: 105 MMOL/L (ref 100–108)
CLARITY UR: CLEAR
CO2 SERPL-SCNC: 26 MMOL/L (ref 21–32)
COLOR UR: ABNORMAL
CREAT SERPL-MCNC: 0.87 MG/DL (ref 0.6–1.3)
CREAT UR-MCNC: 91.2 MG/DL
EOSINOPHIL # BLD AUTO: 0.18 THOUSAND/ΜL (ref 0–0.61)
EOSINOPHIL NFR BLD AUTO: 3 % (ref 0–6)
ERYTHROCYTE [DISTWIDTH] IN BLOOD BY AUTOMATED COUNT: 15.2 % (ref 11.6–15.1)
EST. AVERAGE GLUCOSE BLD GHB EST-MCNC: 140 MG/DL
GFR SERPL CREATININE-BSD FRML MDRD: 91 ML/MIN/1.73SQ M
GLUCOSE P FAST SERPL-MCNC: 92 MG/DL (ref 65–99)
GLUCOSE UR STRIP-MCNC: ABNORMAL MG/DL
HBA1C MFR BLD: 6.5 %
HCT VFR BLD AUTO: 44.9 % (ref 36.5–49.3)
HGB BLD-MCNC: 14.3 G/DL (ref 12–17)
HGB UR QL STRIP.AUTO: NEGATIVE
IMM GRANULOCYTES # BLD AUTO: 0.04 THOUSAND/UL (ref 0–0.2)
IMM GRANULOCYTES NFR BLD AUTO: 1 % (ref 0–2)
KETONES UR STRIP-MCNC: NEGATIVE MG/DL
LEUKOCYTE ESTERASE UR QL STRIP: ABNORMAL
LYMPHOCYTES # BLD AUTO: 1.8 THOUSANDS/ΜL (ref 0.6–4.47)
LYMPHOCYTES NFR BLD AUTO: 25 % (ref 14–44)
MCH RBC QN AUTO: 32.2 PG (ref 26.8–34.3)
MCHC RBC AUTO-ENTMCNC: 31.8 G/DL (ref 31.4–37.4)
MCV RBC AUTO: 101 FL (ref 82–98)
MICROALBUMIN UR-MCNC: 9.7 MG/L (ref 0–20)
MICROALBUMIN/CREAT 24H UR: 11 MG/G CREATININE (ref 0–30)
MONOCYTES # BLD AUTO: 0.63 THOUSAND/ΜL (ref 0.17–1.22)
MONOCYTES NFR BLD AUTO: 9 % (ref 4–12)
NEUTROPHILS # BLD AUTO: 4.55 THOUSANDS/ΜL (ref 1.85–7.62)
NEUTS SEG NFR BLD AUTO: 61 % (ref 43–75)
NITRITE UR QL STRIP: NEGATIVE
NON-SQ EPI CELLS URNS QL MICRO: NORMAL /HPF
NRBC BLD AUTO-RTO: 0 /100 WBCS
PH UR STRIP.AUTO: 6 [PH]
PLATELET # BLD AUTO: 97 THOUSANDS/UL (ref 149–390)
PMV BLD AUTO: 12.1 FL (ref 8.9–12.7)
POTASSIUM SERPL-SCNC: 4.6 MMOL/L (ref 3.5–5.3)
PROT SERPL-MCNC: 8.2 G/DL (ref 6.4–8.2)
PROT UR STRIP-MCNC: NEGATIVE MG/DL
PSA SERPL-MCNC: 0.2 NG/ML (ref 0–4)
RBC # BLD AUTO: 4.44 MILLION/UL (ref 3.88–5.62)
RBC #/AREA URNS AUTO: NORMAL /HPF
SODIUM SERPL-SCNC: 137 MMOL/L (ref 136–145)
SP GR UR STRIP.AUTO: 1.02 (ref 1–1.03)
UROBILINOGEN UR QL STRIP.AUTO: 1 E.U./DL
WBC # BLD AUTO: 7.26 THOUSAND/UL (ref 4.31–10.16)
WBC #/AREA URNS AUTO: NORMAL /HPF

## 2021-06-11 PROCEDURE — 82043 UR ALBUMIN QUANTITATIVE: CPT

## 2021-06-11 PROCEDURE — 3061F NEG MICROALBUMINURIA REV: CPT | Performed by: INTERNAL MEDICINE

## 2021-06-11 PROCEDURE — 3044F HG A1C LEVEL LT 7.0%: CPT | Performed by: INTERNAL MEDICINE

## 2021-06-11 PROCEDURE — 81001 URINALYSIS AUTO W/SCOPE: CPT

## 2021-06-11 PROCEDURE — 87389 HIV-1 AG W/HIV-1&-2 AB AG IA: CPT

## 2021-06-11 PROCEDURE — 82570 ASSAY OF URINE CREATININE: CPT

## 2021-06-11 PROCEDURE — 82306 VITAMIN D 25 HYDROXY: CPT

## 2021-06-11 PROCEDURE — 83036 HEMOGLOBIN GLYCOSYLATED A1C: CPT

## 2021-06-11 PROCEDURE — 85025 COMPLETE CBC W/AUTO DIFF WBC: CPT

## 2021-06-11 PROCEDURE — G0103 PSA SCREENING: HCPCS

## 2021-06-11 PROCEDURE — 80053 COMPREHEN METABOLIC PANEL: CPT

## 2021-06-11 PROCEDURE — 36415 COLL VENOUS BLD VENIPUNCTURE: CPT

## 2021-06-13 LAB — HIV 1+2 AB+HIV1 P24 AG SERPL QL IA: NORMAL

## 2021-06-16 ENCOUNTER — RA CDI HCC (OUTPATIENT)
Dept: OTHER | Facility: HOSPITAL | Age: 66
End: 2021-06-16

## 2021-06-16 NOTE — PROGRESS NOTES
[facility] 75  coding opportunities             Chart reviewed, (number of) suggestions sent to provider: 1     Problem listed updated  Provider Accepted, (number of) suggestions accepted: 1         Number of suggestions actually used: 1      Number of suggestions NOT actually used: 0     Patients insurance company: [employer] (Medicare Advantage and Commercial)     Visit status: Patient arrived for their scheduled appointment        [facility] 75  coding opportunities             Chart reviewed, (number of) suggestions sent to provider: 1     Problem listed updated   Provider Accepted, (number of) suggestions accepted: 1               Patients insurance company: [employer] (Medicare Advantage and Commercial)           [facility] 75  coding opportunities             Chart reviewed, (number of) suggestions sent to provider: 1                  Patients insurance company: [employer] [address] [place] [address] (Medicare Advantage and Commercial)           Not found on active problem list- please refer to medication list    Z79 4 Long term (current) use of insulin ([place] [phone] )

## 2021-06-17 ENCOUNTER — HOSPITAL ENCOUNTER (OUTPATIENT)
Dept: RADIOLOGY | Facility: HOSPITAL | Age: 66
Discharge: HOME | End: 2021-06-17
Attending: INTERNAL MEDICINE
Payer: COMMERCIAL

## 2021-06-17 DIAGNOSIS — R10.31 RIGHT LOWER QUADRANT ABDOMINAL PAIN: ICD-10-CM

## 2021-06-17 PROCEDURE — G1004 CDSM NDSC: HCPCS

## 2021-06-17 PROCEDURE — 74177 CT ABD & PELVIS W/CONTRAST: CPT

## 2021-06-17 RX ADMIN — IOHEXOL 100 ML: 350 INJECTION, SOLUTION INTRAVENOUS at 07:26

## 2021-06-18 ENCOUNTER — TELEPHONE (OUTPATIENT)
Dept: GASTROENTEROLOGY | Facility: AMBULARY SURGERY CENTER | Age: 66
End: 2021-06-18

## 2021-06-18 NOTE — TELEPHONE ENCOUNTER
Pt returned call  EGD has been added to colonoscopy w/ dr Weir Police   Pt had to be rescheduled from 8/27/2021 ASC to 9/13/2021 [facility] GI LAB (due to esophageal varices)

## 2021-06-22 ENCOUNTER — OFFICE VISIT (OUTPATIENT)
Dept: INTERNAL MEDICINE CLINIC | Facility: CLINIC | Age: 66
End: 2021-06-22
Payer: COMMERCIAL

## 2021-06-22 VITALS
HEART RATE: 79 BPM | BODY MASS INDEX: 28.95 KG/M2 | TEMPERATURE: 97.1 F | WEIGHT: 191 LBS | DIASTOLIC BLOOD PRESSURE: 70 MMHG | HEIGHT: 68 IN | SYSTOLIC BLOOD PRESSURE: 134 MMHG | OXYGEN SATURATION: 99 %

## 2021-06-22 DIAGNOSIS — F41.1 GENERALIZED ANXIETY DISORDER: ICD-10-CM

## 2021-06-22 DIAGNOSIS — K74.60 CIRRHOSIS OF LIVER WITH ASCITES, UNSPECIFIED HEPATIC CIRRHOSIS TYPE (HCC): Primary | ICD-10-CM

## 2021-06-22 DIAGNOSIS — Z79.4 CONTROLLED TYPE 2 DIABETES MELLITUS WITHOUT COMPLICATION, WITH LONG-TERM CURRENT USE OF INSULIN (HCC): ICD-10-CM

## 2021-06-22 DIAGNOSIS — E55.9 VITAMIN D DEFICIENCY: ICD-10-CM

## 2021-06-22 DIAGNOSIS — Z79.4 INSULIN LONG-TERM USE (HCC): ICD-10-CM

## 2021-06-22 DIAGNOSIS — E78.5 DYSLIPIDEMIA: ICD-10-CM

## 2021-06-22 DIAGNOSIS — I10 BENIGN ESSENTIAL HYPERTENSION: ICD-10-CM

## 2021-06-22 DIAGNOSIS — E11.9 CONTROLLED TYPE 2 DIABETES MELLITUS WITHOUT COMPLICATION, WITH LONG-TERM CURRENT USE OF INSULIN (HCC): ICD-10-CM

## 2021-06-22 DIAGNOSIS — N20.0 RIGHT RENAL STONE: ICD-10-CM

## 2021-06-22 DIAGNOSIS — R18.8 CIRRHOSIS OF LIVER WITH ASCITES, UNSPECIFIED HEPATIC CIRRHOSIS TYPE (HCC): Primary | ICD-10-CM

## 2021-06-22 DIAGNOSIS — K80.20 GALL STONES: ICD-10-CM

## 2021-06-22 PROBLEM — R19.5 POSITIVE COLORECTAL CANCER SCREENING USING COLOGUARD TEST: Status: RESOLVED | Noted: 2021-06-08 | Resolved: 2021-06-22

## 2021-06-22 PROCEDURE — 4004F PT TOBACCO SCREEN RCVD TLK: CPT | Performed by: INTERNAL MEDICINE

## 2021-06-22 PROCEDURE — 3075F SYST BP GE 130 - 139MM HG: CPT | Performed by: INTERNAL MEDICINE

## 2021-06-22 PROCEDURE — 3078F DIAST BP <80 MM HG: CPT | Performed by: INTERNAL MEDICINE

## 2021-06-22 PROCEDURE — 3725F SCREEN DEPRESSION PERFORMED: CPT | Performed by: INTERNAL MEDICINE

## 2021-06-22 PROCEDURE — 99214 OFFICE O/P EST MOD 30 MIN: CPT | Performed by: INTERNAL MEDICINE

## 2021-06-22 PROCEDURE — 3008F BODY MASS INDEX DOCD: CPT | Performed by: INTERNAL MEDICINE

## 2021-06-22 RX ORDER — ATORVASTATIN CALCIUM 20 MG/1
20 TABLET, FILM COATED ORAL EVERY OTHER DAY
Qty: 30 TABLET | Refills: 11
Start: 2021-06-22 | End: 2022-03-28

## 2021-06-22 NOTE — PROGRESS NOTES
Assessment/Plan:    Cirrhosis of liver with ascites ([facility] 75 )  Unclear etiology  Labs ordered by GI      Gall stones  I do not believe this is causing symptoms  THe right sided abd pain he has is in the mid to lower part of the abdomen, unrelated to food  It seems more muscular so we will continue to observe  Controlled type 2 diabetes mellitus without complication, with long-term current use of insulin (HCC)  Continue Jardiance metformin and Lantus  He did not tolerate ACE/ARB  Lab Results   Component Value Date    HGBA1C 6 5 (H) 06/11/2021       Benign essential hypertension  Continue metoprolol    Right renal stone  Stay well hydrated  Symptoms discussed    Dyslipidemia  Controlled on atorvastatin which he takes every other day    Generalized anxiety disorder  Stable on lexapro    Insulin long-term use ([facility] 75 )  DM controlled    Vitamin D deficiency  Start 1000 IU daily    Problem List Items Addressed This Visit        Digestive    Gall stones     I do not believe this is causing symptoms  THe right sided abd pain he has is in the mid to lower part of the abdomen, unrelated to food  It seems more muscular so we will continue to observe            Cirrhosis of liver with ascites ([facility] ) - Primary     Unclear etiology  Labs ordered by GI              Endocrine    Controlled type 2 diabetes mellitus without complication, with long-term current use of insulin (HCC)     Continue Jardiance metformin and Lantus  He did not tolerate ACE/ARB  Lab Results   Component Value Date    HGBA1C 6 5 (H) 06/11/2021            Relevant Medications    atorvastatin (LIPITOR) 20 mg tablet    Other Relevant Orders    HEMOGLOBIN A1C W/ EAG ESTIMATION    Lipid Panel with Direct LDL reflex       Cardiovascular and Mediastinum    Benign essential hypertension     Continue metoprolol            Genitourinary    Right renal stone     Stay well hydrated  Symptoms discussed            Other    Vitamin D deficiency     Start 1000 IU daily Dyslipidemia     Controlled on atorvastatin which he takes every other day         Relevant Medications    atorvastatin (LIPITOR) 20 mg tablet    Other Relevant Orders    Lipid Panel with Direct LDL reflex    Generalized anxiety disorder     Stable on lexapro         Insulin long-term use ([address] )     DM controlled                 Subjective:      Patient ID: Bessy North is a 60 y o  male  HPI  Here for a follow up  Right sided abd pain and saw GI  CT showed fatty liver, liver cirrhosis ascites gall stones 2mm right renal stone  He experiences mild right sided mid to lower abd pain, unrelated to meals  Scheduled for EGD and colonoscopy  Recent labs reviewed and A1C 6 5 from 7 1 on Lantus  Jardiance and metformin  Denies hypoglycemic spells  Elev ALP, D is 25, plt count 97    The following portions of the patient's history were reviewed and updated as appropriate: allergies, current medications, past family history, past medical history, past social history, past surgical history and problem list     Review of Systems   Constitutional: Negative for chills, fever and unexpected weight change  HENT: Positive for rhinorrhea and sneezing  Likely from his cat   Respiratory: Negative for cough, chest tightness, shortness of breath and wheezing  Cardiovascular: Negative for chest pain, palpitations and leg swelling  Gastrointestinal: Positive for abdominal pain (right sided that is not tender to touch)  Negative for blood in stool, constipation and diarrhea  Genitourinary: Negative for difficulty urinating  Neurological: Negative for dizziness and headaches  Psychiatric/Behavioral: Negative for dysphoric mood  Objective:      /70   Pulse 79   Temp (!) 97 1 °F (36 2 °C) (Temporal)   Ht 5' 8" (1 727 m)   Wt 86 6 kg (191 lb)   SpO2 99%   BMI 29 04 kg/m²          Physical Exam  Constitutional:       General: He is not in acute distress  Appearance: He is well-developed   He is not ill-appearing, toxic-appearing or diaphoretic  HENT:      Head: Normocephalic and atraumatic  Eyes:      Conjunctiva/sclera: Conjunctivae normal    Cardiovascular:      Rate and Rhythm: Normal rate and regular rhythm  Pulses: no weak pulses          Dorsalis pedis pulses are 2+ on the right side and 2+ on the left side  Heart sounds: Normal heart sounds  No murmur heard  Pulmonary:      Effort: Pulmonary effort is normal  No respiratory distress  Breath sounds: Normal breath sounds  No wheezing or rales  Abdominal:      General: There is no distension  Palpations: Abdomen is soft  There is no mass  Tenderness: There is no abdominal tenderness  There is no guarding or rebound  Musculoskeletal:      Cervical back: Neck supple  Right lower leg: No edema  Left lower leg: No edema  Feet:      Right foot:      Skin integrity: Dry skin present  Left foot:      Skin integrity: Dry skin present  Skin:     General: Skin is warm and dry  Neurological:      Mental Status: He is alert and oriented to person, place, and time  Psychiatric:         Mood and Affect: Mood normal          Behavior: Behavior normal          Thought Content: Thought content normal          Judgment: Judgment normal        Right Foot/Ankle   Right Foot Inspection  Skin Exam: dry skin                          Toe Exam: ROM and strength within normal limits  Sensory       Monofilament testing: intact  Vascular    The right DP pulse is 2+  Left Foot/Ankle  Left Foot Inspection  Skin Exam: dry skin                         Toe Exam: ROM and strength within normal limits                   Sensory       Monofilament: intact  Vascular    The left DP pulse is 2+  Assign Risk Category:  No deformity present; No loss of protective sensation;  No weak pulses       Risk: 0  Thickened toenails

## 2021-06-22 NOTE — ASSESSMENT & PLAN NOTE
Continue Jardiance metformin and Lantus  He did not tolerate ACE/ARB  Lab Results   Component Value Date    HGBA1C 6 5 (H) 06/11/2021

## 2021-06-22 NOTE — ASSESSMENT & PLAN NOTE
I do not believe this is causing symptoms  THe right sided abd pain he has is in the mid to lower part of the abdomen, unrelated to food  It seems more muscular so we will continue to observe

## 2021-06-23 DIAGNOSIS — E08.65 DIABETES MELLITUS DUE TO UNDERLYING CONDITION, UNCONTROLLED, WITH HYPERGLYCEMIA (HCC): ICD-10-CM

## 2021-06-23 RX ORDER — INSULIN GLARGINE 100 [IU]/ML
35 INJECTION, SOLUTION SUBCUTANEOUS DAILY
Qty: 15 ML | Refills: 1 | Status: SHIPPED | OUTPATIENT
Start: 2021-06-23 | End: 2021-06-25

## 2021-07-03 DIAGNOSIS — E08.65 DIABETES MELLITUS DUE TO UNDERLYING CONDITION, UNCONTROLLED, WITH HYPERGLYCEMIA (HCC): ICD-10-CM

## 2021-07-06 RX ORDER — PEN NEEDLE, DIABETIC 31 GX5/16"
NEEDLE, DISPOSABLE MISCELLANEOUS
Qty: 100 EACH | Refills: 6 | Status: SHIPPED | OUTPATIENT
Start: 2021-07-06 | End: 2022-07-08

## 2021-07-08 ENCOUNTER — TELEPHONE (OUTPATIENT)
Dept: INTERNAL MEDICINE CLINIC | Facility: CLINIC | Age: 66
End: 2021-07-08

## 2021-07-08 NOTE — TELEPHONE ENCOUNTER
Patient called back with his new insurance information  The ID number is [id number]  This insurance does not become effective until 8/1/21

## 2021-07-08 NOTE — TELEPHONE ENCOUNTER
Pt called to update Insurance info to [facility] [employer] PPO - I was not able to locate coverage and he could not find the address for the insurance at this time

## 2021-07-10 DIAGNOSIS — E08.65 DIABETES MELLITUS DUE TO UNDERLYING CONDITION, UNCONTROLLED, WITH HYPERGLYCEMIA (HCC): ICD-10-CM

## 2021-07-12 RX ORDER — LANCETS 33 GAUGE
EACH MISCELLANEOUS
Qty: 100 EACH | Refills: 1 | Status: SHIPPED | OUTPATIENT
Start: 2021-07-12

## 2021-09-05 DIAGNOSIS — I10 HYPERTENSION, ESSENTIAL, BENIGN: ICD-10-CM

## 2021-09-07 RX ORDER — METOPROLOL TARTRATE 100 MG/1
TABLET ORAL
Qty: 60 TABLET | Refills: 5 | Status: SHIPPED | OUTPATIENT
Start: 2021-09-07 | End: 2022-03-27

## 2021-09-10 ENCOUNTER — TELEPHONE (OUTPATIENT)
Dept: GASTROENTEROLOGY | Facility: HOSPITAL | Age: 66
End: 2021-09-10

## 2021-09-13 ENCOUNTER — ANESTHESIA (OUTPATIENT)
Dept: GASTROENTEROLOGY | Facility: HOSPITAL | Age: 66
End: 2021-09-13

## 2021-09-13 ENCOUNTER — HOSPITAL ENCOUNTER (OUTPATIENT)
Dept: GASTROENTEROLOGY | Facility: HOSPITAL | Age: 66
Setting detail: OUTPATIENT SURGERY
Discharge: HOME | End: 2021-09-13
Attending: INTERNAL MEDICINE
Payer: COMMERCIAL

## 2021-09-13 ENCOUNTER — ANESTHESIA EVENT (OUTPATIENT)
Dept: GASTROENTEROLOGY | Facility: HOSPITAL | Age: 66
End: 2021-09-13

## 2021-09-13 VITALS
BODY MASS INDEX: 28.82 KG/M2 | RESPIRATION RATE: 16 BRPM | WEIGHT: 190.2 LBS | HEIGHT: 68 IN | HEART RATE: 79 BPM | TEMPERATURE: 97.6 F | OXYGEN SATURATION: 97 % | SYSTOLIC BLOOD PRESSURE: 108 MMHG | DIASTOLIC BLOOD PRESSURE: 65 MMHG

## 2021-09-13 DIAGNOSIS — I85.00 ESOPHAGEAL VARICES WITHOUT BLEEDING, UNSPECIFIED ESOPHAGEAL VARICES TYPE (HCC): ICD-10-CM

## 2021-09-13 DIAGNOSIS — R19.5 POSITIVE COLORECTAL CANCER SCREENING USING COLOGUARD TEST: ICD-10-CM

## 2021-09-13 DIAGNOSIS — K29.00 ACUTE SUPERFICIAL GASTRITIS WITHOUT HEMORRHAGE: Primary | ICD-10-CM

## 2021-09-13 LAB — GLUCOSE SERPL-MCNC: 133 MG/DL (ref 65–140)

## 2021-09-13 PROCEDURE — 82948 REAGENT STRIP/BLOOD GLUCOSE: CPT

## 2021-09-13 PROCEDURE — 45378 DIAGNOSTIC COLONOSCOPY: CPT | Performed by: INTERNAL MEDICINE

## 2021-09-13 PROCEDURE — 43235 EGD DIAGNOSTIC BRUSH WASH: CPT | Performed by: INTERNAL MEDICINE

## 2021-09-13 RX ORDER — PROPOFOL 10 MG/ML
INJECTION, EMULSION INTRAVENOUS AS NEEDED
Status: DISCONTINUED | OUTPATIENT
Start: 2021-09-13 | End: 2021-09-13

## 2021-09-13 RX ORDER — LIDOCAINE HYDROCHLORIDE 10 MG/ML
INJECTION, SOLUTION EPIDURAL; INFILTRATION; INTRACAUDAL; PERINEURAL AS NEEDED
Status: DISCONTINUED | OUTPATIENT
Start: 2021-09-13 | End: 2021-09-13

## 2021-09-13 RX ORDER — FAMOTIDINE 20 MG/1
20 TABLET, FILM COATED ORAL 2 TIMES DAILY
Qty: 60 TABLET | Refills: 11 | Status: SHIPPED | OUTPATIENT
Start: 2021-09-13

## 2021-09-13 RX ORDER — SODIUM CHLORIDE, SODIUM LACTATE, POTASSIUM CHLORIDE, CALCIUM CHLORIDE 600; 310; 30; 20 MG/100ML; MG/100ML; MG/100ML; MG/100ML
INJECTION, SOLUTION INTRAVENOUS CONTINUOUS PRN
Status: DISCONTINUED | OUTPATIENT
Start: 2021-09-13 | End: 2021-09-13

## 2021-09-13 RX ORDER — PROPOFOL 10 MG/ML
INJECTION, EMULSION INTRAVENOUS CONTINUOUS PRN
Status: DISCONTINUED | OUTPATIENT
Start: 2021-09-13 | End: 2021-09-13

## 2021-09-13 RX ADMIN — PROPOFOL 100 MG: 10 INJECTION, EMULSION INTRAVENOUS at 08:09

## 2021-09-13 RX ADMIN — PROPOFOL 100 MCG/KG/MIN: 10 INJECTION, EMULSION INTRAVENOUS at 08:09

## 2021-09-13 RX ADMIN — SODIUM CHLORIDE, SODIUM LACTATE, POTASSIUM CHLORIDE, AND CALCIUM CHLORIDE: .6; .31; .03; .02 INJECTION, SOLUTION INTRAVENOUS at 08:03

## 2021-09-13 RX ADMIN — LIDOCAINE HYDROCHLORIDE 50 MG: 10 INJECTION, SOLUTION EPIDURAL; INFILTRATION; INTRACAUDAL at 08:09

## 2021-09-13 NOTE — ANESTHESIA POSTPROCEDURE EVALUATION
Post-Op Assessment Note    CV Status:  Stable    Pain management: adequate     Mental Status:  Alert and awake   Hydration Status:  Euvolemic   PONV Controlled:  Controlled   Airway Patency:  Patent      Post Op Vitals Reviewed: Yes      Staff: CRNA         No complications documented      BP  102/63   Temp     Pulse  68   Resp      SpO2   98

## 2021-09-13 NOTE — DISCHARGE INSTRUCTIONS
Esophageal Varices   WHAT YOU NEED TO KNOW:   Esophageal varices are swollen veins in the lower part of your esophagus  They are caused by increased pressure in the blood vessels of your liver  As the pressure builds in your liver, the pressure also builds in the veins in your esophagus  DISCHARGE INSTRUCTIONS:   Return to the emergency department if:   · You have severe abdominal pain  · You see blood in your vomit or bowel movements  · You have chest pain or you are short of breath  · You have trouble thinking clearly  Contact your healthcare provider if:   · You have a fever  · You have questions or concerns about your condition or care  Medicines:   · Medicines  may be given to decrease the pressure in your liver or to reduce stomach acid  · Take your medicine as directed  Contact your healthcare provider if you think your medicine is not helping or if you have side effects  Tell him of her if you are allergic to any medicine  Keep a list of the medicines, vitamins, and herbs you take  Include the amounts, and when and why you take them  Bring the list or the pill bottles to follow-up visits  Carry your medicine list with you in case of an emergency  Prevent your varices from bleeding:   · Do not drink alcohol  This will help prevent more damage to your esophagus and liver  Ask your healthcare provider for information if you need help to quit drinking  · Eat healthy foods  Healthy foods include fruits, vegetables, whole-grain breads, low-fat dairy products, beans, lean meats, and fish  Ask if you need to be on a special diet  You may need to eat foods that reduce stomach acid  Stomach acid can get into your esophagus and cause the varices to break open and bleed  · Limit sodium (salt)  You may need to decrease the amount of sodium you eat if you have swelling caused by fluid buildup  Fluid buildup can cause increased pressure in your veins   Sodium is found in table salt and salty foods such as canned foods, frozen foods, and potato chips  · Drink liquids as directed  Too much liquid can increase the pressure in your veins  Ask your healthcare provider how much liquid to drink each day and which liquids are best for you  Follow up with your healthcare provider as directed: You may need to return for more treatment  Write down your questions so you remember to ask them during your visits  © Copyright [employer] 2021 Information is for End User's use only and may not be sold, redistributed or otherwise used for commercial purposes  All illustrations and images included in CareNotes® are the copyrighted property of A D A M , Inc  or [facility] Kevin [employer]el   The above information is an  only  It is not intended as medical advice for individual conditions or treatments  Talk to your doctor, nurse or pharmacist before following any medical regimen to see if it is safe and effective for you

## 2021-09-13 NOTE — H&P
History and Physical - [facility]  Tamia Elliott 73 y o  male MRN: [id number]        HPI:  78-year-old male was noted to have positive cologuard on stool testing  Regular bowel movements  Patient had a CT scan done which showed cirrhosis and he was also added on for EGD  He was advised to have lab workup including INR but he never went      Historical Information   Past Medical History:   Diagnosis Date    Abnormal liver function test     LAST ASSESSED: [id number]    Athlete's foot     LAST ASSESSED: [id number]    Callus     LAST ASSESSED: [id number]    Carpal tunnel syndrome, unspecified upper limb     LAST ASSESSED: 02OCT2012    Chronic pain syndrome     LAST ASSESSED: 22OCT2013    Colon polyp     Diabetes mellitus ([facility] )     Diabetes mellitus due to underlying condition, uncontrolled, with hyperglycemia ([facility] ) 7/25/2016    Transitioned From: Diabetes mellitus type 2, uncontrolled    Herpes zoster ophthalmicus of right eye     LAST ASSESSED: [id number]    Hyperglycemia     LAST ASSESSED: [id number]    Hyperplastic colon polyp     Pericardial effusion     LAST ASSESSED: [id number]    Positive colorectal cancer screening using Cologuard test 6/8/2021     Past Surgical History:   Procedure Laterality Date    COLONOSCOPY      HEMORROIDECTOMY      LUMBAR LAMINECTOMY      LAST ASSESSED: [id number]    TONSILLECTOMY AND ADENOIDECTOMY      WISDOM TOOTH EXTRACTION       Social History   Social History     Substance and Sexual Activity   Alcohol Use No     Social History     Substance and Sexual Activity   Drug Use No     Social History     Tobacco Use   Smoking Status Current Every Day Smoker    Packs/day: 0 50   Smokeless Tobacco Never Used     Family History   Problem Relation Age of Onset    No Known Problems Father     Hypotension Family     Prostate cancer Family     Coronary artery disease Family     Diabetes Family     Diverticulitis Mother     Breast cancer Mother Meds/Allergies     (Not in a hospital admission)      Allergies   Allergen Reactions    Duloxetine Hcl Other (See Comments)    Lisinopril Cough    Olmesartan Medoxomil-Hctz      Annotation - [id number]: Abnormal blood levels due to med       Objective     Blood pressure (!) 173/82, pulse 82, temperature (!) 97 °F (36 1 °C), temperature source Temporal, resp  rate 16, height 5' 8" (1 727 m), weight 86 3 kg (190 lb 3 2 oz), SpO2 100 %  PHYSICAL EXAM:    Gen: NAD  CV: S1 & S2 normal, RRR  CHEST: Clear to auscultate  ABD: soft, NT/ND, good bowel sounds  EXT: no edema    ASSESSMENT:     Positive cologuard, cirrhosis    PLAN:    EGD and Colonoscopy  Discussed with patient in detail about the diagnostic procedures since he did not go for INR    He agreed to proceed with diagnostic EGD an and to come back if he needs any further interventionrth

## 2021-09-13 NOTE — ANESTHESIA PREPROCEDURE EVALUATION
Procedure:  COLONOSCOPY  EGD    Relevant Problems   CARDIO   (+) Benign essential hypertension      ENDO   (+) Controlled type 2 diabetes mellitus without complication, with long-term current use of insulin (HCC)      GI/HEPATIC   (+) Cirrhosis of liver with ascites (HCC)      /RENAL   (+) Right renal stone      NEURO/PSYCH   (+) Generalized anxiety disorder      PULMONARY   (+) Current smoker        Physical Exam    Airway    Mallampati score: III  TM Distance: >3 FB  Neck ROM: full     Dental   No notable dental hx     Cardiovascular  Cardiovascular exam normal    Pulmonary  Pulmonary exam normal     Other Findings        Anesthesia Plan  ASA Score- 2     Anesthesia Type- IV sedation with anesthesia with ASA Monitors  Additional Monitors:   Airway Plan:           Plan Factors-Exercise tolerance (METS): >4 METS  Chart reviewed  Existing labs reviewed  Patient summary reviewed  Patient is a current smoker  Patient smoked on day of surgery  Induction- intravenous  Postoperative Plan-     Informed Consent- Anesthetic plan and risks discussed with patient

## 2021-09-15 ENCOUNTER — APPOINTMENT (OUTPATIENT)
Dept: LAB | Facility: CLINIC | Age: 66
End: 2021-09-15
Payer: COMMERCIAL

## 2021-09-15 DIAGNOSIS — K74.60 HEPATIC CIRRHOSIS, UNSPECIFIED HEPATIC CIRRHOSIS TYPE, UNSPECIFIED WHETHER ASCITES PRESENT (HCC): ICD-10-CM

## 2021-09-15 LAB
AFP-TM SERPL-MCNC: 1.9 NG/ML (ref 0.5–8)
ALBUMIN SERPL BCP-MCNC: 3.3 G/DL (ref 3.5–5)
ALP SERPL-CCNC: 231 U/L (ref 46–116)
ALT SERPL W P-5'-P-CCNC: 24 U/L (ref 12–78)
AST SERPL W P-5'-P-CCNC: 21 U/L (ref 5–45)
BILIRUB DIRECT SERPL-MCNC: 0.4 MG/DL (ref 0–0.2)
BILIRUB SERPL-MCNC: 0.8 MG/DL (ref 0.2–1)
FERRITIN SERPL-MCNC: 115 NG/ML (ref 8–388)
HBV CORE AB SER QL: NORMAL
HBV CORE IGM SER QL: NORMAL
HBV SURFACE AG SER QL: NORMAL
HCV AB SER QL: NORMAL
INR PPP: 1.19 (ref 0.84–1.19)
IRON SATN MFR SERPL: 13 % (ref 20–50)
IRON SERPL-MCNC: 50 UG/DL (ref 65–175)
PROT SERPL-MCNC: 7.8 G/DL (ref 6.4–8.2)
PROTHROMBIN TIME: 14.6 SECONDS (ref 11.6–14.5)
TIBC SERPL-MCNC: 378 UG/DL (ref 250–450)

## 2021-09-15 PROCEDURE — 86705 HEP B CORE ANTIBODY IGM: CPT

## 2021-09-15 PROCEDURE — 86803 HEPATITIS C AB TEST: CPT

## 2021-09-15 PROCEDURE — 36415 COLL VENOUS BLD VENIPUNCTURE: CPT

## 2021-09-15 PROCEDURE — 85610 PROTHROMBIN TIME: CPT

## 2021-09-15 PROCEDURE — 82390 ASSAY OF CERULOPLASMIN: CPT

## 2021-09-15 PROCEDURE — 86235 NUCLEAR ANTIGEN ANTIBODY: CPT

## 2021-09-15 PROCEDURE — 83540 ASSAY OF IRON: CPT

## 2021-09-15 PROCEDURE — 82103 ALPHA-1-ANTITRYPSIN TOTAL: CPT

## 2021-09-15 PROCEDURE — 86038 ANTINUCLEAR ANTIBODIES: CPT

## 2021-09-15 PROCEDURE — 80076 HEPATIC FUNCTION PANEL: CPT

## 2021-09-15 PROCEDURE — 86256 FLUORESCENT ANTIBODY TITER: CPT

## 2021-09-15 PROCEDURE — 82728 ASSAY OF FERRITIN: CPT

## 2021-09-15 PROCEDURE — 86704 HEP B CORE ANTIBODY TOTAL: CPT

## 2021-09-15 PROCEDURE — 82105 ALPHA-FETOPROTEIN SERUM: CPT

## 2021-09-15 PROCEDURE — 87340 HEPATITIS B SURFACE AG IA: CPT

## 2021-09-15 PROCEDURE — 83550 IRON BINDING TEST: CPT

## 2021-09-16 DIAGNOSIS — E08.65 DIABETES MELLITUS DUE TO UNDERLYING CONDITION, UNCONTROLLED, WITH HYPERGLYCEMIA (HCC): ICD-10-CM

## 2021-09-16 LAB
A1AT SERPL-MCNC: 133 MG/DL (ref 101–187)
ACTIN IGG SERPL-ACNC: 10 UNITS (ref 0–19)
CERULOPLASMIN SERPL-MCNC: 39.7 MG/DL (ref 16–31)
MITOCHONDRIA M2 IGG SER-ACNC: <20 UNITS (ref 0–20)

## 2021-09-16 RX ORDER — BLOOD SUGAR DIAGNOSTIC
STRIP MISCELLANEOUS
Qty: 100 STRIP | Refills: 15 | Status: SHIPPED | OUTPATIENT
Start: 2021-09-16 | End: 2021-12-15 | Stop reason: SDUPTHER

## 2021-09-16 RX ORDER — EMPAGLIFLOZIN 10 MG/1
TABLET, FILM COATED ORAL
Qty: 30 TABLET | Refills: 5 | Status: SHIPPED | OUTPATIENT
Start: 2021-09-16 | End: 2022-05-30

## 2021-09-17 ENCOUNTER — TELEPHONE (OUTPATIENT)
Dept: GASTROENTEROLOGY | Facility: AMBULARY SURGERY CENTER | Age: 66
End: 2021-09-17

## 2021-09-17 LAB — RYE IGE QN: NEGATIVE

## 2021-09-17 NOTE — TELEPHONE ENCOUNTER
Patient is scheduled for colonoscopy on October 14, 2021 at [facility] with Bernie Horan MD  Patient is aware of pre-procedure prep of 2 DAY BOWEL PREP PER DR VIKAS HERNANDEZ/MIRALAX/MAG CITRATE and they will be called the day prior between 2 and 6 pm for time to report for procedure   Pt had INR done per pt/bowel prep instructions mailed to pt

## 2021-10-10 DIAGNOSIS — E08.65 DIABETES MELLITUS DUE TO UNDERLYING CONDITION, UNCONTROLLED, WITH HYPERGLYCEMIA (HCC): ICD-10-CM

## 2021-10-11 RX ORDER — METFORMIN HYDROCHLORIDE 500 MG/1
2000 TABLET, EXTENDED RELEASE ORAL DAILY
Qty: 120 TABLET | Refills: 5 | Status: SHIPPED | OUTPATIENT
Start: 2021-10-11 | End: 2022-05-17

## 2021-10-13 ENCOUNTER — TELEPHONE (OUTPATIENT)
Dept: GASTROENTEROLOGY | Facility: HOSPITAL | Age: 66
End: 2021-10-13

## 2021-10-14 ENCOUNTER — ANESTHESIA EVENT (OUTPATIENT)
Dept: GASTROENTEROLOGY | Facility: HOSPITAL | Age: 66
End: 2021-10-14

## 2021-10-14 ENCOUNTER — ANESTHESIA (OUTPATIENT)
Dept: GASTROENTEROLOGY | Facility: HOSPITAL | Age: 66
End: 2021-10-14

## 2021-10-14 ENCOUNTER — HOSPITAL ENCOUNTER (OUTPATIENT)
Dept: GASTROENTEROLOGY | Facility: HOSPITAL | Age: 66
Setting detail: OUTPATIENT SURGERY
Discharge: HOME | End: 2021-10-14
Attending: INTERNAL MEDICINE | Admitting: INTERNAL MEDICINE
Payer: COMMERCIAL

## 2021-10-14 VITALS
DIASTOLIC BLOOD PRESSURE: 70 MMHG | WEIGHT: 188 LBS | HEART RATE: 73 BPM | HEIGHT: 68 IN | SYSTOLIC BLOOD PRESSURE: 113 MMHG | RESPIRATION RATE: 16 BRPM | OXYGEN SATURATION: 97 % | TEMPERATURE: 97.6 F | BODY MASS INDEX: 28.49 KG/M2

## 2021-10-14 DIAGNOSIS — R19.5 POSITIVE COLORECTAL CANCER SCREENING USING COLOGUARD TEST: ICD-10-CM

## 2021-10-14 DIAGNOSIS — R10.31 RIGHT LOWER QUADRANT ABDOMINAL PAIN: ICD-10-CM

## 2021-10-14 DIAGNOSIS — K74.60 HEPATIC CIRRHOSIS, UNSPECIFIED HEPATIC CIRRHOSIS TYPE, UNSPECIFIED WHETHER ASCITES PRESENT (HCC): ICD-10-CM

## 2021-10-14 PROCEDURE — 88305 TISSUE EXAM BY PATHOLOGIST: CPT | Performed by: PATHOLOGY

## 2021-10-14 PROCEDURE — 45385 COLONOSCOPY W/LESION REMOVAL: CPT | Performed by: INTERNAL MEDICINE

## 2021-10-14 RX ORDER — PROPOFOL 10 MG/ML
INJECTION, EMULSION INTRAVENOUS AS NEEDED
Status: DISCONTINUED | OUTPATIENT
Start: 2021-10-14 | End: 2021-10-14

## 2021-10-14 RX ORDER — EPHEDRINE SULFATE 50 MG/ML
INJECTION INTRAVENOUS AS NEEDED
Status: DISCONTINUED | OUTPATIENT
Start: 2021-10-14 | End: 2021-10-14

## 2021-10-14 RX ORDER — SODIUM CHLORIDE, SODIUM LACTATE, POTASSIUM CHLORIDE, CALCIUM CHLORIDE 600; 310; 30; 20 MG/100ML; MG/100ML; MG/100ML; MG/100ML
125 INJECTION, SOLUTION INTRAVENOUS CONTINUOUS
Status: DISCONTINUED | OUTPATIENT
Start: 2021-10-14 | End: 2021-10-18 | Stop reason: HOSPADM

## 2021-10-14 RX ORDER — PROPOFOL 10 MG/ML
INJECTION, EMULSION INTRAVENOUS CONTINUOUS PRN
Status: DISCONTINUED | OUTPATIENT
Start: 2021-10-14 | End: 2021-10-14

## 2021-10-14 RX ADMIN — EPHEDRINE SULFATE 5 MG: 50 INJECTION, SOLUTION INTRAVENOUS at 07:47

## 2021-10-14 RX ADMIN — PROPOFOL 50 MCG/KG/MIN: 10 INJECTION, EMULSION INTRAVENOUS at 07:41

## 2021-10-14 RX ADMIN — PROPOFOL 30 MG: 10 INJECTION, EMULSION INTRAVENOUS at 07:41

## 2021-10-14 RX ADMIN — SODIUM CHLORIDE, SODIUM LACTATE, POTASSIUM CHLORIDE, AND CALCIUM CHLORIDE: .6; .31; .03; .02 INJECTION, SOLUTION INTRAVENOUS at 07:30

## 2021-10-14 RX ADMIN — PROPOFOL 80 MG: 10 INJECTION, EMULSION INTRAVENOUS at 07:38

## 2021-10-14 RX ADMIN — EPHEDRINE SULFATE 5 MG: 50 INJECTION, SOLUTION INTRAVENOUS at 07:59

## 2021-11-08 DIAGNOSIS — E08.65 DIABETES MELLITUS DUE TO UNDERLYING CONDITION, UNCONTROLLED, WITH HYPERGLYCEMIA (HCC): ICD-10-CM

## 2021-11-08 RX ORDER — INSULIN GLARGINE 100 [IU]/ML
35 INJECTION, SOLUTION SUBCUTANEOUS
Qty: 11 ML | Refills: 3 | Status: SHIPPED | OUTPATIENT
Start: 2021-11-08 | End: 2022-04-14 | Stop reason: SDUPTHER

## 2021-12-09 DIAGNOSIS — F41.9 ANXIETY: ICD-10-CM

## 2021-12-09 RX ORDER — ESCITALOPRAM OXALATE 20 MG/1
TABLET ORAL
Qty: 30 TABLET | Refills: 5 | Status: SHIPPED | OUTPATIENT
Start: 2021-12-09

## 2021-12-15 ENCOUNTER — APPOINTMENT (OUTPATIENT)
Dept: LAB | Facility: CLINIC | Age: 66
End: 2021-12-15
Payer: COMMERCIAL

## 2021-12-15 ENCOUNTER — TELEPHONE (OUTPATIENT)
Dept: INTERNAL MEDICINE CLINIC | Facility: CLINIC | Age: 66
End: 2021-12-15

## 2021-12-15 DIAGNOSIS — E11.9 CONTROLLED TYPE 2 DIABETES MELLITUS WITHOUT COMPLICATION, WITH LONG-TERM CURRENT USE OF INSULIN (HCC): ICD-10-CM

## 2021-12-15 DIAGNOSIS — E78.5 DYSLIPIDEMIA: ICD-10-CM

## 2021-12-15 DIAGNOSIS — Z79.4 CONTROLLED TYPE 2 DIABETES MELLITUS WITHOUT COMPLICATION, WITH LONG-TERM CURRENT USE OF INSULIN (HCC): ICD-10-CM

## 2021-12-15 LAB
CHOLEST SERPL-MCNC: 109 MG/DL
EST. AVERAGE GLUCOSE BLD GHB EST-MCNC: 169 MG/DL
HBA1C MFR BLD: 7.5 %
HDLC SERPL-MCNC: 37 MG/DL
LDLC SERPL CALC-MCNC: 57 MG/DL (ref 0–100)
TRIGL SERPL-MCNC: 74 MG/DL

## 2021-12-15 PROCEDURE — 36415 COLL VENOUS BLD VENIPUNCTURE: CPT

## 2021-12-15 PROCEDURE — 83036 HEMOGLOBIN GLYCOSYLATED A1C: CPT

## 2021-12-15 PROCEDURE — 80061 LIPID PANEL: CPT

## 2022-01-07 ENCOUNTER — OFFICE VISIT (OUTPATIENT)
Dept: INTERNAL MEDICINE CLINIC | Facility: CLINIC | Age: 67
End: 2022-01-07
Payer: COMMERCIAL

## 2022-01-07 VITALS
HEIGHT: 68 IN | SYSTOLIC BLOOD PRESSURE: 158 MMHG | TEMPERATURE: 97.2 F | WEIGHT: 198.6 LBS | OXYGEN SATURATION: 99 % | BODY MASS INDEX: 30.1 KG/M2 | HEART RATE: 68 BPM | DIASTOLIC BLOOD PRESSURE: 90 MMHG

## 2022-01-07 DIAGNOSIS — Z79.4 CONTROLLED TYPE 2 DIABETES MELLITUS WITHOUT COMPLICATION, WITH LONG-TERM CURRENT USE OF INSULIN (HCC): Primary | ICD-10-CM

## 2022-01-07 DIAGNOSIS — F17.200 CURRENT SMOKER: ICD-10-CM

## 2022-01-07 DIAGNOSIS — Z12.5 SCREENING PSA (PROSTATE SPECIFIC ANTIGEN): ICD-10-CM

## 2022-01-07 DIAGNOSIS — E11.9 CONTROLLED TYPE 2 DIABETES MELLITUS WITHOUT COMPLICATION, WITH LONG-TERM CURRENT USE OF INSULIN (HCC): Primary | ICD-10-CM

## 2022-01-07 DIAGNOSIS — E78.5 DYSLIPIDEMIA: ICD-10-CM

## 2022-01-07 DIAGNOSIS — I10 BENIGN ESSENTIAL HYPERTENSION: ICD-10-CM

## 2022-01-07 DIAGNOSIS — F41.1 GENERALIZED ANXIETY DISORDER: ICD-10-CM

## 2022-01-07 DIAGNOSIS — E55.9 VITAMIN D DEFICIENCY: ICD-10-CM

## 2022-01-07 PROBLEM — R10.31 RIGHT LOWER QUADRANT ABDOMINAL PAIN: Status: RESOLVED | Noted: 2021-06-08 | Resolved: 2022-01-07

## 2022-01-07 PROCEDURE — 3080F DIAST BP >= 90 MM HG: CPT | Performed by: INTERNAL MEDICINE

## 2022-01-07 PROCEDURE — 1160F RVW MEDS BY RX/DR IN RCRD: CPT | Performed by: INTERNAL MEDICINE

## 2022-01-07 PROCEDURE — 3008F BODY MASS INDEX DOCD: CPT | Performed by: INTERNAL MEDICINE

## 2022-01-07 PROCEDURE — 3077F SYST BP >= 140 MM HG: CPT | Performed by: INTERNAL MEDICINE

## 2022-01-07 PROCEDURE — 99214 OFFICE O/P EST MOD 30 MIN: CPT | Performed by: INTERNAL MEDICINE

## 2022-01-07 PROCEDURE — 1125F AMNT PAIN NOTED PAIN PRSNT: CPT | Performed by: INTERNAL MEDICINE

## 2022-01-07 PROCEDURE — 3288F FALL RISK ASSESSMENT DOCD: CPT | Performed by: INTERNAL MEDICINE

## 2022-01-07 PROCEDURE — 4004F PT TOBACCO SCREEN RCVD TLK: CPT | Performed by: INTERNAL MEDICINE

## 2022-01-07 PROCEDURE — 1170F FXNL STATUS ASSESSED: CPT | Performed by: INTERNAL MEDICINE

## 2022-01-07 PROCEDURE — 3725F SCREEN DEPRESSION PERFORMED: CPT | Performed by: INTERNAL MEDICINE

## 2022-01-07 NOTE — PATIENT INSTRUCTIONS
Start checking BP at home  BP should be under 130/80        Medicare Preventive Visit Patient Instructions  Thank you for completing your Welcome to Medicare Visit or Medicare Annual Wellness Visit today  Your next wellness visit will be due in one year (1/8/2023)  The screening/preventive services that you may require over the next 5-10 years are detailed below  Some tests may not apply to you based off risk factors and/or age  Screening tests ordered at today's visit but not completed yet may show as past due  Also, please note that scanned in results may not display below  Preventive Screenings:  Service Recommendations Previous Testing/Comments   Colorectal Cancer Screening  · Colonoscopy    · Fecal Occult Blood Test (FOBT)/Fecal Immunochemical Test (FIT)  · Fecal DNA/Cologuard Test  · Flexible Sigmoidoscopy Age: 54-65 years old   Colonoscopy: every 10 years (May be performed more frequently if at higher risk)  OR  FOBT/FIT: every 1 year  OR  Cologuard: every 3 years  OR  Sigmoidoscopy: every 5 years  Screening may be recommended earlier than age 48 if at higher risk for colorectal cancer  Also, an individualized decision between you and your healthcare provider will decide whether screening between the ages of 74-80 would be appropriate   Colonoscopy: 10/14/2021  FOBT/FIT: Not on file  Cologuard: 03/16/2021  Sigmoidoscopy: Not on file          Prostate Cancer Screening Individualized decision between patient and health care provider in men between ages of 53-78   Medicare will cover every 12 months beginning on the day after your 50th birthday PSA: 0 2 ng/mL           Hepatitis C Screening Once for adults born between 1945 and 1965  More frequently in patients at high risk for Hepatitis C Hep C Antibody: 05/21/2020        Diabetes Screening 1-2 times per year if you're at risk for diabetes or have pre-diabetes Fasting glucose: 92 mg/dL   A1C: 7 5 %        Cholesterol Screening Once every 5 years if you don't have a lipid disorder  May order more often based on risk factors  Lipid panel: 12/15/2021           Other Preventive Screenings Covered by Medicare:  1  Abdominal Aortic Aneurysm (AAA) Screening: covered once if your at risk  You're considered to be at risk if you have a family history of AAA or a male between the age of 73-68 who smoking at least 100 cigarettes in your lifetime  2  Lung Cancer Screening: covers low dose CT scan once per year if you meet all of the following conditions: (1) Age 50-69; (2) No signs or symptoms of lung cancer; (3) Current smoker or have quit smoking within the last 15 years; (4) You have a tobacco smoking history of at least 30 pack years (packs per day x number of years you smoked); (5) You get a written order from a healthcare provider  3  Glaucoma Screening: covered annually if you're considered high risk: (1) You have diabetes OR (2) Family history of glaucoma OR (3)  aged 48 and older OR (3)  American aged 72 and older  3  Osteoporosis Screening: covered every 2 years if you meet one of the following conditions: (1) Have a vertebral abnormality; (2) On glucocorticoid therapy for more than 3 months; (3) Have primary hyperparathyroidism; (4) On osteoporosis medications and need to assess response to drug therapy  5  HIV Screening: covered annually if you're between the age of 12-76  Also covered annually if you are younger than 13 and older than 72 with risk factors for HIV infection  For pregnant patients, it is covered up to 3 times per pregnancy      Immunizations:  Immunization Recommendations   Influenza Vaccine Annual influenza vaccination during flu season is recommended for all persons aged >= 6 months who do not have contraindications   Pneumococcal Vaccine (Prevnar and Pneumovax)  * Prevnar = PCV13  * Pneumovax = PPSV23 Adults 25-60 years old: 1-3 doses may be recommended based on certain risk factors  Adults 72 years old: Prevnar (PCV13) vaccine recommended followed by Pneumovax (PPSV23) vaccine  If already received PPSV23 since turning 65, then PCV13 recommended at least one year after PPSV23 dose  Hepatitis B Vaccine 3 dose series if at intermediate or high risk (ex: diabetes, end stage renal disease, liver disease)   Tetanus (Td) Vaccine - COST NOT COVERED BY MEDICARE PART B Following completion of primary series, a booster dose should be given every 10 years to maintain immunity against tetanus  Td may also be given as tetanus wound prophylaxis  Tdap Vaccine - COST NOT COVERED BY MEDICARE PART B Recommended at least once for all adults  For pregnant patients, recommended with each pregnancy  Shingles Vaccine (Shingrix) - COST NOT COVERED BY MEDICARE PART B  2 shot series recommended in those aged 48 and above     Health Maintenance Due:      Topic Date Due    Colorectal Cancer Screening  10/13/2024    HIV Screening  Completed    Hepatitis C Screening  Completed     Immunizations Due:      Topic Date Due    Hepatitis A Vaccine (1 of 2 - Risk 2-dose series) Never done    COVID-19 Vaccine (1) Never done    Hepatitis B Vaccine (1 of 3 - Risk 3-dose series) Never done    Influenza Vaccine (1) 09/01/2021     Advance Directives   What are advance directives? Advance directives are legal documents that state your wishes and plans for medical care  These plans are made ahead of time in case you lose your ability to make decisions for yourself  Advance directives can apply to any medical decision, such as the treatments you want, and if you want to donate organs  What are the types of advance directives? There are many types of advance directives, and each state has rules about how to use them  You may choose a combination of any of the following:  · Living will: This is a written record of the treatment you want  You can also choose which treatments you do not want, which to limit, and which to stop at a certain time   This includes surgery, medicine, IV fluid, and tube feedings  · Durable power of  for healthcare [facility]): This is a written record that states who you want to make healthcare choices for you when you are unable to make them for yourself  This person, called a proxy, is usually a family member or a friend  You may choose more than 1 proxy  · Do not resuscitate (DNR) order:  A DNR order is used in case your heart stops beating or you stop breathing  It is a request not to have certain forms of treatment, such as CPR  A DNR order may be included in other types of advance directives  · Medical directive: This covers the care that you want if you are in a coma, near death, or unable to make decisions for yourself  You can list the treatments you want for each condition  Treatment may include pain medicine, surgery, blood transfusions, dialysis, IV or tube feedings, and a ventilator (breathing machine)  · Values history: This document has questions about your views, beliefs, and how you feel and think about life  This information can help others choose the care that you would choose  Why are advance directives important? An advance directive helps you control your care  Although spoken wishes may be used, it is better to have your wishes written down  Spoken wishes can be misunderstood, or not followed  Treatments may be given even if you do not want them  An advance directive may make it easier for your family to make difficult choices about your care  Cigarette Smoking and Your Health   Risks to your health if you smoke:  Nicotine and other chemicals found in tobacco damage every cell in your body  Even if you are a light smoker, you have an increased risk for cancer, heart disease, and lung disease  If you are pregnant or have diabetes, smoking increases your risk for complications     Benefits to your health if you stop smoking:   · You decrease respiratory symptoms such as coughing, wheezing, and shortness of breath  · You reduce your risk for cancers of the lung, mouth, throat, kidney, bladder, pancreas, stomach, and cervix  If you already have cancer, you increase the benefits of chemotherapy  You also reduce your risk for cancer returning or a second cancer from developing  · You reduce your risk for heart disease, blood clots, heart attack, and stroke  · You reduce your risk for lung infections, and diseases such as pneumonia, asthma, chronic bronchitis, and emphysema  · Your circulation improves  More oxygen can be delivered to your body  If you have diabetes, you lower your risk for complications, such as kidney, artery, and eye diseases  You also lower your risk for nerve damage  Nerve damage can lead to amputations, poor vision, and blindness  · You improve your body's ability to heal and to fight infections  For more information and support to stop smoking:   · [employer]  Phone: 0- 166 - [phone]  Web Address: [employer]  Weight Management   Why it is important to manage your weight:  Being overweight increases your risk of health conditions such as heart disease, high blood pressure, type 2 diabetes, and certain types of cancer  It can also increase your risk for osteoarthritis, sleep apnea, and other respiratory problems  Aim for a slow, steady weight loss  Even a small amount of weight loss can lower your risk of health problems  How to lose weight safely:  A safe and healthy way to lose weight is to eat fewer calories and get regular exercise  You can lose up about 1 pound a week by decreasing the number of calories you eat by 500 calories each day  Healthy meal plan for weight management:  A healthy meal plan includes a variety of foods, contains fewer calories, and helps you stay healthy  A healthy meal plan includes the following:  · Eat whole-grain foods more often  A healthy meal plan should contain fiber   Fiber is the part of grains, fruits, and vegetables that is not broken down by your body  Whole-grain foods are healthy and provide extra fiber in your diet  Some examples of whole-grain foods are whole-wheat breads and pastas, oatmeal, brown rice, and bulgur  · Eat a variety of vegetables every day  Include dark, leafy greens such as spinach, kale, jennifer greens, and mustard greens  Eat yellow and orange vegetables such as carrots, sweet potatoes, and winter squash  · Eat a variety of fruits every day  Choose fresh or canned fruit (canned in its own juice or light syrup) instead of juice  Fruit juice has very little or no fiber  · Eat low-fat dairy foods  Drink fat-free (skim) milk or 1% milk  Eat fat-free yogurt and low-fat cottage cheese  Try low-fat cheeses such as mozzarella and other reduced-fat cheeses  · Choose meat and other protein foods that are low in fat  Choose beans or other legumes such as split peas or lentils  Choose fish, skinless poultry (chicken or turkey), or lean cuts of red meat (beef or pork)  Before you cook meat or poultry, cut off any visible fat  · Use less fat and oil  Try baking foods instead of frying them  Add less fat, such as margarine, sour cream, regular salad dressing and mayonnaise to foods  Eat fewer high-fat foods  Some examples of high-fat foods include french fries, doughnuts, ice cream, and cakes  · Eat fewer sweets  Limit foods and drinks that are high in sugar  This includes candy, cookies, regular soda, and sweetened drinks  Exercise:  Exercise at least 30 minutes per day on most days of the week  Some examples of exercise include walking, biking, dancing, and swimming  You can also fit in more physical activity by taking the stairs instead of the elevator or parking farther away from stores  Ask your healthcare provider about the best exercise plan for you  © Copyright [employer] 2018 Information is for End User's use only and may not be sold, redistributed or otherwise used for commercial purposes   All illustrations and images included in CareNotes® are the copyrighted property of [employer] Inc  or Karey Rolle

## 2022-01-07 NOTE — ASSESSMENT & PLAN NOTE
High today  He has not tolerated ARB and SIXTO in the past  Discsused trial of amlodipine again but he prefers to make dietary changes and not be on any additional meds  Advised to obtain a BP cuff and start checking at home goal <130/80

## 2022-01-07 NOTE — PROGRESS NOTES
Assessment and Plan:     Problem List Items Addressed This Visit     None      Visit Diagnoses     Welcome to Medicare preventive visit    -  Primary           Preventive health issues were discussed with patient, and age appropriate screening tests were ordered as noted in patient's After Visit Summary  Personalized health advice and appropriate referrals for health education or preventive services given if needed, as noted in patient's After Visit Summary  History of Present Illness:     Patient presents for Welcome to Medicare visit       Patient Care Team:  Gordon Caraballo MD as PCP - Win Reed MD as PCP - PCP-[facility] Attributed-PRANAY Ambrose, Pharmacist as Pharmacist (Pharmacy)     Review of Systems:     Review of Systems   Problem List:     Patient Active Problem List   Diagnosis    Benign essential hypertension    Obesity    Vitamin D deficiency    Dyslipidemia    Chronic pain of right knee    Generalized anxiety disorder    Current smoker    Insulin long-term use ([address]    Right lower quadrant abdominal pain    Controlled type 2 diabetes mellitus without complication, with long-term current use of insulin ([address] )    Gall stones    Cirrhosis of liver with ascites ([address] )    Right renal stone      Past Medical and Surgical History:     Past Medical History:   Diagnosis Date    Abnormal liver function test     LAST ASSESSED: [id number]    Anxiety     Athlete's foot     LAST ASSESSED: [id number]    Callus     LAST ASSESSED: [id number]    Carpal tunnel syndrome, unspecified upper limb     LAST ASSESSED: 02OCT2012    Chronic pain syndrome     LAST ASSESSED: 22OCT2013    Colon polyp     Diabetes mellitus ([address] )     Diabetes mellitus due to underlying condition, uncontrolled, with hyperglycemia ([address] ) 7/25/2016    Transitioned From: Diabetes mellitus type 2, uncontrolled    Herpes zoster ophthalmicus of right eye     LAST ASSESSED: [id number]    Hyperglycemia     LAST ASSESSED: [id number]    Hyperplastic colon polyp     Pericardial effusion     LAST ASSESSED: [id number]    Positive colorectal cancer screening using Cologuard test 6/8/2021     Past Surgical History:   Procedure Laterality Date    COLONOSCOPY      HEMORROIDECTOMY      LUMBAR LAMINECTOMY      LAST ASSESSED: [id number]    TONSILLECTOMY AND ADENOIDECTOMY      WISDOM TOOTH EXTRACTION        Family History:     Family History   Problem Relation Age of Onset    No Known Problems Father     Hypotension Family     Prostate cancer Family     Coronary artery disease Family     Diabetes Family     Diverticulitis Mother     Breast cancer Mother       Social History:     Social History     Socioeconomic History    Marital status: Single     Spouse name: Not on file    Number of children: Not on file    Years of education: Not on file    Highest education level: Not on file   Occupational History    Not on file   Tobacco Use    Smoking status: Current Every Day Smoker     Packs/day: 0 50     Types: Cigarettes    Smokeless tobacco: Never Used   Vaping Use    Vaping Use: Never used   Substance and Sexual Activity    Alcohol use: Yes     Comment: rarey    Drug use: No    Sexual activity: Not on file   Other Topics Concern    Not on file   Social History Narrative    Not on file     Social Determinants of Health     Financial Resource Strain: Not on file   Food Insecurity: Not on file   Transportation Needs: Not on file   Physical Activity: Not on file   Stress: Not on file   Social Connections: Not on file   Intimate Partner Violence: Not on file   Housing Stability: Not on file      Medications and Allergies:     Current Outpatient Medications   Medication Sig Dispense Refill    aspirin 81 mg chewable tablet Chew 1 tablet daily      atorvastatin (LIPITOR) 20 mg tablet Take 1 tablet (20 mg total) by mouth every other day 30 tablet 11    B-D UF III MINI PEN NEEDLES 31G X 5 MM MISC BY DOES NOT APPLY ROUTE DAILY TO INJECT INSULIN 100 each 6    escitalopram (LEXAPRO) 20 mg tablet TAKE 1 TABLET BY MOUTH EVERY DAY 30 tablet 5    famotidine (PEPCID) 20 mg tablet Take 1 tablet (20 mg total) by mouth 2 (two) times a day 60 tablet 11    glucose blood (OneTouch Ultra) test strip Use 1 each 3 (three) times a day Use as instructed 300 strip 3    insulin glargine (Lantus SoloStar) 100 units/mL injection pen Inject 35 Units under the skin daily at bedtime 11 mL 3    Jardiance 10 MG TABS TAKE 1 TABLET (10 MG TOTAL) BY MOUTH EVERY MORNING FOR DIABETES 30 tablet 5    Lancets (OneTouch Delica Plus [id number]) MISC TEST DAILY 100 each 1    metFORMIN (GLUCOPHAGE-XR) 500 mg 24 hr tablet TAKE 4 TABLETS (2,000 MG TOTAL) BY MOUTH DAILY FOR DIABETES 120 tablet 5    metoprolol tartrate (LOPRESSOR) 100 mg tablet TAKE 1 TABLET BY MOUTH TWICE A DAY 60 tablet 5     No current facility-administered medications for this visit  Allergies   Allergen Reactions    Duloxetine Hcl Other (See Comments)    Lisinopril Cough    Olmesartan Medoxomil-Hctz      Annotation - [id number]:  Abnormal blood levels due to med      Immunizations:     Immunization History   Administered Date(s) Administered    Influenza Quadrivalent Preservative Free 3 years and older IM 12/29/2015    Influenza, recombinant, quadrivalent,injectable, preservative free 11/02/2018    Influenza, seasonal, injectable 10/02/2012, 05/02/2013, 12/18/2013    Pneumococcal Polysaccharide PPV23 11/02/2018    Td (adult), adsorbed 1956    Tdap 04/10/2019      Health Maintenance:         Topic Date Due    Colorectal Cancer Screening  10/13/2024    HIV Screening  Completed    Hepatitis C Screening  Completed         Topic Date Due    Hepatitis A Vaccine (1 of 2 - Risk 2-dose series) Never done    COVID-19 Vaccine (1) Never done    Hepatitis B Vaccine (1 of 3 - Risk 3-dose series) Never done    Influenza Vaccine (1) 09/01/2021      Medicare Screening Tests and Risk Assessments:     Santos Silva is here for his Welcome to Medicare visit  Health Risk Assessment:   Patient rates overall health as fair  Patient feels that their physical health rating is same  Patient is satisfied with their life  Eyesight was rated as same  Hearing was rated as same  Patient feels that their emotional and mental health rating is same  Patients states they are never, rarely angry  Patient states they are sometimes unusually tired/fatigued  Pain experienced in the last 7 days has been some  Patient's pain rating has been 2/10  Patient states that he has experienced weight loss or gain in last 6 months  Depression Screening:   PHQ-2 Score: 0      Fall Risk Screening: In the past year, patient has experienced: no history of falling in past year      Home Safety:  Patient does not have trouble with stairs inside or outside of their home  Patient has working smoke alarms and has working carbon monoxide detector  Home safety hazards include: none  Nutrition:   Current diet is Low Cholesterol  Medications:   Patient is not currently taking any over-the-counter supplements  Patient is able to manage medications  Activities of Daily Living (ADLs)/Instrumental Activities of Daily Living (IADLs):   Walk and transfer into and out of bed and chair?: Yes  Dress and groom yourself?: Yes    Bathe or shower yourself?: Yes    Feed yourself?  Yes  Do your laundry/housekeeping?: Yes  Manage your money, pay your bills and track your expenses?: Yes  Make your own meals?: Yes    Do your own shopping?: Yes    Previous Hospitalizations:   Any hospitalizations or ED visits within the last 12 months?: No      Advance Care Planning:   Living will: No    Durable POA for healthcare: No    Advanced directive: No      PREVENTIVE SCREENINGS      Cardiovascular Screening:    General: Screening Current      Diabetes Screening:     General: Screening Not Indicated and History Diabetes Colorectal Cancer Screening:     General: Screening Current      Prostate Cancer Screening:    General: Screening Current      Abdominal Aortic Aneurysm (AAA) Screening:    Risk factors include: age between 73-67 yo and tobacco use        Lung Cancer Screening:     General: Screening Not Indicated      Hepatitis C Screening:    General: Screening Current    Screening, Brief Intervention, and Referral to Treatment (SBIRT)    Screening  Typical number of drinks in a day: 0  Typical number of drinks in a week: 0  Interpretation: Low risk drinking behavior  Single Item Drug Screening:  How often have you used an illegal drug (including marijuana) or a prescription medication for non-medical reasons in the past year? never    Single Item Drug Screen Score: 0  Interpretation: Negative screen for possible drug use disorder    No exam data present     Physical Exam:     There were no vitals taken for this visit      Physical Exam     Sharad Bosch MD

## 2022-01-07 NOTE — PROGRESS NOTES
Assessment/Plan:    Controlled type 2 diabetes mellitus without complication, with long-term current use of insulin ([facility])  Prefers to make detary changes and not raise dose of Lantus at this time  Continue Jardiance and metformin  Lab Results   Component Value Date    HGBA1C 7 5 (H) 12/15/2021       Benign essential hypertension  High today  He has not tolerated ARB and SIXTO in the past  Discsused trial of amlodipine again but he prefers to make dietary changes and not be on any additional meds  Advised to obtain a BP cuff and start checking at home goal <130/80    Generalized anxiety disorder  Stable on Lexapro    Will add MD[facility] wellness to next visit to make it his Welcome to [facility] visit (eye exam was not performed today)     Problem List Items Addressed This Visit        Endocrine    Controlled type 2 diabetes mellitus without complication, with long-term current use of insulin ([address] ) - Primary     Prefers to make detary changes and not raise dose of Lantus at this time  Continue Jardiance and metformin  Lab Results   Component Value Date    HGBA1C 7 5 (H) 12/15/2021            Relevant Orders    CBC and differential    Comprehensive metabolic panel    HEMOGLOBIN A1C W/ EAG ESTIMATION    Lipid Panel with Direct LDL reflex    TSH, 3rd generation with Free T4 reflex    Vitamin D 25 hydroxy    Microalbumin / creatinine urine ratio       Cardiovascular and Mediastinum    Benign essential hypertension     High today  He has not tolerated ARB and SIXOT in the past  Discsused trial of amlodipine again but he prefers to make dietary changes and not be on any additional meds  Advised to obtain a BP cuff and start checking at home goal <130/80         Relevant Orders    CBC and differential    Comprehensive metabolic panel       Other    Current smoker    Relevant Orders    CT lung screening program    Vitamin D deficiency    Relevant Orders    Vitamin D 25 hydroxy    Dyslipidemia    Relevant Orders    Comprehensive metabolic panel    Lipid Panel with Direct LDL reflex    Generalized anxiety disorder     Stable on Lexapro           Other Visit Diagnoses     Screening PSA (prostate specific antigen)        Relevant Orders    PSA, Total Screen            Subjective:      Patient ID: Goldie Monge is a 73 y o  male  HPI  Here for a follow up  DM with A1C up to 7 5  FBS <130 on Lantus 35 units Jardiance and metformin; over the holidays it was up to 150s  No hypoglycemic spells  Lipids normal  Diet has been poor over the holidays and he has been sedentary    The following portions of the patient's history were reviewed and updated as appropriate: allergies, current medications, past family history, past medical history, past social history, past surgical history and problem list     Review of Systems   Constitutional: Positive for unexpected weight change (weight gain)  Negative for chills, fatigue and fever  HENT: Negative for rhinorrhea  Respiratory: Positive for cough (occasional, smokes 1/2 ppd)  Negative for shortness of breath  Cardiovascular: Negative for chest pain, palpitations and leg swelling  Gastrointestinal: Negative for abdominal pain, constipation, diarrhea, nausea and vomiting  Endocrine: Negative for polydipsia and polyuria  Genitourinary: Negative for difficulty urinating  Neurological: Negative for dizziness, numbness and headaches  Objective:      /90   Pulse 68   Temp (!) 97 2 °F (36 2 °C)   Ht 5' 8" (1 727 m)   Wt 90 1 kg (198 lb 9 6 oz)   SpO2 99%   BMI 30 20 kg/m²          Physical Exam  Constitutional:       General: He is not in acute distress  Appearance: He is well-developed  He is not ill-appearing, toxic-appearing or diaphoretic  HENT:      Head: Normocephalic and atraumatic  Right Ear: External ear normal  There is no impacted cerumen  Left Ear: External ear normal  There is no impacted cerumen     Eyes:      Conjunctiva/sclera: Conjunctivae normal  Cardiovascular:      Rate and Rhythm: Normal rate and regular rhythm  Heart sounds: Normal heart sounds  No murmur heard  Pulmonary:      Effort: Pulmonary effort is normal  No respiratory distress  Breath sounds: Normal breath sounds  No wheezing or rales  Abdominal:      General: There is no distension  Palpations: Abdomen is soft  There is no mass  Tenderness: There is no abdominal tenderness  There is no guarding or rebound  Musculoskeletal:      Cervical back: Neck supple  Right lower leg: No edema  Left lower leg: No edema  Neurological:      Mental Status: He is alert and oriented to person, place, and time  Psychiatric:         Mood and Affect: Mood normal          Behavior: Behavior normal          Thought Content:  Thought content normal          Judgment: Judgment normal

## 2022-01-07 NOTE — ASSESSMENT & PLAN NOTE
Prefers to make detary changes and not raise dose of Lantus at this time  Continue Jardiance and metformin  Lab Results   Component Value Date    HGBA1C 7 5 (H) 12/15/2021

## 2022-01-11 ENCOUNTER — TELEPHONE (OUTPATIENT)
Dept: GASTROENTEROLOGY | Facility: AMBULARY SURGERY CENTER | Age: 67
End: 2022-01-11

## 2022-03-27 DIAGNOSIS — I10 HYPERTENSION, ESSENTIAL, BENIGN: ICD-10-CM

## 2022-03-27 RX ORDER — METOPROLOL TARTRATE 100 MG/1
TABLET ORAL
Qty: 180 TABLET | Refills: 1 | Status: SHIPPED | OUTPATIENT
Start: 2022-03-27

## 2022-03-28 DIAGNOSIS — E78.5 DYSLIPIDEMIA: ICD-10-CM

## 2022-03-28 DIAGNOSIS — E11.9 CONTROLLED TYPE 2 DIABETES MELLITUS WITHOUT COMPLICATION, WITH LONG-TERM CURRENT USE OF INSULIN (HCC): ICD-10-CM

## 2022-03-28 DIAGNOSIS — Z79.4 CONTROLLED TYPE 2 DIABETES MELLITUS WITHOUT COMPLICATION, WITH LONG-TERM CURRENT USE OF INSULIN (HCC): ICD-10-CM

## 2022-03-28 RX ORDER — ATORVASTATIN CALCIUM 20 MG/1
TABLET, FILM COATED ORAL
Qty: 90 TABLET | Refills: 3 | Status: SHIPPED | OUTPATIENT
Start: 2022-03-28

## 2022-04-14 DIAGNOSIS — E08.65 DIABETES MELLITUS DUE TO UNDERLYING CONDITION, UNCONTROLLED, WITH HYPERGLYCEMIA (HCC): ICD-10-CM

## 2022-04-14 RX ORDER — INSULIN GLARGINE 100 [IU]/ML
35 INJECTION, SOLUTION SUBCUTANEOUS
Qty: 11 ML | Refills: 0 | Status: SHIPPED | OUTPATIENT
Start: 2022-04-14 | End: 2022-06-13

## 2022-04-14 NOTE — TELEPHONE ENCOUNTER
Patient would like insulin as a 30 day supply only moving forward  A 90 day supply is too costly for patient  Patient needs it for tomorrow

## 2022-05-05 ENCOUNTER — ESTABLISHED COMPREHENSIVE EXAM (OUTPATIENT)
Dept: URBAN - METROPOLITAN AREA CLINIC 6 | Facility: CLINIC | Age: 67
End: 2022-05-05

## 2022-05-05 DIAGNOSIS — E10.9: ICD-10-CM

## 2022-05-05 LAB
LEFT EYE DIABETIC RETINOPATHY: NORMAL
RIGHT EYE DIABETIC RETINOPATHY: NORMAL

## 2022-05-05 PROCEDURE — 92014 COMPRE OPH EXAM EST PT 1/>: CPT

## 2022-05-05 ASSESSMENT — VISUAL ACUITY
OU_SC: J1
OS_SC: 20/30
OU_SC: 20/25-2
OD_SC: 20/30

## 2022-05-05 ASSESSMENT — TONOMETRY
OD_IOP_MMHG: 14
OS_IOP_MMHG: 14

## 2022-05-17 DIAGNOSIS — E08.65 DIABETES MELLITUS DUE TO UNDERLYING CONDITION, UNCONTROLLED, WITH HYPERGLYCEMIA (HCC): ICD-10-CM

## 2022-05-17 RX ORDER — METFORMIN HYDROCHLORIDE 500 MG/1
2000 TABLET, EXTENDED RELEASE ORAL DAILY
Qty: 360 TABLET | Refills: 1 | Status: SHIPPED | OUTPATIENT
Start: 2022-05-17

## 2022-05-30 DIAGNOSIS — E08.65 DIABETES MELLITUS DUE TO UNDERLYING CONDITION, UNCONTROLLED, WITH HYPERGLYCEMIA (HCC): ICD-10-CM

## 2022-05-30 RX ORDER — EMPAGLIFLOZIN 10 MG/1
TABLET, FILM COATED ORAL
Qty: 30 TABLET | Refills: 5 | Status: SHIPPED | OUTPATIENT
Start: 2022-05-30

## 2022-06-13 DIAGNOSIS — E08.65 DIABETES MELLITUS DUE TO UNDERLYING CONDITION, UNCONTROLLED, WITH HYPERGLYCEMIA (HCC): ICD-10-CM

## 2022-06-13 RX ORDER — INSULIN GLARGINE 100 [IU]/ML
35 INJECTION, SOLUTION SUBCUTANEOUS
Qty: 15 ML | Refills: 0 | Status: SHIPPED | OUTPATIENT
Start: 2022-06-13

## 2022-07-07 DIAGNOSIS — E08.65 DIABETES MELLITUS DUE TO UNDERLYING CONDITION, UNCONTROLLED, WITH HYPERGLYCEMIA (HCC): ICD-10-CM

## 2022-07-08 RX ORDER — PEN NEEDLE, DIABETIC 31 GX5/16"
NEEDLE, DISPOSABLE MISCELLANEOUS
Qty: 100 EACH | Refills: 6 | Status: SHIPPED | OUTPATIENT
Start: 2022-07-08

## 2022-08-22 ENCOUNTER — TELEPHONE (OUTPATIENT)
Dept: ADMINISTRATIVE | Facility: OTHER | Age: 67
End: 2022-08-22

## 2022-08-22 NOTE — TELEPHONE ENCOUNTER
----- Message from Adenike Matias sent at 8/19/2022  3:30 PM EDT -----  Regarding: diabetic eye exam    Hello, our patient attached above has had Diabetic Eye Exam completed/performed  Please assist in updating the patient chart by pulling the document from the Media Tab  The date of service is 5/5/2022       Thank you,  Halima Barry

## 2022-08-22 NOTE — TELEPHONE ENCOUNTER
Upon review of the In Basket request we were able to locate, review, and update the patient chart as requested for Diabetic Eye Exam     Any additional questions or concerns should be emailed to the Practice Liaisons via [email or web address] email, please do not reply via In Basket      Thank you  eDlvis Astudillo

## 2022-09-24 DIAGNOSIS — E08.65 DIABETES MELLITUS DUE TO UNDERLYING CONDITION, UNCONTROLLED, WITH HYPERGLYCEMIA (HCC): ICD-10-CM

## 2022-09-24 RX ORDER — INSULIN GLARGINE 100 [IU]/ML
35 INJECTION, SOLUTION SUBCUTANEOUS
Qty: 15 ML | Refills: 3 | Status: SHIPPED | OUTPATIENT
Start: 2022-09-24 | End: 2022-12-01 | Stop reason: SDUPTHER

## 2022-11-23 DIAGNOSIS — E08.65 DIABETES MELLITUS DUE TO UNDERLYING CONDITION, UNCONTROLLED, WITH HYPERGLYCEMIA (HCC): ICD-10-CM

## 2022-11-23 RX ORDER — METFORMIN HYDROCHLORIDE 500 MG/1
2000 TABLET, EXTENDED RELEASE ORAL DAILY
Qty: 360 TABLET | Refills: 1 | Status: SHIPPED | OUTPATIENT
Start: 2022-11-23

## 2022-12-01 DIAGNOSIS — E08.65 DIABETES MELLITUS DUE TO UNDERLYING CONDITION, UNCONTROLLED, WITH HYPERGLYCEMIA (HCC): ICD-10-CM

## 2022-12-01 RX ORDER — INSULIN GLARGINE 100 [IU]/ML
35 INJECTION, SOLUTION SUBCUTANEOUS
Qty: 15 ML | Refills: 0 | Status: SHIPPED | OUTPATIENT
Start: 2022-12-01

## 2022-12-01 NOTE — TELEPHONE ENCOUNTER
Medication Refill Request     Name  Ashly  Dose/Frequency 35 units qhs  Quantity  15mL  Verified pharmacy   [x]  Verified ordering Provider   [x]  Does patient have enough for the next 3 days?  Yes [x] No []

## 2023-01-04 DIAGNOSIS — Z79.4 CONTROLLED TYPE 2 DIABETES MELLITUS WITHOUT COMPLICATION, WITH LONG-TERM CURRENT USE OF INSULIN (HCC): Primary | ICD-10-CM

## 2023-01-04 DIAGNOSIS — E11.9 CONTROLLED TYPE 2 DIABETES MELLITUS WITHOUT COMPLICATION, WITH LONG-TERM CURRENT USE OF INSULIN (HCC): Primary | ICD-10-CM

## 2023-01-09 ENCOUNTER — APPOINTMENT (OUTPATIENT)
Dept: LAB | Facility: CLINIC | Age: 68
End: 2023-01-09

## 2023-01-09 DIAGNOSIS — Z79.4 CONTROLLED TYPE 2 DIABETES MELLITUS WITHOUT COMPLICATION, WITH LONG-TERM CURRENT USE OF INSULIN (HCC): ICD-10-CM

## 2023-01-09 DIAGNOSIS — E11.9 CONTROLLED TYPE 2 DIABETES MELLITUS WITHOUT COMPLICATION, WITH LONG-TERM CURRENT USE OF INSULIN (HCC): ICD-10-CM

## 2023-01-09 LAB
ALBUMIN SERPL BCP-MCNC: 4 G/DL (ref 3.5–5)
ALP SERPL-CCNC: 219 U/L (ref 46–116)
ALT SERPL W P-5'-P-CCNC: 28 U/L (ref 12–78)
ANION GAP SERPL CALCULATED.3IONS-SCNC: 11 MMOL/L (ref 4–13)
AST SERPL W P-5'-P-CCNC: 27 U/L (ref 5–45)
BASOPHILS # BLD AUTO: 0.05 THOUSANDS/ÂΜL (ref 0–0.1)
BASOPHILS NFR BLD AUTO: 1 % (ref 0–1)
BILIRUB SERPL-MCNC: 1.78 MG/DL (ref 0.2–1)
BUN SERPL-MCNC: 25 MG/DL (ref 5–25)
CALCIUM SERPL-MCNC: 9.8 MG/DL (ref 8.3–10.1)
CHLORIDE SERPL-SCNC: 107 MMOL/L (ref 96–108)
CHOLEST SERPL-MCNC: 96 MG/DL
CO2 SERPL-SCNC: 21 MMOL/L (ref 21–32)
CREAT SERPL-MCNC: 1.05 MG/DL (ref 0.6–1.3)
CREAT UR-MCNC: 159 MG/DL
EOSINOPHIL # BLD AUTO: 0.15 THOUSAND/ÂΜL (ref 0–0.61)
EOSINOPHIL NFR BLD AUTO: 2 % (ref 0–6)
ERYTHROCYTE [DISTWIDTH] IN BLOOD BY AUTOMATED COUNT: 14.6 % (ref 11.6–15.1)
GFR SERPL CREATININE-BSD FRML MDRD: 73 ML/MIN/1.73SQ M
GLUCOSE P FAST SERPL-MCNC: 105 MG/DL (ref 65–99)
HCT VFR BLD AUTO: 45.6 % (ref 36.5–49.3)
HDLC SERPL-MCNC: 33 MG/DL
HGB BLD-MCNC: 15.2 G/DL (ref 12–17)
IMM GRANULOCYTES # BLD AUTO: 0.02 THOUSAND/UL (ref 0–0.2)
IMM GRANULOCYTES NFR BLD AUTO: 0 % (ref 0–2)
LDLC SERPL CALC-MCNC: 42 MG/DL (ref 0–100)
LYMPHOCYTES # BLD AUTO: 1.92 THOUSANDS/ÂΜL (ref 0.6–4.47)
LYMPHOCYTES NFR BLD AUTO: 31 % (ref 14–44)
MCH RBC QN AUTO: 33.3 PG (ref 26.8–34.3)
MCHC RBC AUTO-ENTMCNC: 33.3 G/DL (ref 31.4–37.4)
MCV RBC AUTO: 100 FL (ref 82–98)
MICROALBUMIN UR-MCNC: 36.2 MG/L (ref 0–20)
MICROALBUMIN/CREAT 24H UR: 23 MG/G CREATININE (ref 0–30)
MONOCYTES # BLD AUTO: 0.58 THOUSAND/ÂΜL (ref 0.17–1.22)
MONOCYTES NFR BLD AUTO: 9 % (ref 4–12)
NEUTROPHILS # BLD AUTO: 3.55 THOUSANDS/ÂΜL (ref 1.85–7.62)
NEUTS SEG NFR BLD AUTO: 57 % (ref 43–75)
NRBC BLD AUTO-RTO: 0 /100 WBCS
PMV BLD AUTO: 11.1 FL (ref 8.9–12.7)
POTASSIUM SERPL-SCNC: 4.3 MMOL/L (ref 3.5–5.3)
PROT SERPL-MCNC: 8.3 G/DL (ref 6.4–8.4)
RBC # BLD AUTO: 4.57 MILLION/UL (ref 3.88–5.62)
SODIUM SERPL-SCNC: 139 MMOL/L (ref 135–147)
TRIGL SERPL-MCNC: 103 MG/DL
TSH SERPL DL<=0.05 MIU/L-ACNC: 1.84 UIU/ML (ref 0.45–4.5)
WBC # BLD AUTO: 6.27 THOUSAND/UL (ref 4.31–10.16)

## 2023-01-11 LAB
EST. AVERAGE GLUCOSE BLD GHB EST-MCNC: 255 MG/DL
HBA1C MFR BLD: 10.5 %

## 2023-01-22 DIAGNOSIS — I10 HYPERTENSION, ESSENTIAL, BENIGN: ICD-10-CM

## 2023-01-23 RX ORDER — METOPROLOL TARTRATE 100 MG/1
TABLET ORAL
Qty: 180 TABLET | Refills: 0 | Status: SHIPPED | OUTPATIENT
Start: 2023-01-23

## 2023-03-20 ENCOUNTER — RA CDI HCC (OUTPATIENT)
Dept: OTHER | Facility: HOSPITAL | Age: 68
End: 2023-03-20

## 2023-03-20 NOTE — PROGRESS NOTES
[id number]  [facility]  coding opportunities          Chart Reviewed number of suggestions sent to Provider: 1     Patients Insurance     Medicare Insurance: [employer]

## 2023-03-27 ENCOUNTER — OFFICE VISIT (OUTPATIENT)
Dept: INTERNAL MEDICINE CLINIC | Facility: CLINIC | Age: 68
End: 2023-03-27

## 2023-03-27 VITALS
SYSTOLIC BLOOD PRESSURE: 160 MMHG | OXYGEN SATURATION: 99 % | HEIGHT: 68 IN | HEART RATE: 82 BPM | DIASTOLIC BLOOD PRESSURE: 90 MMHG | BODY MASS INDEX: 29.7 KG/M2 | WEIGHT: 196 LBS

## 2023-03-27 DIAGNOSIS — F17.200 CURRENT SMOKER: ICD-10-CM

## 2023-03-27 DIAGNOSIS — E11.65 POORLY CONTROLLED DIABETES MELLITUS (HCC): ICD-10-CM

## 2023-03-27 DIAGNOSIS — K31.89 PORTAL HYPERTENSIVE GASTROPATHY (HCC): ICD-10-CM

## 2023-03-27 DIAGNOSIS — E78.5 DYSLIPIDEMIA: ICD-10-CM

## 2023-03-27 DIAGNOSIS — Z13.6 SCREENING FOR AAA (ABDOMINAL AORTIC ANEURYSM): ICD-10-CM

## 2023-03-27 DIAGNOSIS — Z00.00 MEDICARE ANNUAL WELLNESS VISIT, INITIAL: Primary | ICD-10-CM

## 2023-03-27 DIAGNOSIS — K76.6 PORTAL HYPERTENSIVE GASTROPATHY (HCC): ICD-10-CM

## 2023-03-27 DIAGNOSIS — I10 BENIGN ESSENTIAL HYPERTENSION: ICD-10-CM

## 2023-03-27 DIAGNOSIS — E55.9 VITAMIN D DEFICIENCY: ICD-10-CM

## 2023-03-27 DIAGNOSIS — F41.1 GENERALIZED ANXIETY DISORDER: ICD-10-CM

## 2023-03-27 PROBLEM — I85.00 ESOPHAGEAL VARICES WITHOUT BLEEDING, UNSPECIFIED ESOPHAGEAL VARICES TYPE (HCC): Status: ACTIVE | Noted: 2023-03-27

## 2023-03-27 PROBLEM — E66.9 OBESITY: Status: RESOLVED | Noted: 2017-05-22 | Resolved: 2023-03-27

## 2023-03-27 PROBLEM — I85.00 ESOPHAGEAL VARICES WITHOUT BLEEDING, UNSPECIFIED ESOPHAGEAL VARICES TYPE (HCC): Status: RESOLVED | Noted: 2023-03-27 | Resolved: 2023-03-27

## 2023-03-27 RX ORDER — INSULIN GLARGINE 100 [IU]/ML
40 INJECTION, SOLUTION SUBCUTANEOUS
Qty: 15 ML | Refills: 11 | Status: SHIPPED | OUTPATIENT
Start: 2023-03-27 | End: 2023-04-03

## 2023-03-27 RX ORDER — METOPROLOL SUCCINATE 100 MG/1
100 TABLET, EXTENDED RELEASE ORAL DAILY
Qty: 30 TABLET | Refills: 11 | Status: SHIPPED | OUTPATIENT
Start: 2023-03-27

## 2023-03-27 RX ORDER — MELATONIN
1000 DAILY
Start: 2023-03-27

## 2023-03-27 NOTE — PATIENT INSTRUCTIONS
Switch metoprolol to extended release so may take once a day  May take metformin once a day since it is extended release          Medicare Preventive Visit Patient Instructions  Thank you for completing your Welcome to Medicare Visit or Medicare Annual Wellness Visit today  Your next wellness visit will be due in one year (3/27/2024)  The screening/preventive services that you may require over the next 5-10 years are detailed below  Some tests may not apply to you based off risk factors and/or age  Screening tests ordered at today's visit but not completed yet may show as past due  Also, please note that scanned in results may not display below  Preventive Screenings:  Service Recommendations Previous Testing/Comments   Colorectal Cancer Screening  Colonoscopy    Fecal Occult Blood Test (FOBT)/Fecal Immunochemical Test (FIT)  Fecal DNA/Cologuard Test  Flexible Sigmoidoscopy Age: 39-70 years old   Colonoscopy: every 10 years (May be performed more frequently if at higher risk)  OR  FOBT/FIT: every 1 year  OR  Cologuard: every 3 years  OR  Sigmoidoscopy: every 5 years  Screening may be recommended earlier than age 39 if at higher risk for colorectal cancer  Also, an individualized decision between you and your healthcare provider will decide whether screening between the ages of 74-80 would be appropriate   Colonoscopy: 10/14/2021  FOBT/FIT: Not on file  Cologuard: 03/16/2021  Sigmoidoscopy: Not on file          Prostate Cancer Screening Individualized decision between patient and health care provider in men between ages of 53-78   Medicare will cover every 12 months beginning on the day after your 50th birthday PSA: 0 2 ng/mL           Hepatitis C Screening Once for adults born between 1945 and 1965  More frequently in patients at high risk for Hepatitis C Hep C Antibody: 05/21/2020        Diabetes Screening 1-2 times per year if you're at risk for diabetes or have pre-diabetes Fasting glucose: 105 mg/dL (1/9/2023)  A1C: 10 5 % (1/9/2023)      Cholesterol Screening Once every 5 years if you don't have a lipid disorder  May order more often based on risk factors  Lipid panel: 01/09/2023         Other Preventive Screenings Covered by Medicare:  Abdominal Aortic Aneurysm (AAA) Screening: covered once if your at risk  You're considered to be at risk if you have a family history of AAA or a male between the age of 73-68 who smoking at least 100 cigarettes in your lifetime  Lung Cancer Screening: covers low dose CT scan once per year if you meet all of the following conditions: (1) Age 50-69; (2) No signs or symptoms of lung cancer; (3) Current smoker or have quit smoking within the last 15 years; (4) You have a tobacco smoking history of at least 20 pack years (packs per day x number of years you smoked); (5) You get a written order from a healthcare provider  Glaucoma Screening: covered annually if you're considered high risk: (1) You have diabetes OR (2) Family history of glaucoma OR (3)  aged 48 and older OR (3)  American aged 72 and older  Osteoporosis Screening: covered every 2 years if you meet one of the following conditions: (1) Have a vertebral abnormality; (2) On glucocorticoid therapy for more than 3 months; (3) Have primary hyperparathyroidism; (4) On osteoporosis medications and need to assess response to drug therapy  HIV Screening: covered annually if you're between the age of 12-76  Also covered annually if you are younger than 13 and older than 72 with risk factors for HIV infection  For pregnant patients, it is covered up to 3 times per pregnancy      Immunizations:  Immunization Recommendations   Influenza Vaccine Annual influenza vaccination during flu season is recommended for all persons aged >= 6 months who do not have contraindications   Pneumococcal Vaccine   * Pneumococcal conjugate vaccine = PCV13 (Prevnar 13), PCV15 (Vaxneuvance), PCV20 (Prevnar 20)  * Pneumococcal polysaccharide vaccine = PPSV23 (Pneumovax) Adults 2364 years old: 1-3 doses may be recommended based on certain risk factors  Adults 72 years old: 1-2 doses may be recommended based off what pneumonia vaccine you previously received   Hepatitis B Vaccine 3 dose series if at intermediate or high risk (ex: diabetes, end stage renal disease, liver disease)   Tetanus (Td) Vaccine - COST NOT COVERED BY MEDICARE PART B Following completion of primary series, a booster dose should be given every 10 years to maintain immunity against tetanus  Td may also be given as tetanus wound prophylaxis  Tdap Vaccine - COST NOT COVERED BY MEDICARE PART B Recommended at least once for all adults  For pregnant patients, recommended with each pregnancy  Shingles Vaccine (Shingrix) - COST NOT COVERED BY MEDICARE PART B  2 shot series recommended in those aged 48 and above     Health Maintenance Due:      Topic Date Due    Colorectal Cancer Screening  10/13/2024    Hepatitis C Screening  Completed     Immunizations Due:      Topic Date Due    Hepatitis A Vaccine (1 of 2 - Risk 2-dose series) Never done    Hepatitis B Vaccine (1 of 3 - Risk 3-dose series) Never done    Pneumococcal Vaccine: 65+ Years (2 - PCV) 11/02/2019    COVID-19 Vaccine (4 - Booster for Pfizer series) 02/14/2022    Influenza Vaccine (1) 09/01/2022     Advance Directives   What are advance directives? Advance directives are legal documents that state your wishes and plans for medical care  These plans are made ahead of time in case you lose your ability to make decisions for yourself  Advance directives can apply to any medical decision, such as the treatments you want, and if you want to donate organs  What are the types of advance directives? There are many types of advance directives, and each state has rules about how to use them  You may choose a combination of any of the following:  Living will: This is a written record of the treatment you want   You can also choose which treatments you do not want, which to limit, and which to stop at a certain time  This includes surgery, medicine, IV fluid, and tube feedings  Critical access hospital power of  for healthcare [facility]): This is a written record that states who you want to make healthcare choices for you when you are unable to make them for yourself  This person, called a proxy, is usually a family member or a friend  You may choose more than 1 proxy  Do not resuscitate (DNR) order:  A DNR order is used in case your heart stops beating or you stop breathing  It is a request not to have certain forms of treatment, such as CPR  A DNR order may be included in other types of advance directives  Medical directive: This covers the care that you want if you are in a coma, near death, or unable to make decisions for yourself  You can list the treatments you want for each condition  Treatment may include pain medicine, surgery, blood transfusions, dialysis, IV or tube feedings, and a ventilator (breathing machine)  Values history: This document has questions about your views, beliefs, and how you feel and think about life  This information can help others choose the care that you would choose  Why are advance directives important? An advance directive helps you control your care  Although spoken wishes may be used, it is better to have your wishes written down  Spoken wishes can be misunderstood, or not followed  Treatments may be given even if you do not want them  An advance directive may make it easier for your family to make difficult choices about your care  Cigarette Smoking and Your Health   Risks to your health if you smoke:  Nicotine and other chemicals found in tobacco damage every cell in your body  Even if you are a light smoker, you have an increased risk for cancer, heart disease, and lung disease  If you are pregnant or have diabetes, smoking increases your risk for complications     Benefits to your health if you stop smoking: You decrease respiratory symptoms such as coughing, wheezing, and shortness of breath  You reduce your risk for cancers of the lung, mouth, throat, kidney, bladder, pancreas, stomach, and cervix  If you already have cancer, you increase the benefits of chemotherapy  You also reduce your risk for cancer returning or a second cancer from developing  You reduce your risk for heart disease, blood clots, heart attack, and stroke  You reduce your risk for lung infections, and diseases such as pneumonia, asthma, chronic bronchitis, and emphysema  Your circulation improves  More oxygen can be delivered to your body  If you have diabetes, you lower your risk for complications, such as kidney, artery, and eye diseases  You also lower your risk for nerve damage  Nerve damage can lead to amputations, poor vision, and blindness  You improve your body's ability to heal and to fight infections  For more information and support to stop smoking:   [employer]  Phone: 5- 922 - [phone]  Web Address: [place]  Weight Management   Why it is important to manage your weight:  Being overweight increases your risk of health conditions such as heart disease, high blood pressure, type 2 diabetes, and certain types of cancer  It can also increase your risk for osteoarthritis, sleep apnea, and other respiratory problems  Aim for a slow, steady weight loss  Even a small amount of weight loss can lower your risk of health problems  How to lose weight safely:  A safe and healthy way to lose weight is to eat fewer calories and get regular exercise  You can lose up about 1 pound a week by decreasing the number of calories you eat by 500 calories each day  Healthy meal plan for weight management:  A healthy meal plan includes a variety of foods, contains fewer calories, and helps you stay healthy  A healthy meal plan includes the following:  Eat whole-grain foods more often    A healthy meal plan should contain fiber  Fiber is the part of grains, fruits, and vegetables that is not broken down by your body  Whole-grain foods are healthy and provide extra fiber in your diet  Some examples of whole-grain foods are whole-wheat breads and pastas, oatmeal, brown rice, and bulgur  Eat a variety of vegetables every day  Include dark, leafy greens such as spinach, kale, jennifer greens, and mustard greens  Eat yellow and orange vegetables such as carrots, sweet potatoes, and winter squash  Eat a variety of fruits every day  Choose fresh or canned fruit (canned in its own juice or light syrup) instead of juice  Fruit juice has very little or no fiber  Eat low-fat dairy foods  Drink fat-free (skim) milk or 1% milk  Eat fat-free yogurt and low-fat cottage cheese  Try low-fat cheeses such as mozzarella and other reduced-fat cheeses  Choose meat and other protein foods that are low in fat  Choose beans or other legumes such as split peas or lentils  Choose fish, skinless poultry (chicken or turkey), or lean cuts of red meat (beef or pork)  Before you cook meat or poultry, cut off any visible fat  Use less fat and oil  Try baking foods instead of frying them  Add less fat, such as margarine, sour cream, regular salad dressing and mayonnaise to foods  Eat fewer high-fat foods  Some examples of high-fat foods include french fries, doughnuts, ice cream, and cakes  Eat fewer sweets  Limit foods and drinks that are high in sugar  This includes candy, cookies, regular soda, and sweetened drinks  Exercise:  Exercise at least 30 minutes per day on most days of the week  Some examples of exercise include walking, biking, dancing, and swimming  You can also fit in more physical activity by taking the stairs instead of the elevator or parking farther away from stores  Ask your healthcare provider about the best exercise plan for you        © Copyright [employer] 2018 Information is for End User's use only and may not be sold, redistributed or otherwise used for commercial purposes   All illustrations and images included in CareNotes® are the copyrighted property of [employer]  or [facility] Kevin Rolle

## 2023-03-27 NOTE — PROGRESS NOTES
Diabetic Foot Exam    Patient's shoes and socks removed  Right Foot/Ankle   Right Foot Inspection  Skin Exam: skin normal and skin intact  No dry skin, no warmth, no callus, no erythema, no maceration, no abnormal color, no pre-ulcer, no ulcer and no callus  Toe Exam: ROM and strength within normal limits  Sensory   Monofilament testing: intact    Vascular  Capillary refills: < 3 seconds  The right DP pulse is 2+  Left Foot/Ankle  Left Foot Inspection  Skin Exam: skin normal and skin intact  No dry skin, no warmth, no erythema, no maceration, normal color, no pre-ulcer, no ulcer and no callus  Toe Exam: ROM and strength within normal limits  Sensory   Monofilament testing: intact    Vascular  Capillary refills: < 3 seconds  The left DP pulse is 2+       Assign Risk Category  No deformity present  No loss of protective sensation  No weak pulses  Risk: 0     Assessment and Plan:     Problem List Items Addressed This Visit        Digestive    Portal hypertensive gastropathy ([facility] )     EGD in 2021, normal esophagus  He has occasional heartburn, does not take any medications            Endocrine    Poorly controlled diabetes mellitus ([facility] )     Missing metformin  Discussed that he can take all the 4 tablets 2g once a day with a meal even if it is later in the day  Lab Results   Component Value Date    HGBA1C 10 5 (H) 01/09/2023            Relevant Medications    metoprolol succinate (Toprol XL) 100 mg 24 hr tablet    Empagliflozin (Jardiance) 10 MG TABS tablet    Insulin Glargine Solostar (Lantus SoloStar) 100 UNIT/ML SOPN    Other Relevant Orders    Comprehensive metabolic panel    Hemoglobin A1c (w/out EAG) ([facility] ONLY)       Cardiovascular and Mediastinum    Benign essential hypertension     Switch to metoprolol XL to improve compliance         Relevant Medications    metoprolol succinate (Toprol XL) 100 mg 24 hr tablet       Other    Dyslipidemia    Vitamin D deficiency     Start D 1000 IU daily Relevant Medications    cholecalciferol (VITAMIN D3) 1,000 units tablet    Generalized anxiety disorder     Stable on Lexapro         Current smoker     Declines lung cancer screening CT        Other Visit Diagnoses     Medicare annual wellness visit, initial    -  Primary    Screening for AAA (abdominal aortic aneurysm)        Relevant Orders    US abdominal aorta screening aaa        BMI Counseling: Body mass index is 29 8 kg/m²  The BMI is above normal  Nutrition recommendations include consuming healthier snacks, moderation in carbohydrate intake and reducing intake of saturated and trans fat  Rationale for BMI follow-up plan is due to patient being overweight or obese  Depression Screening and Follow-up Plan: Patient was screened for depression during today's encounter  They screened negative with a PHQ-2 score of 0  Preventive health issues were discussed with patient, and age appropriate screening tests were ordered as noted in patient's After Visit Summary  Personalized health advice and appropriate referrals for health education or preventive services given if needed, as noted in patient's After Visit Summary       History of Present Illness:     Patient presents for a Medicare Wellness Visit    HPI   Patient Care Team:  Dar Cruz MD as PCP - Sandor Jackson MD as PCP - PCP-[facility]-PRANA[facility] [place], Pharmacist as Pharmacist (Pharmacy)     Review of Systems:     Review of Systems     Problem List:     Patient Active Problem List   Diagnosis   • Benign essential hypertension   • Vitamin D deficiency   • Dyslipidemia   • Chronic pain of right knee   • Generalized anxiety disorder   • Current smoker   • Insulin long-term use ([facility] )   • Poorly controlled diabetes mellitus ([facility] )   • Gall stones   • Cirrhosis of liver with ascites ([facility] )   • Right renal stone   • Portal hypertensive gastropathy [facility])      Past Medical and Surgical History: Past Medical History:   Diagnosis Date   • Abnormal liver function test     LAST ASSESSED: [id number]   • Anxiety    • Athlete's foot     LAST ASSESSED: [id number]   • Callus     LAST ASSESSED: [id number]   • Carpal tunnel syndrome, unspecified upper limb     LAST ASSESSED: [id number]   • Chronic pain syndrome     LAST ASSESSED: 22OCT2013   • Colon polyp    • Diabetes mellitus ([facility] )    • Diabetes mellitus due to underlying condition, uncontrolled, with hypergly[facility] 7/25/2016    Transitioned From: Diabetes mellitus type 2, uncontrolled   • Esophageal varices without bleeding, unspecified esophageal varices type ([facility] ) 3/27/2023   • Herpes zoster ophthalmicus of right eye     LAST ASSESSED: [id number]   • Hyperglycemia     LAST ASSESSED: 70SDV1042   • Hyperplastic colon polyp    • Obesity 5/22/2017   • Pericardial effusion     LAST ASSESSED: [id number]   • Positive colorectal cancer screening using Cologuard test 6/8/2021   • Right lower quadrant abdominal pain 6/8/2021     Past Surgical History:   Procedure Laterality Date   • COLONOSCOPY     • HEMORROIDECTOMY     • LUMBAR LAMINECTOMY      LAST ASSESSED: [id number]   • TONSILLECTOMY AND ADENOIDECTOMY     • WISDOM TOOTH EXTRACTION        Family History:     Family History   Problem Relation Age of Onset   • No Known Problems Father    • Hypotension Family    • Prostate cancer Family    • Coronary artery disease Family    • Diabetes Family    • Diverticulitis Mother    • Breast cancer Mother       Social History:     Social History     Socioeconomic History   • Marital status: Single     Spouse name: None   • Number of children: None   • Years of education: None   • Highest education level: None   Occupational History   • None   Tobacco Use   • Smoking status: Every Day     Packs/day: 0 50     Types: Cigarettes   • Smokeless tobacco: Never   Vaping Use   • Vaping Use: Never used   Substance and Sexual Activity   • Alcohol use: Yes     Comment: rarey   • Drug use:  No • Sexual activity: None   Other Topics Concern   • None   Social History Narrative   • None     Social Determinants of Health     Financial Resource Strain: Not on file   Food Insecurity: Not on file   Transportation Needs: Not on file   Physical Activity: Not on file   Stress: Not on file   Social Connections: Not on file   Intimate Partner Violence: Not on file   Housing Stability: Not on file      Medications and Allergies:     Current Outpatient Medications   Medication Sig Dispense Refill   • atorvastatin (LIPITOR) 20 mg tablet TAKE 1 TABLET BY MOUTH EVERY DAY 90 tablet 3   • B-D UF III MINI PEN NEEDLES 31G X 5 MM MISC BY DOES NOT APPLY ROUTE DAILY TO INJECT INSULIN 100 each 6   • cholecalciferol (VITAMIN D3) 1,000 units tablet Take 1 tablet (1,000 Units total) by mouth daily     • Empagliflozin (Jardiance) 10 MG TABS tablet Take 1 tablet (10 mg total) by mouth daily 30 tablet 11   • escitalopram (LEXAPRO) 20 mg tablet TAKE 1 TABLET BY MOUTH EVERY DAY 90 tablet 0   • glucose blood (OneTouch Ultra) test strip Use 1 each 3 (three) times a day Use as instructed 300 strip 3   • Insulin Glargine Solostar (Lantus SoloStar) 100 UNIT/ML SOPN Inject 0 4 mL (40 Units total) under the skin daily at bedtime 15 mL 11   • Lancets (OneTouch Delica Plus XVRCKB12M) MISC TEST DAILY 100 each 1   • metFORMIN (GLUCOPHAGE-XR) 500 mg 24 hr tablet TAKE 4 TABLETS (2,000 MG TOTAL) BY MOUTH DAILY FOR DIABETES 360 tablet 1   • metoprolol succinate (Toprol XL) 100 mg 24 hr tablet Take 1 tablet (100 mg total) by mouth daily 30 tablet 11     No current facility-administered medications for this visit  Allergies   Allergen Reactions   • Duloxetine Hcl Other (See Comments)   • Lisinopril Cough   • Olmesartan Medoxomil-Hctz      Annotation - [id number]:  Abnormal blood levels due to med      Immunizations:     Immunization History   Administered Date(s) Administered   • COVID-19 PFIZER VACCINE 0 3 ML IM 04/14/2021, 05/06/2021, 12/20/2021 "  • Influenza Quadrivalent Preservative Free 3 years and older IM 12/29/2015   • Influenza, recombinant, quadrivalent,injectable, preservative free 11/02/2018   • Influenza, seasonal, injectable 10/02/2012, 05/02/2013, 12/18/2013   • Pneumococcal Polysaccharide PPV23 11/02/2018   • Td (adult), adsorbed 1955   • Tdap 04/10/2019      Health Maintenance:         Topic Date Due   • Colorectal Cancer Screening  10/13/2024   • Hepatitis C Screening  Completed         Topic Date Due   • Hepatitis A Vaccine (1 of 2 - Risk 2-dose series) Never done   • Hepatitis B Vaccine (1 of 3 - Risk 3-dose series) Never done   • COVID-19 Vaccine (4 - Booster for Pfizer series) 02/14/2022      Medicare Screening Tests and Risk Assessments:     Annual Wellness Visit  No results found  Physical Exam:     /90 (BP Location: Left arm, Patient Position: Sitting, Cuff Size: Standard)   Pulse 82   Ht 5' 8\" (1 727 m)   Wt 88 9 kg (196 lb)   SpO2 99%   BMI 29 80 kg/m²     Physical Exam  Cardiovascular:      Pulses: no weak pulses          Dorsalis pedis pulses are 2+ on the right side and 2+ on the left side  Feet:      Right foot:      Skin integrity: No ulcer, skin breakdown, erythema, warmth, callus or dry skin  Left foot:      Skin integrity: No ulcer, skin breakdown, erythema, warmth, callus or dry skin            Chris Coronel MD  "

## 2023-03-27 NOTE — PROGRESS NOTES
Demi Quijano is here for his Initial Wellness visit  Health Risk Assessment:   Patient rates overall health as fair  Patient feels that their physical health rating is slightly better  Patient is satisfied with their life  Eyesight was rated as same  Hearing was rated as same  Patient feels that their emotional and mental health rating is same  Patients states they are sometimes angry  Patient states they are sometimes unusually tired/fatigued  Pain experienced in the last 7 days has been none  Patient states that he has experienced weight loss or gain in last 6 months  Depression Screening:   PHQ-2 Score: 0      Fall Risk Screening: In the past year, patient has experienced: no history of falling in past year      Home Safety:  Patient does not have trouble with stairs inside or outside of their home  Patient has working smoke alarms and has working carbon monoxide detector  Home safety hazards include: none  Nutrition:   Current diet is Diabetic  Medications:   Patient is not currently taking any over-the-counter supplements  Patient is able to manage medications  Activities of Daily Living (ADLs)/Instrumental Activities of Daily Living (IADLs):   Walk and transfer into and out of bed and chair?: Yes  Dress and groom yourself?: Yes    Bathe or shower yourself?: Yes    Feed yourself? Yes  Do your laundry/housekeeping?: Yes  Manage your money, pay your bills and track your expenses?: Yes  Make your own meals?: Yes    Do your own shopping?: Yes    Previous Hospitalizations:   Any hospitalizations or ED visits within the last 12 months?: No      Advance Care Planning:   Living will: No    Durable POA for healthcare:  Yes    Advanced directive: No      Cognitive Screening:   Provider or family/friend/caregiver concerned regarding cognition?: No    PREVENTIVE SCREENINGS      Cardiovascular Screening:    General: Screening Current      Diabetes Screening:     General: Screening Not Indicated and History Diabetes      Colorectal Cancer Screening:     General: Screening Current      Prostate Cancer Screening:      Due for: PSA      Abdominal Aortic Aneurysm (AAA) Screening:    Risk factors include: age between 73-67 yo and tobacco use        General: Risks and Benefits Discussed    Due for: Screening AAA Ultrasound      Lung Cancer Screening:     General: Patient Declines      Hepatitis C Screening:    General: Screening Current    Screening, Brief Intervention, and Referral to Treatment (SBIRT)    Screening      Single Item Drug Screening:  How often have you used an illegal drug (including marijuana) or a prescription medication for non-medical reasons in the past year? never    Single Item Drug Screen Score: 0  Interpretation: Negative screen for possible drug use disorder  Review of Systems   Constitutional: Negative for unexpected weight change  Respiratory: Negative for shortness of breath  Cardiovascular: Negative for chest pain and palpitations  Gastrointestinal: Negative for abdominal pain, blood in stool, constipation and diarrhea  Endocrine: Negative for polydipsia and polyuria  Genitourinary: Negative for difficulty urinating  Physical Exam  Constitutional:       Appearance: He is ill-appearing  Cardiovascular:      Rate and Rhythm: Regular rhythm  Heart sounds: Normal heart sounds  Pulmonary:      Breath sounds: Normal breath sounds  Abdominal:      Palpations: There is no mass  Musculoskeletal:      Right lower leg: No edema  Left lower leg: No edema     Psychiatric:         Mood and Affect: Mood normal          Behavior: Behavior normal

## 2023-03-27 NOTE — ASSESSMENT & PLAN NOTE
Missing metformin  Discussed that he can take all the 4 tablets 2g once a day with a meal even if it is later in the day  Lab Results   Component Value Date    HGBA1C 10 5 (H) 01/09/2023

## 2023-03-30 DIAGNOSIS — E11.65 POORLY CONTROLLED DIABETES MELLITUS (HCC): Primary | ICD-10-CM

## 2023-03-30 NOTE — TELEPHONE ENCOUNTER
Lantus not covered by insurance per review, generic insulin degludec is preferred basal insulin  Order pended  Given last a1c, may benefit from pharm follow-up mid May to assist with blood sugar monitoring   If interested in pharm referral, let me know and I can call patient to schedule appointment     Pharmacist Tracking Tool  Reason For Outreach: Embedded Pharmacist  Demographics:  Intervention Method: Chart Review  Type of Intervention: New  Topics Addressed: Diabetes  Pharmacologic Interventions: Medication Conversion  Non-Pharmacologic Interventions: N/A  Time:  Direct Patient Care: 0 mins  Care Coordination: 10 mins  Recommendation Recipient: Provider  Outcome: Accepted

## 2023-04-02 DIAGNOSIS — F41.9 ANXIETY: ICD-10-CM

## 2023-04-02 RX ORDER — ESCITALOPRAM OXALATE 20 MG/1
TABLET ORAL
Qty: 90 TABLET | Refills: 0 | Status: SHIPPED | OUTPATIENT
Start: 2023-04-02

## 2023-04-03 DIAGNOSIS — E11.65 POORLY CONTROLLED DIABETES MELLITUS (HCC): Primary | ICD-10-CM

## 2023-04-03 RX ORDER — INSULIN GLARGINE 300 U/ML
40 INJECTION, SOLUTION SUBCUTANEOUS DAILY
Qty: 4.5 ML | Refills: 2 | Status: SHIPPED | OUTPATIENT
Start: 2023-04-03 | End: 2023-04-27

## 2023-04-30 DIAGNOSIS — Z79.4 CONTROLLED TYPE 2 DIABETES MELLITUS WITHOUT COMPLICATION, WITH LONG-TERM CURRENT USE OF INSULIN (HCC): ICD-10-CM

## 2023-04-30 DIAGNOSIS — E11.9 CONTROLLED TYPE 2 DIABETES MELLITUS WITHOUT COMPLICATION, WITH LONG-TERM CURRENT USE OF INSULIN (HCC): ICD-10-CM

## 2023-04-30 DIAGNOSIS — E78.5 DYSLIPIDEMIA: ICD-10-CM

## 2023-04-30 RX ORDER — ATORVASTATIN CALCIUM 20 MG/1
TABLET, FILM COATED ORAL
Qty: 90 TABLET | Refills: 3 | Status: SHIPPED | OUTPATIENT
Start: 2023-04-30

## 2023-05-07 DIAGNOSIS — F41.9 ANXIETY: ICD-10-CM

## 2023-05-07 RX ORDER — ESCITALOPRAM OXALATE 20 MG/1
TABLET ORAL
Qty: 90 TABLET | Refills: 0 | Status: SHIPPED | OUTPATIENT
Start: 2023-05-07

## 2023-06-07 ENCOUNTER — ESTABLISHED COMPREHENSIVE EXAM (OUTPATIENT)
Dept: URBAN - METROPOLITAN AREA CLINIC 6 | Facility: CLINIC | Age: 68
End: 2023-06-07

## 2023-06-07 DIAGNOSIS — H25.813: ICD-10-CM

## 2023-06-07 DIAGNOSIS — E11.9: ICD-10-CM

## 2023-06-07 DIAGNOSIS — Z79.4: ICD-10-CM

## 2023-06-07 PROCEDURE — 92014 COMPRE OPH EXAM EST PT 1/>: CPT

## 2023-06-07 ASSESSMENT — TONOMETRY
OS_IOP_MMHG: 12
OD_IOP_MMHG: 11

## 2023-06-07 ASSESSMENT — VISUAL ACUITY
OD_SC: 20/25-2
OS_SC: 20/25-2

## 2023-07-06 ENCOUNTER — RA CDI HCC (OUTPATIENT)
Dept: OTHER | Facility: HOSPITAL | Age: 68
End: 2023-07-06

## 2023-07-06 NOTE — PROGRESS NOTES
[address] [facility] coding opportunities       Chart reviewed, no opportunity found: CHART REVIEWED, NO OPPORTUNITY FOUND        Patients Insurance     Medicare Insurance: Medicare

## 2023-10-25 NOTE — ASSESSMENT & PLAN NOTE
Controlled on atorvastatin which he takes every other day Pt presents to the ED via private vehicle with mom and dad.  Mom states patient has not urinated for the past 14 hours.

## 2023-11-13 ENCOUNTER — VBI (OUTPATIENT)
Dept: ADMINISTRATIVE | Facility: OTHER | Age: 68
End: 2023-11-13

## 2023-12-22 DIAGNOSIS — E08.65 DIABETES MELLITUS DUE TO UNDERLYING CONDITION, UNCONTROLLED, WITH HYPERGLYCEMIA (HCC): ICD-10-CM

## 2023-12-22 RX ORDER — METFORMIN HYDROCHLORIDE 500 MG/1
2000 TABLET, EXTENDED RELEASE ORAL DAILY
Qty: 360 TABLET | Refills: 0 | Status: CANCELLED | OUTPATIENT
Start: 2023-12-22

## 2023-12-22 RX ORDER — METFORMIN HYDROCHLORIDE 500 MG/1
2000 TABLET, EXTENDED RELEASE ORAL DAILY
Qty: 360 TABLET | Refills: 0 | Status: SHIPPED | OUTPATIENT
Start: 2023-12-22

## 2023-12-22 NOTE — TELEPHONE ENCOUNTER
Patient needs updated blood work and has previously placed orders. Please contact patient to go for labs. Courtesy refill given

## 2024-01-24 ENCOUNTER — RA CDI HCC (OUTPATIENT)
Dept: OTHER | Facility: HOSPITAL | Age: 69
End: 2024-01-24

## 2024-02-09 ENCOUNTER — NURSE TRIAGE (OUTPATIENT)
Dept: OTHER | Facility: OTHER | Age: 69
End: 2024-02-09

## 2024-02-09 DIAGNOSIS — E11.65 POORLY CONTROLLED DIABETES MELLITUS (HCC): ICD-10-CM

## 2024-02-09 RX ORDER — METOPROLOL SUCCINATE 100 MG/1
100 TABLET, EXTENDED RELEASE ORAL DAILY
Qty: 7 TABLET | Refills: 0 | Status: SHIPPED | OUTPATIENT
Start: 2024-02-09 | End: 2024-02-12 | Stop reason: SDUPTHER

## 2024-02-10 NOTE — TELEPHONE ENCOUNTER
"Regarding: refill of bp med  ----- Message from Tammy Arcos sent at 2/9/2024  7:29 PM EST -----  \"I called earlier but my medicine isn't at the pharmacy. It's the metoprolol tartrate\"    "

## 2024-02-10 NOTE — TELEPHONE ENCOUNTER
"Reason for Disposition  • [1] Caller requesting a prescription renewal (no refills left), no triage required, AND [2] triager able to renew prescription per department policy    Answer Assessment - Initial Assessment Questions  1. DRUG NAME: \"What medicine do you need to have refilled?\"   Metoprolol 100 mG QD  2. REFILLS REMAINING: \"How many refills are remaining?\" (Note: The label on the medicine or pill bottle will show how many refills are remaining. If there are no refills remaining, then a renewal may be needed.)    Zero   3. EXPIRATION DATE: \"What is the expiration date?\" (Note: The label states when the prescription will , and thus can no longer be refilled.)    zero   4. PRESCRIBING HCP: \"Who prescribed it?\" Reason: If prescribed by specialist, call should be referred to that group.  PCP  5. SYMPTOMS: \"Do you have any symptoms?\"     Denies    Protocols used: Medication Refill and Renewal Call-ADULT-    "

## 2024-02-12 ENCOUNTER — TELEPHONE (OUTPATIENT)
Dept: INTERNAL MEDICINE CLINIC | Facility: CLINIC | Age: 69
End: 2024-02-12

## 2024-02-12 DIAGNOSIS — E11.65 POORLY CONTROLLED DIABETES MELLITUS (HCC): ICD-10-CM

## 2024-02-12 RX ORDER — METOPROLOL SUCCINATE 100 MG/1
100 TABLET, EXTENDED RELEASE ORAL DAILY
Qty: 90 TABLET | Refills: 0 | Status: SHIPPED | OUTPATIENT
Start: 2024-02-12

## 2024-02-12 NOTE — TELEPHONE ENCOUNTER
Patient called would like office to call him. Would like to schedule an appointment with Dr Reyes. Please call patient at [phone]

## 2024-02-12 NOTE — TELEPHONE ENCOUNTER
Reason for call:   [x] Refill   [] Prior Auth  [] Other:     Office:   [x] PCP/Provider - Reyes,Lea  [] Specialty/Provider -     Medication: Metoprolol XL     Dose/Frequency: 100 mg Q Daily    Quantity: 90    Pharmacy: [facility] [place] [address]     Does the patient have enough for 3 days?   [x] Yes   [] No - Send as HP to POD  I am sending message to office Patient wants to schedule an appointment

## 2024-02-17 ENCOUNTER — HOSPITAL ENCOUNTER (EMERGENCY)
Facility: HOSPITAL | Age: 69
Discharge: HOME | End: 2024-02-17
Attending: EMERGENCY MEDICINE
Payer: COMMERCIAL

## 2024-02-17 VITALS
DIASTOLIC BLOOD PRESSURE: 78 MMHG | SYSTOLIC BLOOD PRESSURE: 145 MMHG | TEMPERATURE: 98 F | HEART RATE: 90 BPM | OXYGEN SATURATION: 98 % | RESPIRATION RATE: 14 BRPM

## 2024-02-17 DIAGNOSIS — R31.9 HEMATURIA: ICD-10-CM

## 2024-02-17 DIAGNOSIS — N39.0 UTI (URINARY TRACT INFECTION): Primary | ICD-10-CM

## 2024-02-17 LAB
ALBUMIN SERPL BCP-MCNC: 3.6 G/DL (ref 3.5–5)
ALP SERPL-CCNC: 270 U/L (ref 34–104)
ALT SERPL W P-5'-P-CCNC: 11 U/L (ref 7–52)
ANION GAP SERPL CALCULATED.3IONS-SCNC: 9 MMOL/L
AST SERPL W P-5'-P-CCNC: 15 U/L (ref 13–39)
BACTERIA UR QL AUTO: ABNORMAL /HPF
BASOPHILS # BLD AUTO: 0.02 THOUSANDS/ÂΜL (ref 0–0.1)
BASOPHILS NFR BLD AUTO: 0 % (ref 0–1)
BILIRUB SERPL-MCNC: 1.79 MG/DL (ref 0.2–1)
BILIRUB UR QL STRIP: NEGATIVE
BUN SERPL-MCNC: 13 MG/DL (ref 5–25)
CALCIUM SERPL-MCNC: 9.3 MG/DL (ref 8.4–10.2)
CHLORIDE SERPL-SCNC: 98 MMOL/L (ref 96–108)
CLARITY UR: ABNORMAL
CO2 SERPL-SCNC: 24 MMOL/L (ref 21–32)
COLOR UR: ABNORMAL
CREAT SERPL-MCNC: 0.84 MG/DL (ref 0.6–1.3)
EOSINOPHIL # BLD AUTO: 0.01 THOUSAND/ÂΜL (ref 0–0.61)
EOSINOPHIL NFR BLD AUTO: 0 % (ref 0–6)
ERYTHROCYTE [DISTWIDTH] IN BLOOD BY AUTOMATED COUNT: 14.3 % (ref 11.6–15.1)
GFR SERPL CREATININE-BSD FRML MDRD: 90 ML/MIN/1.73SQ M
GLUCOSE SERPL-MCNC: 369 MG/DL (ref 65–140)
GLUCOSE SERPL-MCNC: 424 MG/DL (ref 65–140)
GLUCOSE SERPL-MCNC: 476 MG/DL (ref 65–140)
GLUCOSE UR STRIP-MCNC: ABNORMAL MG/DL
HCT VFR BLD AUTO: 40.1 % (ref 36.5–49.3)
HGB BLD-MCNC: 13.4 G/DL (ref 12–17)
HGB UR QL STRIP.AUTO: ABNORMAL
IMM GRANULOCYTES # BLD AUTO: 0.05 THOUSAND/UL (ref 0–0.2)
IMM GRANULOCYTES NFR BLD AUTO: 1 % (ref 0–2)
KETONES UR STRIP-MCNC: ABNORMAL MG/DL
LEUKOCYTE ESTERASE UR QL STRIP: ABNORMAL
LYMPHOCYTES # BLD AUTO: 0.91 THOUSANDS/ÂΜL (ref 0.6–4.47)
LYMPHOCYTES NFR BLD AUTO: 16 % (ref 14–44)
MCH RBC QN AUTO: 33.3 PG (ref 26.8–34.3)
MCHC RBC AUTO-ENTMCNC: 33.4 G/DL (ref 31.4–37.4)
MCV RBC AUTO: 100 FL (ref 82–98)
MONOCYTES # BLD AUTO: 0.48 THOUSAND/ÂΜL (ref 0.17–1.22)
MONOCYTES NFR BLD AUTO: 8 % (ref 4–12)
NEUTROPHILS # BLD AUTO: 4.37 THOUSANDS/ÂΜL (ref 1.85–7.62)
NEUTS SEG NFR BLD AUTO: 75 % (ref 43–75)
NITRITE UR QL STRIP: POSITIVE
NON-SQ EPI CELLS URNS QL MICRO: ABNORMAL /HPF
NRBC BLD AUTO-RTO: 0 /100 WBCS
PH UR STRIP.AUTO: 6 [PH]
PLATELET # BLD AUTO: 88 THOUSANDS/UL (ref 149–390)
PMV BLD AUTO: 10.7 FL (ref 8.9–12.7)
POTASSIUM SERPL-SCNC: 3.9 MMOL/L (ref 3.5–5.3)
PROT SERPL-MCNC: 7.4 G/DL (ref 6.4–8.4)
PROT UR STRIP-MCNC: ABNORMAL MG/DL
RBC # BLD AUTO: 4.02 MILLION/UL (ref 3.88–5.62)
RBC #/AREA URNS AUTO: ABNORMAL /HPF
SODIUM SERPL-SCNC: 131 MMOL/L (ref 135–147)
SP GR UR STRIP.AUTO: 1.03 (ref 1–1.03)
UROBILINOGEN UR STRIP-ACNC: <2 MG/DL
WBC # BLD AUTO: 5.84 THOUSAND/UL (ref 4.31–10.16)
WBC #/AREA URNS AUTO: ABNORMAL /HPF
WBC CLUMPS # UR AUTO: PRESENT /UL

## 2024-02-17 PROCEDURE — 99283 EMERGENCY DEPT VISIT LOW MDM: CPT

## 2024-02-17 PROCEDURE — 87186 SC STD MICRODIL/AGAR DIL: CPT

## 2024-02-17 PROCEDURE — 81001 URINALYSIS AUTO W/SCOPE: CPT

## 2024-02-17 PROCEDURE — 87147 CULTURE TYPE IMMUNOLOGIC: CPT

## 2024-02-17 PROCEDURE — 99284 EMERGENCY DEPT VISIT MOD MDM: CPT | Performed by: EMERGENCY MEDICINE

## 2024-02-17 PROCEDURE — 96366 THER/PROPH/DIAG IV INF ADDON: CPT

## 2024-02-17 PROCEDURE — 96368 THER/DIAG CONCURRENT INF: CPT

## 2024-02-17 PROCEDURE — 85025 COMPLETE CBC W/AUTO DIFF WBC: CPT

## 2024-02-17 PROCEDURE — 80053 COMPREHEN METABOLIC PANEL: CPT

## 2024-02-17 PROCEDURE — 87086 URINE CULTURE/COLONY COUNT: CPT

## 2024-02-17 PROCEDURE — 36415 COLL VENOUS BLD VENIPUNCTURE: CPT

## 2024-02-17 PROCEDURE — 82948 REAGENT STRIP/BLOOD GLUCOSE: CPT

## 2024-02-17 PROCEDURE — 96365 THER/PROPH/DIAG IV INF INIT: CPT

## 2024-02-17 PROCEDURE — 96372 THER/PROPH/DIAG INJ SC/IM: CPT

## 2024-02-17 RX ORDER — CEPHALEXIN 500 MG/1
500 CAPSULE ORAL EVERY 6 HOURS SCHEDULED
Qty: 20 CAPSULE | Refills: 0 | Status: SHIPPED | OUTPATIENT
Start: 2024-02-17 | End: 2024-02-22

## 2024-02-17 RX ORDER — SODIUM CHLORIDE, SODIUM GLUCONATE, SODIUM ACETATE, POTASSIUM CHLORIDE, MAGNESIUM CHLORIDE, SODIUM PHOSPHATE, DIBASIC, AND POTASSIUM PHOSPHATE .53; .5; .37; .037; .03; .012; .00082 G/100ML; G/100ML; G/100ML; G/100ML; G/100ML; G/100ML; G/100ML
1000 INJECTION, SOLUTION INTRAVENOUS ONCE
Status: COMPLETED | OUTPATIENT
Start: 2024-02-17 | End: 2024-02-17

## 2024-02-17 RX ADMIN — INSULIN HUMAN 10 UNITS: 100 INJECTION, SOLUTION PARENTERAL at 17:34

## 2024-02-17 RX ADMIN — CEFTRIAXONE SODIUM 1000 MG: 10 INJECTION, POWDER, FOR SOLUTION INTRAVENOUS at 16:35

## 2024-02-17 RX ADMIN — SODIUM CHLORIDE, SODIUM GLUCONATE, SODIUM ACETATE, POTASSIUM CHLORIDE, MAGNESIUM CHLORIDE, SODIUM PHOSPHATE, DIBASIC, AND POTASSIUM PHOSPHATE 1000 ML: .53; .5; .37; .037; .03; .012; .00082 INJECTION, SOLUTION INTRAVENOUS at 16:35

## 2024-02-17 NOTE — ED PROVIDER NOTES
History  Chief Complaint   Patient presents with    Blood in Urine     Pt states bright red blood in urine x2 days; c/o pain + burning + frequency with urination. Pt states he has noticed some swelling in pelvic/groin area.      67 yo M pPMHx of uncontrolled diabetes, HTN, presents to the ED complaining of 2 days of hematuria, dysuria, frequency and urgency. Endorses small amount of hematuria at the end of stream.  He denies any fevers or chills no penile discharge.  He has never had similar symptoms to this in the past.  He does incidentally note a small bulge she feels in his right groin when he stands up that he began to notice about a month ago.  This bulge is easily reducible when he lays down, is nontender and causes him no pain.  He denies any diarrhea or constipation.  No history of bladder cancer, patient does smoke cigarettes.        Prior to Admission Medications   Prescriptions Last Dose Informant Patient Reported? Taking?   B-D UF III MINI PEN NEEDLES 31G X 5 MM MISC   No No   Sig: BY DOES NOT APPLY ROUTE DAILY TO INJECT INSULIN   Empagliflozin (Jardiance) 10 MG TABS tablet   No No   Sig: Take 1 tablet (10 mg total) by mouth daily   Lancets (OneTouch Delica Plus [id number]) MISC   No No   Sig: TEST DAILY   Toujeo SoloStar 300 units/mL CONCENTRATED U-300 injection pen (1-unit dial)   No No   Sig: INJECT 40 UNITS UNDER THE SKIN DAILY   atorvastatin (LIPITOR) 20 mg tablet   No No   Sig: TAKE 1 TABLET BY MOUTH EVERY DAY   cholecalciferol (VITAMIN D3) 1,000 units tablet   No No   Sig: Take 1 tablet (1,000 Units total) by mouth daily   escitalopram (LEXAPRO) 20 mg tablet   No No   Sig: TAKE 1 TABLET BY MOUTH EVERY DAY   glucose blood (OneTouch Ultra) test strip   No No   Sig: Use 1 each 3 (three) times a day Use as instructed   insulin degludec (TRESIBA) 100 units/mL injection pen   No No   Sig: Inject 40 Units under the skin daily *replaces Lantus*   metFORMIN (GLUCOPHAGE-XR) 500 mg 24 hr tablet   No No    Sig: TAKE 4 TABLETS (2,000 MG TOTAL) BY MOUTH DAILY FOR DIABETES   metoprolol succinate (TOPROL-XL) 100 mg 24 hr tablet   No No   Sig: Take 1 tablet (100 mg total) by mouth daily      Facility-Administered Medications: None       Past Medical History:   Diagnosis Date    Abnormal liver function test     LAST ASSESSED: 13JUN2012    Anxiety     Athlete's foot     LAST ASSESSED: 08MAY2013    Callus     LAST ASSESSED: 08MAY2013    Carpal tunnel syndrome, unspecified upper limb     LAST ASSESSED: 02OCT2012    Chronic pain syndrome     LAST ASSESSED: 22OCT2013    Colon polyp     Diabetes mellitus (HCC)     Diabetes mellitus due to underlying condition, uncontrolled, with hyperglycemia (HCC) 7/25/2016    Transitioned From: Diabetes mellitus type 2, uncontrolled    Esophageal varices without bleeding, unspecified esophageal varices type (HCC) 3/27/2023    Herpes zoster ophthalmicus of right eye     LAST ASSESSED: 10NOV2016    Hyperglycemia     LAST ASSESSED: 08MAY2013    Hyperplastic colon polyp     Obesity 5/22/2017    Pericardial effusion     LAST ASSESSED: 13JUN2012    Positive colorectal cancer screening using Cologuard test 6/8/2021    Right lower quadrant abdominal pain 6/8/2021       Past Surgical History:   Procedure Laterality Date    COLONOSCOPY      HEMORROIDECTOMY      LUMBAR LAMINECTOMY      LAST ASSESSED: 07APR2015    TONSILLECTOMY AND ADENOIDECTOMY      WISDOM TOOTH EXTRACTION         Family History   Problem Relation Age of Onset    No Known Problems Father     Hypotension Family     Prostate cancer Family     Coronary artery disease Family     Diabetes Family     Diverticulitis Mother     Breast cancer Mother      I have reviewed and agree with the history as documented.    E-Cigarette/Vaping    E-Cigarette Use Never User      E-Cigarette/Vaping Substances    Nicotine No     THC No     CBD No     Flavoring No     Other No     Unknown No      Social History     Tobacco Use    Smoking status: Every Day      Current packs/day: 0.50     Types: Cigarettes    Smokeless tobacco: Never   Vaping Use    Vaping status: Never Used   Substance Use Topics    Alcohol use: Yes     Comment: rarey    Drug use: No        Review of Systems   Constitutional:  Negative for chills and fever.   HENT:  Negative for ear pain and sore throat.    Eyes:  Negative for pain and visual disturbance.   Respiratory:  Negative for cough and shortness of breath.    Cardiovascular:  Negative for chest pain and palpitations.   Gastrointestinal:  Negative for abdominal pain and vomiting.   Genitourinary:  Positive for dysuria, hematuria and urgency.   Musculoskeletal:  Negative for arthralgias and back pain.   Skin:  Negative for color change and rash.   Neurological:  Negative for seizures and syncope.   All other systems reviewed and are negative.      Physical Exam  ED Triage Vitals   Temperature Pulse Respirations Blood Pressure SpO2   02/17/24 1408 02/17/24 1408 02/17/24 1408 02/17/24 1408 02/17/24 1408   98 °F (36.7 °C) (!) 109 18 (!) 183/84 99 %      Temp Source Heart Rate Source Patient Position - Orthostatic VS BP Location FiO2 (%)   02/17/24 1408 02/17/24 1408 02/17/24 1638 02/17/24 1638 --   Oral Monitor Lying Right arm       Pain Score       02/17/24 1638       No Pain             Orthostatic Vital Signs  Vitals:    02/17/24 1408 02/17/24 1638   BP: (!) 183/84 145/78   Pulse: (!) 109 90   Patient Position - Orthostatic VS:  Lying       Physical Exam  Vitals and nursing note reviewed.   Constitutional:       General: He is not in acute distress.     Appearance: He is well-developed. He is not ill-appearing.   HENT:      Head: Normocephalic and atraumatic.      Nose: No congestion or rhinorrhea.   Eyes:      Conjunctiva/sclera: Conjunctivae normal.   Cardiovascular:      Rate and Rhythm: Normal rate and regular rhythm.      Heart sounds: No murmur heard.  Pulmonary:      Effort: Pulmonary effort is normal. No respiratory distress.      Breath  sounds: Normal breath sounds.   Abdominal:      Palpations: Abdomen is soft.      Tenderness: There is no abdominal tenderness.   Genitourinary:      Musculoskeletal:         General: No swelling.      Cervical back: Neck supple.   Skin:     General: Skin is warm and dry.      Capillary Refill: Capillary refill takes less than 2 seconds.   Neurological:      General: No focal deficit present.      Mental Status: He is alert.   Psychiatric:         Mood and Affect: Mood normal.         ED Medications  Medications   ceftriaxone (ROCEPHIN) 1 g/50 mL in dextrose IVPB (0 mg Intravenous Stopped 2/17/24 1735)   multi-electrolyte (ISOLYTE-S PH 7.4) bolus 1,000 mL (0 mL Intravenous Stopped 2/17/24 1844)   insulin regular (HumuLIN R,NovoLIN R) injection 10 Units (10 Units Subcutaneous Given 2/17/24 1734)       Diagnostic Studies  Results Reviewed       Procedure Component Value Units Date/Time    Fingerstick Glucose (POCT) [[id number]]  (Abnormal) Collected: 02/17/24 1813    Lab Status: Final result Updated: 02/17/24 1836     POC Glucose 369 mg/dl     Urine Microscopic [[id number]]  (Abnormal) Collected: 02/17/24 1439    Lab Status: Final result Specimen: Urine, Other Updated: 02/17/24 1822     RBC, UA Innumerable /hpf      WBC, UA Innumerable /hpf      Epithelial Cells Occasional /hpf      Bacteria, UA Innumerable /hpf      WBC Clumps Present    Urine culture [[id number]] Collected: 02/17/24 1439    Lab Status: In process Specimen: Urine, Other Updated: 02/17/24 1822    UA w Reflex to Microscopic w Reflex to Culture [[id number]]  (Abnormal) Collected: 02/17/24 1439    Lab Status: Final result Specimen: Urine, Other Updated: 02/17/24 1638     Color, UA Light Orange     Clarity, UA Turbid     Specific Gravity, UA 1.031     pH, UA 6.0     Leukocytes, UA Large     Nitrite, UA Positive     Protein, UA 30 (1+) mg/dl      Glucose, UA >=1000 (1%) mg/dl      Ketones, UA 20 (1+) mg/dl      Urobilinogen, UA <2.0 mg/dl      Bilirubin, UA  Negative     Occult Blood, UA Large    Fingerstick Glucose (POCT) [[id number]]  (Abnormal) Collected: 02/17/24 1631    Lab Status: Final result Updated: 02/17/24 1632     POC Glucose 424 mg/dl     CBC and differential [[id number]]  (Abnormal) Collected: 02/17/24 1439    Lab Status: Preliminary result Specimen: Blood from Arm, Right Updated: 02/17/24 1535     WBC 5.84 Thousand/uL      RBC 4.02 Million/uL      Hemoglobin 13.4 g/dL      Hematocrit 40.1 %       fL      MCH 33.3 pg      MCHC 33.4 g/dL      RDW 14.3 %      MPV 10.7 fL      Platelets 88 Thousands/uL      nRBC 0 /100 WBCs     Comprehensive metabolic panel [[id number]]  (Abnormal) Collected: 02/17/24 1439    Lab Status: Final result Specimen: Blood from Arm, Right Updated: 02/17/24 1508     Sodium 131 mmol/L      Potassium 3.9 mmol/L      Chloride 98 mmol/L      CO2 24 mmol/L      ANION GAP 9 mmol/L      BUN 13 mg/dL      Creatinine 0.84 mg/dL      Glucose 476 mg/dL      Calcium 9.3 mg/dL      AST 15 U/L      ALT 11 U/L      Alkaline Phosphatase 270 U/L      Total Protein 7.4 g/dL      Albumin 3.6 g/dL      Total Bilirubin 1.79 mg/dL      eGFR 90 ml/min/1.73sq m     Narrative:      National Kid[employer] Foundation guidelines for Chronic Kidney Disease (CKD):     Stage 1 with normal or high GFR (GFR > 90 mL/min/1.73 square meters)    Stage 2 Mild CKD (GFR = 60-89 mL/min/1.73 square meters)    Stage 3A Moderate CKD (GFR = 45-59 mL/min/1.73 square meters)    Stage 3B Moderate CKD (GFR = 30-44 mL/min/1.73 square meters)    Stage 4 Severe CKD (GFR = 15-29 mL/min/1.73 square meters)    Stage 5 End Stage CKD (GFR <15 mL/min/1.73 square meters)  Note: GFR calculation is accurate only with a steady state creatinine                   No orders to display         Procedures  Procedures      ED Course                             SBIRT 20yo+      Flowsheet Row Most Recent Value   Initial Alcohol Screen: US AUDIT-C     1. How often do you have a drink containing  alcohol? 0 Filed at: 02/17/2024 1438   2. How many drinks containing alcohol do you have on a typical day you are drinking?  0 Filed at: 02/17/2024 1438   3a. Male UNDER 65: How often do you have five or more drinks on one occasion? 0 Filed at: 02/17/2024 1438   3b. FEMALE Any Age, or MALE 65+: How often do you have 4 or more drinks on one occassion? 0 Filed at: 02/17/2024 1438   Audit-C Score 0 Filed at: 02/17/2024 1438   FRAN: How many times in the past year have you...    Used an illegal drug or used a prescription medication for non-medical reasons? Never Filed at: 02/17/2024 1438                  Medical Decision Making  69 yo M pPMHx of uncontrolled diabetes, HTN, presents to the ED complaining of 2 days of hematuria, dysuria, frequency and urgency.    Ddx: cystitis, hemorrhagic cystitis, bladder malignancy, symptomatic hyperglycemia.    Complaining only of hematuria dysuria density and frequency, likely cystitis in the setting.  Labs largely unremarkable, no elevated white count. Urine with large leukocytes, positive nitrites, patient systemically well-appearing will treat with 1 dose of ceftriaxone here and plan to discharge patient home with Keflex p.o.    Patient agreeable and understanding of the plan.  Discussed strict return precautions for any new or worsening symptoms or systemic signs of illness such as fevers chills confusion nausea vomiting.    Patient understanding, discharged home in stable condition, ambulated off ED floor on her own without any difficulty.       Amount and/or Complexity of Data Reviewed  Labs: ordered.    Risk  OTC drugs.  Prescription drug management.          Disposition  Final diagnoses:   UTI (urinary tract infection)   Hematuria     Time reflects when diagnosis was documented in both MDM as applicable and the Disposition within this note       Time User Action Codes Description Comment    2/17/2024  5:00 PM Sparkle Watson Add [N39.0] UTI (urinary tract infection)      2/17/2024  5:00 PM Sparkle Watson Add [R31.9] Hematuria           ED Disposition       ED Disposition   Discharge    Condition   Stable    Date/Time   Sat Feb 17, 2024 7515    Comment   Bg Gruber discharge to home/self care.                   Follow-up Information       Follow up With Specialties Details Why Contact Info Lea Reyes, MD Internal Medicine Schedule an appointment as soon as possible for a visit   [address] [place] [address]  [phone]              Discharge Medication List as of 2/17/2024  6:26 PM        START taking these medications    Details   cephalexin (KEFLEX) 500 mg capsule Take 1 capsule (500 mg total) by mouth every 6 (six) hours for 5 days, Starting Sat 2/17/2024, Until Thu 2/22/2024, Normal           CONTINUE these medications which have NOT CHANGED    Details   atorvastatin (LIPITOR) 20 mg tablet TAKE 1 TABLET BY MOUTH EVERY DAY, Normal      B-D UF III MINI PEN NEEDLES 31G X 5 MM MISC BY DOES NOT APPLY ROUTE DAILY TO INJECT INSULIN, Normal      cholecalciferol (VITAMIN D3) 1,000 units tablet Take 1 tablet (1,000 Units total) by mouth daily, Starting Mon 3/27/2023, No Print      Empagliflozin (Jardiance) 10 MG TABS tablet Take 1 tablet (10 mg total) by mouth daily, Starting Mon 3/27/2023, Normal      escitalopram (LEXAPRO) 20 mg tablet TAKE 1 TABLET BY MOUTH EVERY DAY, Normal      glucose blood (OneTouch Ultra) test strip Use 1 each 3 (three) times a day Use as instructed, Starting Wed 12/15/2021, Normal      insulin degludec (TRESIBA) 100 units/mL injection pen Inject 40 Units under the skin daily *replaces Lantus*, Starting Thu 3/30/2023, Normal      Lancets (OneTouch Delica Plus [id number]) MISC TEST DAILY, Normal      metFORMIN (GLUCOPHAGE-XR) 500 mg 24 hr tablet TAKE 4 TABLETS (2,000 MG TOTAL) BY MOUTH DAILY FOR DIABETES, Starting Fri 12/22/2023, Normal      metoprolol succinate (TOPROL-XL) 100 mg 24 hr tablet Take 1 tablet (100 mg total) by mouth daily, Starting  Mon 2/12/2024, Normal      Toujeo SoloStar 300 units/mL CONCENTRATED U-300 injection pen (1-unit dial) INJECT 40 UNITS UNDER THE SKIN DAILY, Starting Thu 4/27/2023, Normal           No discharge procedures on file.    PDMP Review       None             ED Provider  Attending physically available and evaluated Bg FRIEND Yasmani. I managed the patient along with the ED Attending.    Electronically Signed by           Sparkle Watson MD  02/17/24 2000

## 2024-02-17 NOTE — ED ATTENDING ATTESTATION
2/17/2024  I, Dagoberto Kumari MD, saw and evaluated the patient. I have discussed the patient with the resident/non-physician practitioner and agree with the resident's/non-physician practitioner's findings, Plan of Care, and MDM as documented in the resident's/non-physician practitioner's note, except where noted. All available labs and Radiology studies were reviewed.  I was present for key portions of any procedure(s) performed by the resident/non-physician practitioner and I was immediately available to provide assistance.       At this point I agree with the current assessment done in the Emergency Department.  I have conducted an independent evaluation of this patient a history and physical is as follows:  Dysuria  and  hematuria      no fever no back pain    urgency    no abd pain    No vomiting   no diarrhea   Pmh dm    Exam patient is nontoxic-appearing he is in no acute distress sclera are anicteric conjunctiva are pink mucous membranes are moist lungs are clear heart is regular abdomen is soft positive bowel sounds no CVA tenderness noted  Impression hematuria differential considerations would include hemorrhagic cystitis less likely coagulopathy  Less likely bladder tumor  Less likely kidney stone  Labs urine  ED Course         Critical Care Time  Procedures       Patient much less confused from initial presentation. Only forgetting events surrounding passing of her dog and when reminded becomes upset. Family feels comfortable w/ patients state and both family and ppt would like to go home. Discussed w/ Dr. Champion who has reevaluated pt and agrees w/ dispo home  Irma Beyer PA-C

## 2024-02-19 ENCOUNTER — VBI (OUTPATIENT)
Dept: INTERNAL MEDICINE CLINIC | Facility: CLINIC | Age: 69
End: 2024-02-19

## 2024-02-19 NOTE — TELEPHONE ENCOUNTER
02/19/24 11:10 AM    Patient contacted post ED visit, first outreach attempt made. Message was left for patient to return a call to the [facility] Department at [facility]: Phone [phone].    Thank you.  [facility] Guthrie  [facility]

## 2024-02-20 LAB — BACTERIA UR CULT: ABNORMAL

## 2024-02-20 NOTE — TELEPHONE ENCOUNTER
02/20/24 10:51 AM    Patient contacted post ED visit, [facility] department spoke with patient/caregiver and outreach questions were declined.    Thank you.  [facility] Guthrie  [facility]

## 2024-02-23 DIAGNOSIS — E08.65 DIABETES MELLITUS DUE TO UNDERLYING CONDITION, UNCONTROLLED, WITH HYPERGLYCEMIA (HCC): ICD-10-CM

## 2024-02-23 RX ORDER — BLOOD SUGAR DIAGNOSTIC
1 STRIP MISCELLANEOUS 3 TIMES DAILY
Qty: 300 STRIP | Refills: 1 | Status: SHIPPED | OUTPATIENT
Start: 2024-02-23

## 2024-02-23 NOTE — TELEPHONE ENCOUNTER
Reason for call:   [x] Refill   [] Prior Auth  [] Other:     Office:   [x] PCP/Provider -  Lea Reyes, MD   [] Specialty/Provider -     Medication:     glucose blood (OneTouch Ultra) test strip       Dose/Frequency: 1 each, Other, 3 times daily     Quantity: 300    Pharmacy: Pharmacy: [facility]/pharmacy #[id number] - [place], [place] - [address]     Does the patient have enough for 3 days?   [] Yes   [x] No - Send as HP to POD

## 2024-03-08 ENCOUNTER — OFFICE VISIT (OUTPATIENT)
Dept: INTERNAL MEDICINE CLINIC | Facility: CLINIC | Age: 69
End: 2024-03-08
Payer: COMMERCIAL

## 2024-03-08 VITALS
SYSTOLIC BLOOD PRESSURE: 146 MMHG | DIASTOLIC BLOOD PRESSURE: 86 MMHG | HEART RATE: 88 BPM | WEIGHT: 167 LBS | HEIGHT: 68 IN | BODY MASS INDEX: 25.31 KG/M2 | OXYGEN SATURATION: 99 %

## 2024-03-08 DIAGNOSIS — E11.65 POORLY CONTROLLED DIABETES MELLITUS (HCC): Primary | ICD-10-CM

## 2024-03-08 DIAGNOSIS — N47.1 PHIMOSIS OF PENIS: ICD-10-CM

## 2024-03-08 DIAGNOSIS — M35.00 SJOGREN'S SYNDROME, WITH UNSPECIFIED ORGAN INVOLVEMENT (HCC): ICD-10-CM

## 2024-03-08 DIAGNOSIS — D69.6 PLATELETS DECREASED (HCC): ICD-10-CM

## 2024-03-08 DIAGNOSIS — K76.6 PORTAL HYPERTENSIVE GASTROPATHY: ICD-10-CM

## 2024-03-08 DIAGNOSIS — K31.89 PORTAL HYPERTENSIVE GASTROPATHY: ICD-10-CM

## 2024-03-08 DIAGNOSIS — K41.91: ICD-10-CM

## 2024-03-08 LAB — SL AMB POCT HEMOGLOBIN AIC: 13.4 (ref ?–6.5)

## 2024-03-08 PROCEDURE — 99214 OFFICE O/P EST MOD 30 MIN: CPT | Performed by: INTERNAL MEDICINE

## 2024-03-08 PROCEDURE — 83036 HEMOGLOBIN GLYCOSYLATED A1C: CPT | Performed by: INTERNAL MEDICINE

## 2024-03-08 RX ORDER — METFORMIN HYDROCHLORIDE 500 MG/1
1000 TABLET, EXTENDED RELEASE ORAL
Start: 2024-03-08

## 2024-03-08 NOTE — ASSESSMENT & PLAN NOTE
Has been poorly compliant to care in the past year or more  He is back to taking all his meds  He reports diarrhea at night which may not be from the recent abx but from metformin  I am having him only take the metformin in the morning  Continue Toujeo 40units at night and Jardiance  I would like him to see our clinical pharmacist  Lab Results   Component Value Date    HGBA1C 13.4 (A) 03/08/2024

## 2024-03-08 NOTE — PROGRESS NOTES
Name: Bg Gruber      : 1955      MRN: [id number]  Encounter Provider: Lea Reyes, MD  Encounter Date: 3/8/2024   [facility]: [facility]    Assessment & Plan     1. Poorly controlled diabetes mellitus (HCC)  Assessment & Plan:  Has been poorly compliant to care in the past year or more  He is back to taking all his meds  He reports diarrhea at night which may not be from the recent abx but from metformin  I am having him only take the metformin in the morning  Continue Toujeo 40units at night and Jardiance  I would like him to see our clinical pharmacist  Lab Results   Component Value Date    HGBA1C 13.4 (A) 2024     Orders:  -     POCT hemoglobin A1c  -     Comprehensive metabolic panel; Future; Expected date: 2024  -     CBC and differential; Future; Expected date: 2024  -     Albumin / creatinine urine ratio; Future; Expected date: 2024  -     Lipid Panel with Direct LDL reflex; Future; Expected date: 2024  -     TSH, 3rd generation with Free T4 reflex; Future; Expected date: 2024  -     metFORMIN (GLUCOPHAGE-XR) 500 mg 24 hr tablet; Take 2 tablets (1,000 mg total) by mouth daily with breakfast For diabetes  -     Ambulatory referral to clinical pharmacy; Future    2. Sjogren's syndrome, with unspecified organ involvement (HCC)  -     CBC and differential; Future; Expected date: 2024    3. Platelets decreased (HCC)  -     CBC and differential; Future; Expected date: 2024    4. Portal hypertensive gastropathy   -     Comprehensive metabolic panel; Future; Expected date: 2024    5. Phimosis of penis  -     Ambulatory Referral to Urology; Future    6. Recurrent right femoral hernia  -     Ambulatory Referral to General Surgery; Future           Subjective      Here for a follow up. He was last seen a year ago  Hematuria 3 weeks ago and went to the ER, dx with a UTI and placed on cephalexin. He was having frequency  for a long time prior.   He has been having diarrhea since the antibiotic, only at night  Admits to poor compliance with all his meds, poor diet, drinking plenty of soda  for the past year until this ER visit  Denies alcohol use  He has improved his diet and resumed all his meds. He is checking his blood sugar and it has been <200  C/o a right groin hernia  C/o foreskin can be very hard to retract      Review of Systems   Constitutional:  Positive for unexpected weight change (weight loss).   Respiratory:  Negative for shortness of breath.    Cardiovascular:  Negative for chest pain and palpitations.   Gastrointestinal:  Negative for abdominal pain, constipation and diarrhea.   Endocrine: Positive for polydipsia and polyuria.       Current Outpatient Medications on File Prior to Visit   Medication Sig   • atorvastatin (LIPITOR) 20 mg tablet TAKE 1 TABLET BY MOUTH EVERY DAY   • B-D UF III MINI PEN NEEDLES 31G X 5 MM MISC BY DOES NOT APPLY ROUTE DAILY TO INJECT INSULIN   • cholecalciferol (VITAMIN D3) 1,000 units tablet Take 1 tablet (1,000 Units total) by mouth daily   • Empagliflozin (Jardiance) 10 MG TABS tablet Take 1 tablet (10 mg total) by mouth daily   • escitalopram (LEXAPRO) 20 mg tablet TAKE 1 TABLET BY MOUTH EVERY DAY   • glucose blood (OneTouch Ultra) test strip Use 1 each 3 (three) times a day Use as instructed   • Lancets (OneTouch Delica Plus [id number]) MISC TEST DAILY   • metoprolol succinate (TOPROL-XL) 100 mg 24 hr tablet Take 1 tablet (100 mg total) by mouth daily   • Toujeo SoloStar 300 units/mL CONCENTRATED U-300 injection pen (1-unit dial) INJECT 40 UNITS UNDER THE SKIN DAILY   • [DISCONTINUED] metFORMIN (GLUCOPHAGE-XR) 500 mg 24 hr tablet TAKE 4 TABLETS (2,000 MG TOTAL) BY MOUTH DAILY FOR DIABETES   • [DISCONTINUED] insulin degludec (TRESIBA) 100 units/mL injection pen Inject 40 Units under the skin daily *replaces Lantus*       Objective     /86 (BP Location: Left arm, Patient Position:  "Sitting, Cuff Size: Standard)   Pulse 88   Ht 5' 8\" (1.727 m)   Wt 75.8 kg (167 lb)   SpO2 99%   BMI 25.39 kg/m²     Physical Exam  Constitutional:       Appearance: He is ill-appearing.   Cardiovascular:      Rate and Rhythm: Regular rhythm.      Heart sounds: Normal heart sounds.   Pulmonary:      Breath sounds: Normal breath sounds.   Abdominal:      Palpations: Abdomen is soft.      Tenderness: There is no abdominal tenderness.      Hernia: A hernia is present. Hernia is present in the right femoral area.   Genitourinary:     Penis: No phimosis.    Musculoskeletal:      Right lower leg: No edema.      Left lower leg: No edema.   Psychiatric:         Mood and Affect: Mood is depressed.         Behavior: Behavior normal.       Lea Reyes, MD    "

## 2024-03-09 ENCOUNTER — OFFICE VISIT (OUTPATIENT)
Dept: URGENT CARE | Age: 69
End: 2024-03-09
Payer: COMMERCIAL

## 2024-03-09 ENCOUNTER — NURSE TRIAGE (OUTPATIENT)
Dept: OTHER | Facility: OTHER | Age: 69
End: 2024-03-09

## 2024-03-09 VITALS
RESPIRATION RATE: 18 BRPM | TEMPERATURE: 97.8 F | SYSTOLIC BLOOD PRESSURE: 164 MMHG | OXYGEN SATURATION: 99 % | DIASTOLIC BLOOD PRESSURE: 93 MMHG | HEART RATE: 90 BPM

## 2024-03-09 DIAGNOSIS — R39.9 UTI SYMPTOMS: ICD-10-CM

## 2024-03-09 DIAGNOSIS — R31.0 GROSS HEMATURIA: ICD-10-CM

## 2024-03-09 DIAGNOSIS — R30.0 DYSURIA: Primary | ICD-10-CM

## 2024-03-09 LAB
GLUCOSE SERPL-MCNC: 130 MG/DL (ref 65–140)
SL AMB  POCT GLUCOSE, UA: 2000
SL AMB LEUKOCYTE ESTERASE,UA: ABNORMAL
SL AMB POCT BILIRUBIN,UA: ABNORMAL
SL AMB POCT BLOOD,UA: ABNORMAL
SL AMB POCT CLARITY,UA: ABNORMAL
SL AMB POCT COLOR,UA: ABNORMAL
SL AMB POCT KETONES,UA: ABNORMAL
SL AMB POCT NITRITE,UA: ABNORMAL
SL AMB POCT PH,UA: 6
SL AMB POCT SPECIFIC GRAVITY,UA: 1.02
SL AMB POCT URINE PROTEIN: 2000
SL AMB POCT UROBILINOGEN: 1

## 2024-03-09 PROCEDURE — 99214 OFFICE O/P EST MOD 30 MIN: CPT | Performed by: NURSE PRACTITIONER

## 2024-03-09 PROCEDURE — 81002 URINALYSIS NONAUTO W/O SCOPE: CPT | Performed by: NURSE PRACTITIONER

## 2024-03-09 PROCEDURE — 87086 URINE CULTURE/COLONY COUNT: CPT | Performed by: NURSE PRACTITIONER

## 2024-03-09 PROCEDURE — 82948 REAGENT STRIP/BLOOD GLUCOSE: CPT

## 2024-03-09 RX ORDER — SULFAMETHOXAZOLE AND TRIMETHOPRIM 800; 160 MG/1; MG/1
1 TABLET ORAL EVERY 12 HOURS SCHEDULED
Qty: 14 TABLET | Refills: 0 | Status: SHIPPED | OUTPATIENT
Start: 2024-03-09 | End: 2024-03-16

## 2024-03-09 NOTE — TELEPHONE ENCOUNTER
"Regarding: blood in urine, burning sensation  ----- Message from [facility] sent at 3/9/2024  9:01 AM EST -----  \"I am having symptoms of a UTI again after getting over it from a couple of weeks ago. I had a burning sensation last night and when I woke up today there was blood in my urine.\"    "

## 2024-03-09 NOTE — PATIENT INSTRUCTIONS
Your urine shows bacteria.  You have been prescribed an antibiotic. You are to take all medication as prescribed.   You are to avoid alcohol and caffeine - they are bladder irritants.  Drink water.  Take tylenol or motrin for pain or fever.    You are to download [email or web address] for the results in 3-5 days.   You will be notified if the antibiotic needs changed.  Follow up with your PCP in 2-3 days.   Go to the ED if symptoms worsen.      You need to see urology - make sure you get a referral as you stated your PCP was working on it  You are to go directly to the ED if symptoms worsen

## 2024-03-09 NOTE — PROGRESS NOTES
[facility]        NAME: Bg Gruber is a 68 y.o. male  : 1955    MRN: [id number]  DATE: 2024  TIME: 11:28 AM    Assessment and Plan   Dysuria [R30.0]  1. Dysuria  sulfamethoxazole-trimethoprim (BACTRIM DS) 800-160 mg per tablet      2. UTI symptoms  POCT urine dip    Urine culture    sulfamethoxazole-trimethoprim (BACTRIM DS) 800-160 mg per tablet      3. Gross hematuria  sulfamethoxazole-trimethoprim (BACTRIM DS) 800-160 mg per tablet            Patient Instructions       Follow up with PCP in 3-5 days.  Proceed to  ER if symptoms worsen.    If tests have been performed at [facility], our office will contact you with results if changes need to be made to the care plan discussed with you at the visit.  You can review your full results on [facility].    Your urine shows bacteria.  You have been prescribed an antibiotic. You are to take all medication as prescribed.   You are to avoid alcohol and caffeine - they are bladder irritants.  Drink water.  Take tylenol or motrin for pain or fever.    You are to download [facility] mychart for the results in 3-5 days.   You will be notified if the antibiotic needs changed.  Follow up with your PCP in 2-3 days.   Go to the ED if symptoms worsen.      You need to see urology - make sure you get a referral as you stated your PCP was working on it  You are to go directly to the ED if symptoms worsen        Chief Complaint     Chief Complaint   Patient presents with    Possible UTI     Patient experiencing UTI symptoms three weeks ago that was treated with antibiotics. Last night we woke up with burning and blood in urine. This morning the blood has become larger with visible blood clots.          History of Present Illness       This is a 68 year old male who states that he had a UTI 3 weeks ago and was on cephalexin and took all of it and got better.  He states last night he had a burning sensation with urinating and today noted gross hematuria.   He denies fevers, chills, back pain, nausea, vomiting or problems with urinating. Hx of kidney stones.  He states that he saw his PCP yesterday and was given a urology referral.  He has not taken anything for his symptoms. PMH is listed.         Review of Systems   Review of Systems   Constitutional: Negative.    HENT: Negative.     Eyes: Negative.    Respiratory: Negative.     Cardiovascular: Negative.    Gastrointestinal: Negative.    Endocrine: Negative.    Genitourinary:  Positive for dysuria and hematuria.   Musculoskeletal: Negative.    Skin: Negative.    Allergic/Immunologic: Negative.    Neurological: Negative.    Hematological: Negative.    Psychiatric/Behavioral: Negative.           Current Medications       Current Outpatient Medications:     atorvastatin (LIPITOR) 20 mg tablet, TAKE 1 TABLET BY MOUTH EVERY DAY, Disp: 90 tablet, Rfl: 3    B-D UF III MINI PEN NEEDLES 31G X 5 MM MISC, BY DOES NOT APPLY ROUTE DAILY TO INJECT INSULIN, Disp: 100 each, Rfl: 6    cholecalciferol (VITAMIN D3) 1,000 units tablet, Take 1 tablet (1,000 Units total) by mouth daily, Disp: , Rfl:     Empagliflozin (Jardiance) 10 MG TABS tablet, Take 1 tablet (10 mg total) by mouth daily, Disp: 30 tablet, Rfl: 11    escitalopram (LEXAPRO) 20 mg tablet, TAKE 1 TABLET BY MOUTH EVERY DAY, Disp: 90 tablet, Rfl: 0    glucose blood (OneTouch Ultra) test strip, Use 1 each 3 (three) times a day Use as instructed, Disp: 300 strip, Rfl: 1    Lancets (OneTouch Delica Plus [id number]) MISC, TEST DAILY, Disp: 100 each, Rfl: 1    metFORMIN (GLUCOPHAGE-XR) 500 mg 24 hr tablet, Take 2 tablets (1,000 mg total) by mouth daily with breakfast For diabetes, Disp: , Rfl:     metoprolol succinate (TOPROL-XL) 100 mg 24 hr tablet, Take 1 tablet (100 mg total) by mouth daily, Disp: 90 tablet, Rfl: 0    sulfamethoxazole-trimethoprim (BACTRIM DS) 800-160 mg per tablet, Take 1 tablet by mouth every 12 (twelve) hours for 7 days, Disp: 14 tablet, Rfl: 0    Toujeo  SoloStar 300 units/mL CONCENTRATED U-300 injection pen (1-unit dial), INJECT 40 UNITS UNDER THE SKIN DAILY, Disp: 13.5 mL, Rfl: 1    Current Allergies     Allergies as of 03/09/2024 - Reviewed 03/09/2024   Allergen Reaction Noted    Duloxetine hcl Other (See Comments) 08/01/2019    Lisinopril Cough 06/13/2012    Olmesartan medoxomil-hctz  06/13/2012            The following portions of the patient's history were reviewed and updated as appropriate: allergies, current medications, past family history, past medical history, past social history, past surgical history and problem list.     Past Medical History:   Diagnosis Date    Abnormal liver function test     LAST ASSESSED: 13JUN2012    Anxiety     Athlete's foot     LAST ASSESSED: 08MAY2013    Callus     LAST ASSESSED: 08MAY2013    Carpal tunnel syndrome, unspecified upper limb     LAST ASSESSED: 02OCT2012    Chronic pain syndrome     LAST ASSESSED: 22OCT2013    Colon polyp     Diabetes mellitus (HCC)     Diabetes mellitus due to underlying condition, uncontrolled, with hyperglycemia (HCC) 7/25/2016    Transitioned From: Diabetes mellitus type 2, uncontrolled    Esophageal varices without bleeding, unspecified esophageal varices type (HCC) 3/27/2023    Herpes zoster ophthalmicus of right eye     LAST ASSESSED: 10NOV2016    Hyperglycemia     LAST ASSESSED: 08MAY2013    Hyperplastic colon polyp     Obesity 5/22/2017    Pericardial effusion     LAST ASSESSED: 13JUN2012    Positive colorectal cancer screening using Cologuard test 6/8/2021    Right lower quadrant abdominal pain 6/8/2021       Past Surgical History:   Procedure Laterality Date    COLONOSCOPY      HEMORROIDECTOMY      LUMBAR LAMINECTOMY      LAST ASSESSED: 07APR2015    TONSILLECTOMY AND ADENOIDECTOMY      WISDOM TOOTH EXTRACTION         Family History   Problem Relation Age of Onset    No Known Problems Father     Hypotension Family     Prostate cancer Family     Coronary artery disease Family      Diabetes Family     Diverticulitis Mother     Breast cancer Mother          Medications have been verified.        Objective   /93   Pulse 90   Temp 97.8 °F (36.6 °C)   Resp 18   SpO2 99%   No LMP for male patient.       Physical Exam     Physical Exam  Vitals and nursing note reviewed.   Constitutional:       General: He is not in acute distress.     Appearance: Normal appearance. He is normal weight. He is not ill-appearing, toxic-appearing or diaphoretic.      Comments: Appears older than stated age    HENT:      Head: Normocephalic and atraumatic.      Nose: Nose normal.      Mouth/Throat:      Mouth: Mucous membranes are moist.   Eyes:      Extraocular Movements: Extraocular movements intact.   Cardiovascular:      Rate and Rhythm: Normal rate and regular rhythm.      Pulses: Normal pulses.      Heart sounds: Normal heart sounds. No murmur heard.  Pulmonary:      Effort: Pulmonary effort is normal. No respiratory distress.      Breath sounds: Normal breath sounds. No stridor. No wheezing, rhonchi or rales.   Chest:      Chest wall: No tenderness.   Abdominal:      Palpations: Abdomen is soft.      Tenderness: There is no right CVA tenderness or left CVA tenderness.   Musculoskeletal:         General: Normal range of motion.      Cervical back: Normal range of motion.   Skin:     General: Skin is warm and dry.      Capillary Refill: Capillary refill takes less than 2 seconds.   Neurological:      General: No focal deficit present.      Mental Status: He is alert and oriented to person, place, and time.   Psychiatric:         Mood and Affect: Mood normal.         Behavior: Behavior normal.         Thought Content: Thought content normal.         Judgment: Judgment normal.             Poct urine with large blood, unable to see leuks on strip due to blood.  Urine was red urine.  Glucose 2000 pt is on Toujeo   Trace ketones   protein 2000     SG 1.025  Large bilirubin     Urine culture sent  Pt has  cirrhosis of liver.     Strict ED instructions given to patient.  Instructed to drink plenty of water.      Urine culture 2/17/2024   staphyloccoccus aeurus  sussceptible to cephalxin - pt states took 5 days worth and had improvement.   Last night started with burning last night and red urine today    Will place on Bactrim DS  sensitive to last ua cx   CMP 2/2024   Cr 0.84   GCR 90

## 2024-03-09 NOTE — TELEPHONE ENCOUNTER
"Reason for Disposition   Urinating more frequently than usual (i.e., frequency)    Answer Assessment - Initial Assessment Questions  1. SYMPTOM: \"What's the main symptom you're concerned about?\" (e.g., frequency, incontinence)      Blood in urine  2. ONSET: \"When did the symptoms start?\"  3/8    3. PAIN: \"Is there any pain?\" If Yes, ask: \"How bad is it?\" (Scale: 1-10; mild, moderate, severe)  4/10    4. CAUSE: \"What do you think is causing the symptoms?\"  UTI    5. OTHER SYMPTOMS: \"Do you have any other symptoms?\" (e.g., fever, flank pain, blood in urine, pain with urination)      Pain with urination    Protocols used: Urinary Symptoms-ADULT-    Recommended that patient be seen in 24hrs at [facility] for his symptoms. Pt agreeable and will go today for evaluation.   "

## 2024-03-12 ENCOUNTER — TELEPHONE (OUTPATIENT)
Dept: URGENT CARE | Age: 69
End: 2024-03-12

## 2024-03-12 ENCOUNTER — TELEPHONE (OUTPATIENT)
Age: 69
End: 2024-03-12

## 2024-03-12 LAB — BACTERIA UR CULT: NORMAL

## 2024-03-12 NOTE — TELEPHONE ENCOUNTER
New Patient    What is the reason for the patient’s appointment?:Patient called stating he was seen by his family doctor  and he was referred to see Urology .  He was having blood in urine but it cleared his is having phimosis on penis and needs to be seen.    He would like to be seen in [place]. No appointment available until May.      Patient scheduled appointment in [place] with Sam for 04/18/24. Since it was the soonest appointment available.  Please review if appointment is within the appropriate time frame.     What office location does the patient prefer?:[place]     Does patient have Imaging/Lab Results:    Have patient records been requested?:  If No, are the records showing in Epic:       INSURANCE:   Do we accept the patient's insurance or is the patient Self-Pay?:    Insurance Provider:[employer]  Plan Type/Number:   Member ID#:       HISTORY:   Has the patient had any previous Urologist(s)?:no     Was the patient seen in the ED?:    Has the patient had any outside testing done?:urine test     Does the patient have a personal history of cancer?:no

## 2024-03-12 NOTE — TELEPHONE ENCOUNTER
Called patient regarding results of his urine culture.  He had gross hematuria on his urine dip.  He was placed on Bactrim, his culture is less than 10,000 CFU mixed contaminants.  Advise stopping Bactrim at this time.  Gross hematuria is a recurrent issue for him, last time he did have a urinary tract infection and was treated with Keflex.  Currently he is feeling in his usual state of health, he reports that blood and urine cleared up the next day after his visit.  He has not yet scheduled with urology but will call today for appointment.

## 2024-03-13 ENCOUNTER — APPOINTMENT (OUTPATIENT)
Dept: LAB | Facility: CLINIC | Age: 69
End: 2024-03-13
Payer: COMMERCIAL

## 2024-03-13 ENCOUNTER — TRANSCRIBE ORDERS (OUTPATIENT)
Dept: LAB | Facility: CLINIC | Age: 69
End: 2024-03-13

## 2024-03-13 DIAGNOSIS — K31.89 PORTAL HYPERTENSIVE GASTROPATHY  (HCC): ICD-10-CM

## 2024-03-13 DIAGNOSIS — E11.65 POORLY CONTROLLED DIABETES MELLITUS (HCC): Primary | ICD-10-CM

## 2024-03-13 DIAGNOSIS — D69.6 PLATELETS DECREASED (HCC): ICD-10-CM

## 2024-03-13 DIAGNOSIS — E11.65 POORLY CONTROLLED DIABETES MELLITUS (HCC): ICD-10-CM

## 2024-03-13 DIAGNOSIS — K76.6 PORTAL HYPERTENSIVE GASTROPATHY  (HCC): ICD-10-CM

## 2024-03-13 DIAGNOSIS — M35.00 SJOGREN'S SYNDROME, WITH UNSPECIFIED ORGAN INVOLVEMENT (HCC): ICD-10-CM

## 2024-03-13 LAB
ALBUMIN SERPL BCP-MCNC: 3.6 G/DL (ref 3.5–5)
ALP SERPL-CCNC: 215 U/L (ref 34–104)
ALT SERPL W P-5'-P-CCNC: 24 U/L (ref 7–52)
ANION GAP SERPL CALCULATED.3IONS-SCNC: 11 MMOL/L (ref 4–13)
AST SERPL W P-5'-P-CCNC: 30 U/L (ref 13–39)
BASOPHILS # BLD AUTO: 0.06 THOUSANDS/ÂΜL (ref 0–0.1)
BASOPHILS NFR BLD AUTO: 1 % (ref 0–1)
BILIRUB SERPL-MCNC: 1.3 MG/DL (ref 0.2–1)
BUN SERPL-MCNC: 21 MG/DL (ref 5–25)
CALCIUM SERPL-MCNC: 9.1 MG/DL (ref 8.4–10.2)
CHLORIDE SERPL-SCNC: 104 MMOL/L (ref 96–108)
CHOLEST SERPL-MCNC: 86 MG/DL
CO2 SERPL-SCNC: 22 MMOL/L (ref 21–32)
CREAT SERPL-MCNC: 0.85 MG/DL (ref 0.6–1.3)
CREAT UR-MCNC: 74.8 MG/DL
EOSINOPHIL # BLD AUTO: 0.05 THOUSAND/ÂΜL (ref 0–0.61)
EOSINOPHIL NFR BLD AUTO: 1 % (ref 0–6)
ERYTHROCYTE [DISTWIDTH] IN BLOOD BY AUTOMATED COUNT: 16 % (ref 11.6–15.1)
EST. AVERAGE GLUCOSE BLD GHB EST-MCNC: 272 MG/DL
GFR SERPL CREATININE-BSD FRML MDRD: 90 ML/MIN/1.73SQ M
GLUCOSE P FAST SERPL-MCNC: 96 MG/DL (ref 65–99)
HBA1C MFR BLD: 11.1 %
HCT VFR BLD AUTO: 42.5 % (ref 36.5–49.3)
HDLC SERPL-MCNC: 36 MG/DL
HGB BLD-MCNC: 13.4 G/DL (ref 12–17)
IMM GRANULOCYTES # BLD AUTO: 0.02 THOUSAND/UL (ref 0–0.2)
IMM GRANULOCYTES NFR BLD AUTO: 0 % (ref 0–2)
LDLC SERPL CALC-MCNC: 37 MG/DL (ref 0–100)
LYMPHOCYTES # BLD AUTO: 2.04 THOUSANDS/ÂΜL (ref 0.6–4.47)
LYMPHOCYTES NFR BLD AUTO: 31 % (ref 14–44)
MCH RBC QN AUTO: 32.5 PG (ref 26.8–34.3)
MCHC RBC AUTO-ENTMCNC: 31.5 G/DL (ref 31.4–37.4)
MCV RBC AUTO: 103 FL (ref 82–98)
MICROALBUMIN UR-MCNC: 15.9 MG/L
MICROALBUMIN/CREAT 24H UR: 21 MG/G CREATININE (ref 0–30)
MONOCYTES # BLD AUTO: 0.55 THOUSAND/ÂΜL (ref 0.17–1.22)
MONOCYTES NFR BLD AUTO: 8 % (ref 4–12)
NEUTROPHILS # BLD AUTO: 3.83 THOUSANDS/ÂΜL (ref 1.85–7.62)
NEUTS SEG NFR BLD AUTO: 59 % (ref 43–75)
NRBC BLD AUTO-RTO: 0 /100 WBCS
PLATELET # BLD AUTO: 159 THOUSANDS/UL (ref 149–390)
PMV BLD AUTO: 10.3 FL (ref 8.9–12.7)
POTASSIUM SERPL-SCNC: 4.2 MMOL/L (ref 3.5–5.3)
PROT SERPL-MCNC: 8.1 G/DL (ref 6.4–8.4)
RBC # BLD AUTO: 4.12 MILLION/UL (ref 3.88–5.62)
SODIUM SERPL-SCNC: 137 MMOL/L (ref 135–147)
TRIGL SERPL-MCNC: 66 MG/DL
TSH SERPL DL<=0.05 MIU/L-ACNC: 2.45 UIU/ML (ref 0.45–4.5)
WBC # BLD AUTO: 6.55 THOUSAND/UL (ref 4.31–10.16)

## 2024-03-13 PROCEDURE — 83036 HEMOGLOBIN GLYCOSYLATED A1C: CPT

## 2024-03-13 PROCEDURE — 36415 COLL VENOUS BLD VENIPUNCTURE: CPT

## 2024-03-13 PROCEDURE — 82570 ASSAY OF URINE CREATININE: CPT

## 2024-03-13 PROCEDURE — 82043 UR ALBUMIN QUANTITATIVE: CPT

## 2024-03-13 PROCEDURE — 80061 LIPID PANEL: CPT

## 2024-03-13 PROCEDURE — 85025 COMPLETE CBC W/AUTO DIFF WBC: CPT

## 2024-03-13 PROCEDURE — 80053 COMPREHEN METABOLIC PANEL: CPT

## 2024-03-13 PROCEDURE — 84443 ASSAY THYROID STIM HORMONE: CPT

## 2024-03-26 ENCOUNTER — CONSULT (OUTPATIENT)
Dept: SURGERY | Facility: CLINIC | Age: 69
End: 2024-03-26
Payer: COMMERCIAL

## 2024-03-26 VITALS
WEIGHT: 170 LBS | SYSTOLIC BLOOD PRESSURE: 162 MMHG | BODY MASS INDEX: 25.76 KG/M2 | HEIGHT: 68 IN | DIASTOLIC BLOOD PRESSURE: 83 MMHG | HEART RATE: 81 BPM

## 2024-03-26 DIAGNOSIS — Z79.4 INSULIN LONG-TERM USE (HCC): ICD-10-CM

## 2024-03-26 DIAGNOSIS — K41.91: ICD-10-CM

## 2024-03-26 DIAGNOSIS — K74.60 CIRRHOSIS (HCC): Primary | ICD-10-CM

## 2024-03-26 DIAGNOSIS — E11.9 DIABETES (HCC): ICD-10-CM

## 2024-03-26 DIAGNOSIS — K40.90 NON-RECURRENT UNILATERAL INGUINAL HERNIA WITHOUT OBSTRUCTION OR GANGRENE: ICD-10-CM

## 2024-03-26 PROCEDURE — 99204 OFFICE O/P NEW MOD 45 MIN: CPT | Performed by: SURGERY

## 2024-03-26 NOTE — PROGRESS NOTES
Assessment/Plan: Patient presents with a right inguinal hernia.  Is been present over the last 2 months.  At present he denies pain.  He is able to reduce this easily.  Operative repair is recommended.  Risks and benefits described.    His last hemoglobin A1c is elevated at 11.  I explained that this doubles his risk for postoperative wound infection.  He believes that he can improve as his blood sugars are continuing to improve.  I repeat value at 3 months with a follow-up visit at 4 months was requested.  Patient is agreeable.    There is a history for cirrhosis on a CAT scan 3 years ago.  Also portal gastropathy was noted on upper endoscopy.  I am unsure if this has been worked up to date.  A hepatitis panel and liver elastin ultrasound is been requested.  He denies excessive alcohol use.  This is probably nonalcoholic steatohepatitis secondary to his diabetes.  Patient is due to revisit with his gastroenterologist as he is at an 3-year interval for polyp survey.    Diagnoses and all orders for this visit:    Recurrent right femoral hernia  -     Ambulatory Referral to General Surgery        Subjective:      Patient ID: Bg Gruber is a 68 y.o. male.    Patient presents for right inguinal hernia consult.  States he has had a bulge RLQ for 2 months.  Denies pain.  Does not limit his activities.        The following portions of the patient's history were reviewed and updated as appropriate:     He  has a past medical history of Abnormal liver function test, Anxiety, Athlete's foot, Callus, Carpal tunnel syndrome, unspecified upper limb, Chronic pain syndrome, Colon polyp, Diabetes mellitus (HCC), Diabetes mellitus due to underlying condition, uncontrolled, with hyperglycemia (HCC) (7/25/2016), Esophageal varices without bleeding, unspecified esophageal varices type (HCC) (3/27/2023), Herpes zoster ophthalmicus of right eye, Hyperglycemia, Hyperplastic colon polyp, Obesity (5/22/2017), Pericardial effusion,  Positive colorectal cancer screening using Cologuard test (6/8/2021), and Right lower quadrant abdominal pain (6/8/2021).  He  has a past surgical history that includes Hemorroidectomy; Lumbar laminectomy; [place] tooth extraction; Tonsillectomy and adenoidectomy; and Colonoscopy.  His family history includes Breast cancer in his mother; Coronary artery disease in his family; Diabetes in his family; Diverticulitis in his mother; Hypotension in his family; No Known Problems in his father; Prostate cancer in his family.  He  reports that he has been smoking cigarettes. He has never used smokeless tobacco. He reports current alcohol use. He reports that he does not use drugs.  Current Outpatient Medications   Medication Sig Dispense Refill    atorvastatin (LIPITOR) 20 mg tablet TAKE 1 TABLET BY MOUTH EVERY DAY 90 tablet 3    B-D UF III MINI PEN NEEDLES 31G X 5 MM MISC BY DOES NOT APPLY ROUTE DAILY TO INJECT INSULIN 100 each 6    cholecalciferol (VITAMIN D3) 1,000 units tablet Take 1 tablet (1,000 Units total) by mouth daily      Empagliflozin (Jardiance) 10 MG TABS tablet Take 1 tablet (10 mg total) by mouth daily 30 tablet 11    escitalopram (LEXAPRO) 20 mg tablet TAKE 1 TABLET BY MOUTH EVERY DAY 90 tablet 0    glucose blood (OneTouch Ultra) test strip Use 1 each 3 (three) times a day Use as instructed 300 strip 1    Lancets (OneTouch Delica Plus [id number]) MISC TEST DAILY 100 each 1    metFORMIN (GLUCOPHAGE-XR) 500 mg 24 hr tablet Take 2 tablets (1,000 mg total) by mouth daily with breakfast For diabetes      metoprolol succinate (TOPROL-XL) 100 mg 24 hr tablet Take 1 tablet (100 mg total) by mouth daily 90 tablet 0    Toujeo SoloStar 300 units/mL CONCENTRATED U-300 injection pen (1-unit dial) INJECT 40 UNITS UNDER THE SKIN DAILY 13.5 mL 1     No current facility-administered medications for this visit.     He is allergic to duloxetine hcl, lisinopril, and olmesartan medoxomil-hctz..    Review of Systems  "  Constitutional: Negative.  Negative for activity change.   HENT: Negative.     Eyes: Negative.    Respiratory: Negative.     Cardiovascular: Negative.    Gastrointestinal: Negative.    Endocrine: Negative.    Genitourinary: Negative.    Musculoskeletal: Negative.    Skin: Negative.    Allergic/Immunologic: Negative.    Neurological: Negative.    Psychiatric/Behavioral:  Negative for agitation, behavioral problems and confusion. The patient is not nervous/anxious.          Objective:      /83   Pulse 81   Ht 5' 8\" (1.727 m)   Wt 77.1 kg (170 lb)   BMI 25.85 kg/m²          Physical Exam  Constitutional:       Appearance: He is well-developed.   HENT:      Head: Atraumatic.   Eyes:      Extraocular Movements: Extraocular movements intact.   Neck:      Thyroid: No thyromegaly.      Trachea: No tracheal deviation.   Cardiovascular:      Rate and Rhythm: Regular rhythm.      Heart sounds: Normal heart sounds.   Pulmonary:      Effort: Pulmonary effort is normal.      Breath sounds: Normal breath sounds.   Abdominal:      Hernia: A hernia (Right inguinal hernia.  This is reducible.) is present.   Musculoskeletal:      Right lower leg: No edema.      Left lower leg: No edema.   Skin:     General: Skin is warm and dry.   Neurological:      Mental Status: He is alert and oriented to person, place, and time.   Psychiatric:         Behavior: Behavior normal.         "

## 2024-04-11 ENCOUNTER — TELEPHONE (OUTPATIENT)
Age: 69
End: 2024-04-11

## 2024-04-11 NOTE — TELEPHONE ENCOUNTER
Patient called stating that he received a phone call from office with no voicemail. No messages or encounters.  I confirmed patient appointment for next week  4/17.

## 2024-04-15 DIAGNOSIS — E11.65 POORLY CONTROLLED DIABETES MELLITUS (HCC): ICD-10-CM

## 2024-04-15 RX ORDER — EMPAGLIFLOZIN 10 MG/1
10 TABLET, FILM COATED ORAL DAILY
Qty: 30 TABLET | Refills: 5 | Status: SHIPPED | OUTPATIENT
Start: 2024-04-15

## 2024-04-17 ENCOUNTER — OFFICE VISIT (OUTPATIENT)
Dept: INTERNAL MEDICINE CLINIC | Facility: CLINIC | Age: 69
End: 2024-04-17
Payer: COMMERCIAL

## 2024-04-17 VITALS
HEIGHT: 68 IN | BODY MASS INDEX: 25.88 KG/M2 | HEART RATE: 66 BPM | WEIGHT: 170.8 LBS | DIASTOLIC BLOOD PRESSURE: 84 MMHG | SYSTOLIC BLOOD PRESSURE: 140 MMHG | OXYGEN SATURATION: 99 %

## 2024-04-17 DIAGNOSIS — R31.0 GROSS HEMATURIA: ICD-10-CM

## 2024-04-17 DIAGNOSIS — K40.90 NON-RECURRENT UNILATERAL INGUINAL HERNIA WITHOUT OBSTRUCTION OR GANGRENE: ICD-10-CM

## 2024-04-17 DIAGNOSIS — I10 BENIGN ESSENTIAL HYPERTENSION: ICD-10-CM

## 2024-04-17 DIAGNOSIS — E55.9 VITAMIN D DEFICIENCY: ICD-10-CM

## 2024-04-17 DIAGNOSIS — E78.5 DYSLIPIDEMIA: ICD-10-CM

## 2024-04-17 DIAGNOSIS — R74.8 ELEVATED ALKALINE PHOSPHATASE LEVEL: ICD-10-CM

## 2024-04-17 DIAGNOSIS — E11.65 POORLY CONTROLLED DIABETES MELLITUS (HCC): Primary | ICD-10-CM

## 2024-04-17 DIAGNOSIS — K74.60 CIRRHOSIS OF LIVER WITHOUT ASCITES, UNSPECIFIED HEPATIC CIRRHOSIS TYPE (HCC): ICD-10-CM

## 2024-04-17 PROCEDURE — G2211 COMPLEX E/M VISIT ADD ON: HCPCS | Performed by: INTERNAL MEDICINE

## 2024-04-17 PROCEDURE — 99214 OFFICE O/P EST MOD 30 MIN: CPT | Performed by: INTERNAL MEDICINE

## 2024-04-17 RX ORDER — METFORMIN HYDROCHLORIDE 500 MG/1
1000 TABLET, EXTENDED RELEASE ORAL 2 TIMES DAILY WITH MEALS
Qty: 360 TABLET | Refills: 1 | Status: SHIPPED | OUTPATIENT
Start: 2024-04-17

## 2024-04-17 NOTE — PROGRESS NOTES
Name: Bg Gruber      : 1955      MRN: [id number]  Encounter Provider: Lea Reyes, MD  Encounter Date: 2024   Enc[facility]    Assessment & Plan     1. Poorly controlled diabetes mellitus (HCC)  Assessment & Plan:  He was poorly compliant to meds and diet until he followed up in March after not being seen for > 1 year  Recent FBS <150 and HS 180s, no hypoglycemia  He would like to try metformin 2 g a day  Continue Jardiance and Toluanneo  Lab Results   Component Value Date    HGBA1C 11.1 (H) 2024     Orders:  -     Hemoglobin A1C; Future; Expected date: 2024  -     metFORMIN (GLUCOPHAGE-XR) 500 mg 24 hr tablet; Take 2 tablets (1,000 mg total) by mouth 2 (two) times a day with meals For diabetes    2. Benign essential hypertension  Assessment & Plan:  ACE induced cough and rise in creatinine from an ARB  Continue metoprolol XL 100mg daily    Orders:  -     CBC and differential; Future; Expected date: 2024  -     Comprehensive metabolic panel; Future; Expected date: 2024    3. Cirrhosis of liver without ascites, unspecified hepatic cirrhosis type (HCC)  Assessment & Plan:  Saw GI in  and did not f/u after EGD/colonoscopy  Reestablish with GI  Dr Lopez whom he saw for his right inguinal hernia already placed an order for an elastography      Orders:  -     AFP tumor marker; Future; Expected date: 2024  -     Protime-INR; Future; Expected date: 2024    4. Dyslipidemia  Assessment & Plan:  Controlled on atorvastatin      5. Vitamin D deficiency  -     Vitamin D 25 hydroxy; Future; Expected date: 2024    6. Elevated alkaline phosphatase level  -     Gamma GT; Future; Expected date: 2024    7. Non-recurrent unilateral inguinal hernia without obstruction or gangrene  Assessment & Plan:  He already saw Dr Lopez and will schedule surgery once diabetes better controlled      8. Gross hematuria  Comments:  Follow up  "with urology as scheduled           Subjective      Here for a follow up  Denies diarrhea on his current dose of metformin -1g daily  FBS <150, after dinner 180s  He is on Toujeo 40units, Jardiance 10mg  Denies hypoglycemic spells  Reports episodes of hematuria  He was treated with Keflex in February and Bactrim in March for UTI  Urine culture in Feb grew S. aureus and March grew mixed contaminants  He has dysuria and frequency at this time      Review of Systems   Constitutional:  Negative for chills, fever and unexpected weight change.   Respiratory:  Negative for shortness of breath.    Cardiovascular:  Negative for chest pain and palpitations.   Gastrointestinal:  Positive for abdominal pain. Negative for diarrhea.   Endocrine: Negative for polydipsia.   Genitourinary:  Positive for dysuria, frequency and hematuria.       Current Outpatient Medications on File Prior to Visit   Medication Sig   • atorvastatin (LIPITOR) 20 mg tablet TAKE 1 TABLET BY MOUTH EVERY DAY   • B-D UF III MINI PEN NEEDLES 31G X 5 MM MISC BY DOES NOT APPLY ROUTE DAILY TO INJECT INSULIN   • cholecalciferol (VITAMIN D3) 1,000 units tablet Take 1 tablet (1,000 Units total) by mouth daily   • Empagliflozin (Jardiance) 10 MG TABS tablet TAKE 1 TABLET BY MOUTH EVERY DAY   • escitalopram (LEXAPRO) 20 mg tablet TAKE 1 TABLET BY MOUTH EVERY DAY   • glucose blood (OneTouch Ultra) test strip Use 1 each 3 (three) times a day Use as instructed   • Lancets (OneTouch Delica Plus [id number]) MISC TEST DAILY   • metoprolol succinate (TOPROL-XL) 100 mg 24 hr tablet Take 1 tablet (100 mg total) by mouth daily   • Toujeo SoloStar 300 units/mL CONCENTRATED U-300 injection pen (1-unit dial) INJECT 40 UNITS UNDER THE SKIN DAILY       Objective     /84 (BP Location: Left arm, Patient Position: Sitting, Cuff Size: Standard)   Pulse 66   Ht 5' 8\" (1.727 m)   Wt 77.5 kg (170 lb 12.8 oz)   SpO2 99%   BMI 25.97 kg/m²     Physical Exam  Constitutional:       " General: He is not in acute distress.     Appearance: He is ill-appearing.   Cardiovascular:      Rate and Rhythm: Regular rhythm.      Heart sounds: Normal heart sounds.   Pulmonary:      Breath sounds: Normal breath sounds.   Abdominal:      Palpations: Abdomen is soft.      Tenderness: There is no abdominal tenderness.   Musculoskeletal:      Cervical back: Neck supple.       Lea Reyes, MD

## 2024-04-18 ENCOUNTER — CONSULT (OUTPATIENT)
Dept: UROLOGY | Facility: CLINIC | Age: 69
End: 2024-04-18
Payer: COMMERCIAL

## 2024-04-18 VITALS
DIASTOLIC BLOOD PRESSURE: 80 MMHG | HEART RATE: 65 BPM | WEIGHT: 171 LBS | OXYGEN SATURATION: 95 % | SYSTOLIC BLOOD PRESSURE: 150 MMHG | BODY MASS INDEX: 25.91 KG/M2 | HEIGHT: 68 IN

## 2024-04-18 DIAGNOSIS — Z12.5 SCREENING FOR PROSTATE CANCER: ICD-10-CM

## 2024-04-18 DIAGNOSIS — R31.0 GROSS HEMATURIA: Primary | ICD-10-CM

## 2024-04-18 DIAGNOSIS — N47.1 PHIMOSIS OF PENIS: ICD-10-CM

## 2024-04-18 LAB
AMORPH URATE CRY URNS QL MICRO: ABNORMAL
BACTERIA UR QL AUTO: ABNORMAL /HPF
BILIRUB UR QL STRIP: NEGATIVE
CLARITY UR: ABNORMAL
COLOR UR: YELLOW
GLUCOSE UR STRIP-MCNC: ABNORMAL MG/DL
HGB UR QL STRIP.AUTO: ABNORMAL
KETONES UR STRIP-MCNC: NEGATIVE MG/DL
LEUKOCYTE ESTERASE UR QL STRIP: ABNORMAL
MUCOUS THREADS UR QL AUTO: ABNORMAL
NITRITE UR QL STRIP: POSITIVE
NON-SQ EPI CELLS URNS QL MICRO: ABNORMAL /HPF
PH UR STRIP.AUTO: 6 [PH]
PROT UR STRIP-MCNC: ABNORMAL MG/DL
RBC #/AREA URNS AUTO: ABNORMAL /HPF
SP GR UR STRIP.AUTO: 1.03 (ref 1–1.03)
UROBILINOGEN UR STRIP-ACNC: <2 MG/DL
WBC #/AREA URNS AUTO: ABNORMAL /HPF

## 2024-04-18 PROCEDURE — 87186 SC STD MICRODIL/AGAR DIL: CPT

## 2024-04-18 PROCEDURE — 87077 CULTURE AEROBIC IDENTIFY: CPT

## 2024-04-18 PROCEDURE — 87086 URINE CULTURE/COLONY COUNT: CPT

## 2024-04-18 PROCEDURE — 88112 CYTOPATH CELL ENHANCE TECH: CPT | Performed by: PATHOLOGY

## 2024-04-18 PROCEDURE — 99203 OFFICE O/P NEW LOW 30 MIN: CPT

## 2024-04-18 PROCEDURE — 81001 URINALYSIS AUTO W/SCOPE: CPT

## 2024-04-18 NOTE — PROGRESS NOTES
Office Visit- Urology  Bg Gruber 1955 MRN: [id number]      Assessment/Discussion/Plan    68 y.o. male managed by     Concern for phimosis  -Phimosis is not apparent on examination today    2. Gross Hematuria   -Negative urine culture  -Significant smoking history  -Will send out urine for cytology  -CT urogram and cystoscopy for full workup.  Patient is in agreement    3.  Prostate cancer screening  -Last PSA was 0.2 in June 2021  -YEMI at time of cystoscopy.  Patient defers today      Chief Complaint:   Bg is a 68 y.o. male presenting to the office for gross hematuria        Subjective    Patient is a 68-year-old male with past medical history significant for poorly controlled diabetes mellitus and cirrhosis of the liver presents to the office today for evaluation of hematuria.  Patient has symptoms of dysuria and frequency is had urine culture positive for Staphylococcus  in February 2024.  He had a subsequent urine culture in March 2024 but this did not demonstrate growth of bacteria and a few days after this he experienced another episode of gross hematuria.  He has a significant smoking history having smoked about 30 years.  Patient also states that his symptoms have some penile irritation and was felt they may be had phimosis by PCP but this was not evident on examination today.  Patient has persistent dysuria      ROS:   Review of Systems   Constitutional: Negative.  Negative for chills, fatigue and fever.   HENT: Negative.     Respiratory:  Negative for shortness of breath.    Cardiovascular:  Negative for chest pain.   Gastrointestinal: Negative.  Negative for abdominal pain.   Endocrine: Negative.    Musculoskeletal: Negative.    Skin: Negative.    Neurological: Negative.  Negative for dizziness and light-headedness.   Hematological: Negative.    Psychiatric/Behavioral: Negative.           Past Medical History  Past Medical History:   Diagnosis Date    Abnormal liver function test      LAST ASSESSED: 13JUN2012    Anxiety     Athlete's foot     LAST ASSESSED: 08MAY2013    Callus     LAST ASSESSED: 08MAY2013    Carpal tunnel syndrome, unspecified upper limb     LAST ASSESSED: 02OCT2012    Chronic pain syndrome     LAST ASSESSED: 22OCT2013    Colon polyp     Diabetes mellitus (HCC)     Diabetes mellitus due to underlying condition, uncontrolled, with hyperglycemia ([facility]) 7/25/2016    Transitioned From: Diabetes mellitus type 2, uncontrolled    Esophageal varices without bleeding, unspecified esophageal varices type (HCC) 3/27/2023    Herpes zoster ophthalmicus of right eye     LAST ASSESSED: 10NOV2016    Hyperglycemia     LAST ASSESSED: 08MAY2013    Hyperplastic colon polyp     Obesity 5/22/2017    Pericardial effusion     LAST ASSESSED: 13JUN2012    Positive colorectal cancer screening using Cologuard test 6/8/2021    Right lower quadrant abdominal pain 6/8/2021       Past Surgical History  Past Surgical History:   Procedure Laterality Date    COLONOSCOPY      HEMORROIDECTOMY      LUMBAR LAMINECTOMY      LAST ASSESSED: 07APR2015    TONSILLECTOMY AND ADENOIDECTOMY      WISDOM TOOTH EXTRACTION         Past Family History  Family History   Problem Relation Age of Onset    No Known Problems Father     Hypotension Family     Prostate cancer Family     Coronary artery disease Family     Diabetes Family     Diverticulitis Mother     Breast cancer Mother        Past Social history  Social History     Socioeconomic History    Marital status: Single     Spouse name: Not on file    Number of children: Not on file    Years of education: Not on file    Highest education level: Not on file   Occupational History    Not on file   Tobacco Use    Smoking status: Every Day     Current packs/day: 0.50     Types: Cigarettes    Smokeless tobacco: Never   Vaping Use    Vaping status: Never Used   Substance and Sexual Activity    Alcohol use: Yes     Comment: ocasional    Drug use: No    Sexual activity: Not on file    Other Topics Concern    Not on file   Social History Narrative    Not on file     Social Determinants of Health     Financial Resource Strain: Not on file   Food Insecurity: Not on file   Transportation Needs: Not on file   Physical Activity: Not on file   Stress: Not on file   Social Connections: Not on file   Intimate Partner Violence: Not on file   Housing Stability: Not on file       Current Medications  Current Outpatient Medications   Medication Sig Dispense Refill    atorvastatin (LIPITOR) 20 mg tablet TAKE 1 TABLET BY MOUTH EVERY DAY 90 tablet 3    B-D UF III MINI PEN NEEDLES 31G X 5 MM MISC BY DOES NOT APPLY ROUTE DAILY TO INJECT INSULIN 100 each 6    cholecalciferol (VITAMIN D3) 1,000 units tablet Take 1 tablet (1,000 Units total) by mouth daily      Empagliflozin (Jardiance) 10 MG TABS tablet TAKE 1 TABLET BY MOUTH EVERY DAY 30 tablet 5    escitalopram (LEXAPRO) 20 mg tablet TAKE 1 TABLET BY MOUTH EVERY DAY 90 tablet 0    glucose blood (OneTouch Ultra) test strip Use 1 each 3 (three) times a day Use as instructed 300 strip 1    Lancets (OneTouch Delica Plus [id number]) MISC TEST DAILY 100 each 1    metFORMIN (GLUCOPHAGE-XR) 500 mg 24 hr tablet Take 2 tablets (1,000 mg total) by mouth 2 (two) times a day with meals For diabetes 360 tablet 1    metoprolol succinate (TOPROL-XL) 100 mg 24 hr tablet Take 1 tablet (100 mg total) by mouth daily 90 tablet 0    Toujeo SoloStar 300 units/mL CONCENTRATED U-300 injection pen (1-unit dial) INJECT 40 UNITS UNDER THE SKIN DAILY 13.5 mL 1     No current facility-administered medications for this visit.       Allergies  Allergies   Allergen Reactions    Duloxetine Hcl Other (See Comments)    Lisinopril Cough    Olmesartan Medoxomil-Hctz      Annotation - [id number]: Abnormal blood levels due to med       OBJECTIVE    Vitals   Vitals:    04/18/24 1146   BP: 150/80   BP Location: Left arm   Patient Position: Sitting   Cuff Size: Adult   Pulse: 65   SpO2: 95%   Weight: 77.6  "kg (171 lb)   Height: 5' 8\" (1.727 m)       PVR:    Physical Exam  Constitutional:       General: He is not in acute distress.     Appearance: Normal appearance. He is normal weight. He is not ill-appearing or toxic-appearing.   HENT:      Head: Normocephalic and atraumatic.   Eyes:      Conjunctiva/sclera: Conjunctivae normal.   Cardiovascular:      Rate and Rhythm: Normal rate.   Pulmonary:      Effort: Pulmonary effort is normal. No respiratory distress.   Genitourinary:     Penis: Normal. No phimosis, erythema, discharge, swelling or lesions.    Skin:     General: Skin is warm and dry.   Neurological:      General: No focal deficit present.      Mental Status: He is alert and oriented to person, place, and time.      Cranial Nerves: No cranial nerve deficit.   Psychiatric:         Mood and Affect: Mood normal.         Behavior: Behavior normal.         Thought Content: Thought content normal.          Labs:     Lab Results   Component Value Date    PSA 0.2 06/11/2021     Lab Results   Component Value Date    CREATININE 0.85 03/13/2024      Lab Results   Component Value Date    HGBA1C 11.1 (H) 03/13/2024     Lab Results   Component Value Date    GLUCOSE 135 04/20/2015    CALCIUM 9.1 03/13/2024     04/20/2015    K 4.2 03/13/2024    CO2 22 03/13/2024     03/13/2024    BUN 21 03/13/2024    CREATININE 0.85 03/13/2024       I have personally reviewed all pertinent lab results and reviewed with patient    Imaging       Sam Benoit PA-C  Date: 4/18/2024 Time: 11:58 AM  [facility]    This note was written using fluency dictation software. Please excuse any resulting minor grammatical errors.      "

## 2024-04-19 ENCOUNTER — TELEPHONE (OUTPATIENT)
Dept: UROLOGY | Facility: CLINIC | Age: 69
End: 2024-04-19

## 2024-04-19 DIAGNOSIS — N30.90 CYSTITIS: Primary | ICD-10-CM

## 2024-04-19 RX ORDER — CEFDINIR 300 MG/1
300 CAPSULE ORAL EVERY 12 HOURS SCHEDULED
Qty: 14 CAPSULE | Refills: 0 | Status: SHIPPED | OUTPATIENT
Start: 2024-04-19 | End: 2024-04-26

## 2024-04-19 NOTE — TELEPHONE ENCOUNTER
Please call patient to inform him that his urine testing is concerning for infection with positive nitrates and leukocytes.  Urine culture is not back yet.  Because of patient's persistent dysuria and upcoming weekend I will send empiric antibiotics to the pharmacy.  7-day course of cefdinir sent to pharmacy please monitor for urine culture results may have to change based on final susceptibility report

## 2024-04-19 NOTE — TELEPHONE ENCOUNTER
Call placed to patient and spoke with him. Informed him of the AP recommendations and instructions at this time. Pt is aware and will take medication as prescribed.     Will postpone message to monitor for final UC results.

## 2024-04-20 LAB — BACTERIA UR CULT: ABNORMAL

## 2024-04-22 PROCEDURE — 88112 CYTOPATH CELL ENHANCE TECH: CPT | Performed by: PATHOLOGY

## 2024-04-22 NOTE — TELEPHONE ENCOUNTER
Called patient - he is told that he is on the correct antibiotics.  He had no further questions or concerns at this time.

## 2024-04-22 NOTE — TELEPHONE ENCOUNTER
Urine culture finalized. Pls advise.   Crescentic Advancement Flap Text: The defect edges were debeveled with a #15 scalpel blade.  Given the location of the defect and the proximity to free margins a crescentic advancement flap was deemed most appropriate.  Using a sterile surgical marker, the appropriate advancement flap was drawn incorporating the defect and placing the expected incisions within the relaxed skin tension lines where possible.    The area thus outlined was incised deep to adipose tissue with a #15 scalpel blade.  The skin margins were undermined to an appropriate distance in all directions utilizing iris scissors.

## 2024-04-27 NOTE — ASSESSMENT & PLAN NOTE
Saw GI in 2021 and did not f/u after EGD/colonoscopy  Reestablish with GI  Dr Lopez whom he saw for his right inguinal hernia already placed an order for an elastography

## 2024-04-27 NOTE — ASSESSMENT & PLAN NOTE
He was poorly compliant to meds and diet until he followed up in March after not being seen for > 1 year  Recent FBS <150 and HS 180s, no hypoglycemia  He would like to try metformin 2 g a day  Continue Jardiance and Patel  Lab Results   Component Value Date    HGBA1C 11.1 (H) 03/13/2024

## 2024-05-10 ENCOUNTER — TELEPHONE (OUTPATIENT)
Age: 69
End: 2024-05-10

## 2024-05-10 DIAGNOSIS — E11.65 POORLY CONTROLLED DIABETES MELLITUS (HCC): ICD-10-CM

## 2024-05-10 RX ORDER — METOPROLOL SUCCINATE 100 MG/1
100 TABLET, EXTENDED RELEASE ORAL DAILY
Qty: 90 TABLET | Refills: 1 | Status: SHIPPED | OUTPATIENT
Start: 2024-05-10

## 2024-05-10 NOTE — TELEPHONE ENCOUNTER
Patient has CT scan scheduled for 5/16 and had questions about bloodwork.    Advised patient that  orders for PSA and BMP are in his chart and he plans to have these drawn Monday 5/13.    No further questions

## 2024-05-13 ENCOUNTER — APPOINTMENT (OUTPATIENT)
Dept: LAB | Facility: CLINIC | Age: 69
End: 2024-05-13
Payer: COMMERCIAL

## 2024-05-13 DIAGNOSIS — R74.8 ELEVATED ALKALINE PHOSPHATASE LEVEL: ICD-10-CM

## 2024-05-13 DIAGNOSIS — R31.0 GROSS HEMATURIA: ICD-10-CM

## 2024-05-13 DIAGNOSIS — Z79.4 INSULIN LONG-TERM USE (HCC): ICD-10-CM

## 2024-05-13 DIAGNOSIS — K74.60 CIRRHOSIS OF LIVER WITHOUT ASCITES, UNSPECIFIED HEPATIC CIRRHOSIS TYPE (HCC): ICD-10-CM

## 2024-05-13 DIAGNOSIS — K74.60 CIRRHOSIS (HCC): ICD-10-CM

## 2024-05-13 DIAGNOSIS — E11.65 POORLY CONTROLLED DIABETES MELLITUS (HCC): ICD-10-CM

## 2024-05-13 DIAGNOSIS — E11.9 DIABETES (HCC): ICD-10-CM

## 2024-05-13 DIAGNOSIS — Z12.5 SCREENING FOR PROSTATE CANCER: ICD-10-CM

## 2024-05-13 DIAGNOSIS — I10 BENIGN ESSENTIAL HYPERTENSION: ICD-10-CM

## 2024-05-13 DIAGNOSIS — E55.9 VITAMIN D DEFICIENCY: ICD-10-CM

## 2024-05-13 LAB
ANION GAP SERPL CALCULATED.3IONS-SCNC: 9 MMOL/L (ref 4–13)
BUN SERPL-MCNC: 23 MG/DL (ref 5–25)
CALCIUM SERPL-MCNC: 8.9 MG/DL (ref 8.4–10.2)
CHLORIDE SERPL-SCNC: 109 MMOL/L (ref 96–108)
CO2 SERPL-SCNC: 24 MMOL/L (ref 21–32)
CREAT SERPL-MCNC: 0.78 MG/DL (ref 0.6–1.3)
GFR SERPL CREATININE-BSD FRML MDRD: 93 ML/MIN/1.73SQ M
GLUCOSE P FAST SERPL-MCNC: 133 MG/DL (ref 65–99)
POTASSIUM SERPL-SCNC: 4.5 MMOL/L (ref 3.5–5.3)
PSA SERPL-MCNC: 0.52 NG/ML (ref 0–4)
SODIUM SERPL-SCNC: 142 MMOL/L (ref 135–147)

## 2024-05-13 PROCEDURE — G0103 PSA SCREENING: HCPCS

## 2024-05-13 PROCEDURE — 36415 COLL VENOUS BLD VENIPUNCTURE: CPT

## 2024-05-13 PROCEDURE — 80048 BASIC METABOLIC PNL TOTAL CA: CPT

## 2024-05-16 ENCOUNTER — HOSPITAL ENCOUNTER (OUTPATIENT)
Dept: RADIOLOGY | Facility: HOSPITAL | Age: 69
Discharge: HOME | End: 2024-05-16
Payer: COMMERCIAL

## 2024-05-16 DIAGNOSIS — R31.0 GROSS HEMATURIA: ICD-10-CM

## 2024-05-16 PROCEDURE — 74178 CT ABD&PLV WO CNTR FLWD CNTR: CPT

## 2024-05-16 RX ADMIN — IOHEXOL 85 ML: 350 INJECTION, SOLUTION INTRAVENOUS at 07:31

## 2024-05-20 DIAGNOSIS — E08.65 DIABETES MELLITUS DUE TO UNDERLYING CONDITION, UNCONTROLLED, WITH HYPERGLYCEMIA (HCC): ICD-10-CM

## 2024-05-20 RX ORDER — FLURBIPROFEN SODIUM 0.3 MG/ML
SOLUTION/ DROPS OPHTHALMIC DAILY
Qty: 100 EACH | Refills: 6 | Status: SHIPPED | OUTPATIENT
Start: 2024-05-20

## 2024-05-28 ENCOUNTER — TELEPHONE (OUTPATIENT)
Dept: UROLOGY | Facility: AMBULATORY SURGERY CENTER | Age: 69
End: 2024-05-28

## 2024-05-28 NOTE — TELEPHONE ENCOUNTER
Can you please call Bg and let him know that I reviewed his most recent CT scan.  He has one 5 mm kidney stone on the right side and no kidney stones on the left side.  There is nothing suspicious or remarkable in his kidneys.  It does appear that he has an enlarged prostate that may be contributing to some urinary retention that can cause bladder wall thickening.  He does have a cystoscopy scheduled in August with Dr. Clemente and should plan to follow-up at that time for further evaluation of his prostate and gross hematuria.

## 2024-05-28 NOTE — TELEPHONE ENCOUNTER
Spoke with pt about note from Kareen Pt aware and thankful for call. Will come to Cysto apt with ronan in August. Advised to call with any questions or concerns in the mean time.

## 2024-06-06 ENCOUNTER — TELEPHONE (OUTPATIENT)
Age: 69
End: 2024-06-06

## 2024-06-06 NOTE — TELEPHONE ENCOUNTER
Patient called he wants to know if he still need his ultrasound. I called the office talked to Katy, she is going to ask Dr Lopez and will call patient back. Pt aware.

## 2024-06-13 ENCOUNTER — APPOINTMENT (OUTPATIENT)
Dept: LAB | Facility: CLINIC | Age: 69
End: 2024-06-13
Payer: COMMERCIAL

## 2024-06-13 DIAGNOSIS — K74.60 HEPATIC CIRRHOSIS, UNSPECIFIED HEPATIC CIRRHOSIS TYPE, UNSPECIFIED WHETHER ASCITES PRESENT (HCC): ICD-10-CM

## 2024-06-13 LAB
25(OH)D3 SERPL-MCNC: 39 NG/ML (ref 30–100)
AFP-TM SERPL-MCNC: 1.86 NG/ML (ref 0–9)
ALBUMIN SERPL BCP-MCNC: 3.6 G/DL (ref 3.5–5)
ALP SERPL-CCNC: 213 U/L (ref 34–104)
ALT SERPL W P-5'-P-CCNC: 16 U/L (ref 7–52)
ANION GAP SERPL CALCULATED.3IONS-SCNC: 10 MMOL/L (ref 4–13)
AST SERPL W P-5'-P-CCNC: 20 U/L (ref 13–39)
BASOPHILS # BLD AUTO: 0.07 THOUSANDS/ÂΜL (ref 0–0.1)
BASOPHILS NFR BLD AUTO: 1 % (ref 0–1)
BILIRUB SERPL-MCNC: 0.92 MG/DL (ref 0.2–1)
BUN SERPL-MCNC: 19 MG/DL (ref 5–25)
CALCIUM SERPL-MCNC: 9.1 MG/DL (ref 8.4–10.2)
CHLORIDE SERPL-SCNC: 106 MMOL/L (ref 96–108)
CO2 SERPL-SCNC: 24 MMOL/L (ref 21–32)
CREAT SERPL-MCNC: 0.71 MG/DL (ref 0.6–1.3)
EOSINOPHIL # BLD AUTO: 0.07 THOUSAND/ÂΜL (ref 0–0.61)
EOSINOPHIL NFR BLD AUTO: 1 % (ref 0–6)
ERYTHROCYTE [DISTWIDTH] IN BLOOD BY AUTOMATED COUNT: 15 % (ref 11.6–15.1)
EST. AVERAGE GLUCOSE BLD GHB EST-MCNC: 154 MG/DL
GFR SERPL CREATININE-BSD FRML MDRD: 97 ML/MIN/1.73SQ M
GGT SERPL-CCNC: 213 U/L (ref 9–64)
GLUCOSE P FAST SERPL-MCNC: 121 MG/DL (ref 65–99)
HAV IGM SER QL: NORMAL
HBA1C MFR BLD: 7 %
HBV CORE IGM SER QL: NORMAL
HBV SURFACE AG SER QL: NORMAL
HCT VFR BLD AUTO: 41.3 % (ref 36.5–49.3)
HCV AB SER QL: NORMAL
HGB BLD-MCNC: 13.5 G/DL (ref 12–17)
IMM GRANULOCYTES # BLD AUTO: 0.01 THOUSAND/UL (ref 0–0.2)
IMM GRANULOCYTES NFR BLD AUTO: 0 % (ref 0–2)
INR PPP: 1.16 (ref 0.84–1.19)
LYMPHOCYTES # BLD AUTO: 1.3 THOUSANDS/ÂΜL (ref 0.6–4.47)
LYMPHOCYTES NFR BLD AUTO: 26 % (ref 14–44)
MCH RBC QN AUTO: 33.4 PG (ref 26.8–34.3)
MCHC RBC AUTO-ENTMCNC: 32.7 G/DL (ref 31.4–37.4)
MCV RBC AUTO: 102 FL (ref 82–98)
MONOCYTES # BLD AUTO: 0.35 THOUSAND/ÂΜL (ref 0.17–1.22)
MONOCYTES NFR BLD AUTO: 7 % (ref 4–12)
NEUTROPHILS # BLD AUTO: 3.14 THOUSANDS/ÂΜL (ref 1.85–7.62)
NEUTS SEG NFR BLD AUTO: 65 % (ref 43–75)
NRBC BLD AUTO-RTO: 0 /100 WBCS
PLATELET # BLD AUTO: 109 THOUSANDS/UL (ref 149–390)
PMV BLD AUTO: 10.6 FL (ref 8.9–12.7)
POTASSIUM SERPL-SCNC: 4.3 MMOL/L (ref 3.5–5.3)
PROT SERPL-MCNC: 7.5 G/DL (ref 6.4–8.4)
PROTHROMBIN TIME: 14.7 SECONDS (ref 11.6–14.5)
RBC # BLD AUTO: 4.04 MILLION/UL (ref 3.88–5.62)
SODIUM SERPL-SCNC: 140 MMOL/L (ref 135–147)
WBC # BLD AUTO: 4.94 THOUSAND/UL (ref 4.31–10.16)

## 2024-06-13 PROCEDURE — 83036 HEMOGLOBIN GLYCOSYLATED A1C: CPT

## 2024-06-13 PROCEDURE — 80053 COMPREHEN METABOLIC PANEL: CPT

## 2024-06-13 PROCEDURE — 82306 VITAMIN D 25 HYDROXY: CPT

## 2024-06-13 PROCEDURE — 82105 ALPHA-FETOPROTEIN SERUM: CPT

## 2024-06-13 PROCEDURE — 80074 ACUTE HEPATITIS PANEL: CPT

## 2024-06-13 PROCEDURE — 85610 PROTHROMBIN TIME: CPT

## 2024-06-13 PROCEDURE — 85025 COMPLETE CBC W/AUTO DIFF WBC: CPT

## 2024-06-13 PROCEDURE — 82977 ASSAY OF GGT: CPT

## 2024-06-15 ENCOUNTER — NURSE TRIAGE (OUTPATIENT)
Dept: OTHER | Facility: OTHER | Age: 69
End: 2024-06-15

## 2024-06-15 DIAGNOSIS — E11.65 POORLY CONTROLLED DIABETES MELLITUS (HCC): ICD-10-CM

## 2024-06-15 RX ORDER — INSULIN GLARGINE 300 U/ML
40 INJECTION, SOLUTION SUBCUTANEOUS DAILY
Qty: 13.5 ML | Refills: 1 | Status: SHIPPED | OUTPATIENT
Start: 2024-06-15

## 2024-06-15 RX ORDER — METOPROLOL SUCCINATE 100 MG/1
100 TABLET, EXTENDED RELEASE ORAL DAILY
Qty: 90 TABLET | Refills: 0 | Status: SHIPPED | OUTPATIENT
Start: 2024-06-15

## 2024-06-15 NOTE — TELEPHONE ENCOUNTER
"Reason for Disposition  • [1] Prescription prescribed recently is not at pharmacy AND [2] triager has access to patient's EMR AND [3] prescription is recorded in the EMR    Answer Assessment - Initial Assessment Questions  1. DRUG NAME: \"What medicine do you need to have refilled?\"      Metoprolol 100mg tablets, 1 tab daily  2. REFILLS REMAINING: \"How many refills are remaining?\" (Note: The label on the medicine or pill bottle will show how many refills are remaining. If there are no refills remaining, then a renewal may be needed.)      Patient notes the pharmacy says he has 0 refills. His current pill bottle was from Feb 2024    4. PRESCRIBING HCP: \"Who prescribed it?\" Reason: If prescribed by specialist, call should be referred to that group.      Dr. Reyes    Protocols used: Medication Refill and Renewal Call-ADULT-    "

## 2024-06-15 NOTE — TELEPHONE ENCOUNTER
Regarding: metoprolol refill  ----- Message from Maegan EMERY sent at 6/15/2024  2:51 PM EDT -----  Name metoprolol succinate (TOPROL-XL)  Dose/Frequency 100 mg / daily  Quantity 90 tablet  Verified pharmacy   [x ]  Verified ordering Provider   [x ]  Does patient have enough for the next 3 days? Yes [ ] No [x ]

## 2024-06-21 ENCOUNTER — TELEPHONE (OUTPATIENT)
Age: 69
End: 2024-06-21

## 2024-06-21 NOTE — TELEPHONE ENCOUNTER
Pt stated he had a missed call today from the office, but did not listen to a voicemail. Did not see anything in regards to a call, determined may have be to confirm appt on 6/27.

## 2024-06-26 ENCOUNTER — HOSPITAL ENCOUNTER (OUTPATIENT)
Dept: RADIOLOGY | Facility: HOSPITAL | Age: 69
Discharge: HOME | End: 2024-06-26
Attending: SURGERY
Payer: COMMERCIAL

## 2024-06-26 DIAGNOSIS — K74.60 CIRRHOSIS (HCC): ICD-10-CM

## 2024-06-26 PROCEDURE — 76981 USE PARENCHYMA: CPT

## 2024-06-27 ENCOUNTER — OFFICE VISIT (OUTPATIENT)
Dept: INTERNAL MEDICINE CLINIC | Facility: CLINIC | Age: 69
End: 2024-06-27
Payer: COMMERCIAL

## 2024-06-27 VITALS
HEART RATE: 80 BPM | WEIGHT: 173 LBS | SYSTOLIC BLOOD PRESSURE: 152 MMHG | HEIGHT: 68 IN | DIASTOLIC BLOOD PRESSURE: 84 MMHG | TEMPERATURE: 97 F | BODY MASS INDEX: 26.22 KG/M2 | OXYGEN SATURATION: 100 % | RESPIRATION RATE: 18 BRPM

## 2024-06-27 DIAGNOSIS — F41.1 GENERALIZED ANXIETY DISORDER: ICD-10-CM

## 2024-06-27 DIAGNOSIS — I10 BENIGN ESSENTIAL HYPERTENSION: ICD-10-CM

## 2024-06-27 DIAGNOSIS — D69.6 PLATELETS DECREASED (HCC): ICD-10-CM

## 2024-06-27 DIAGNOSIS — K74.60 CIRRHOSIS OF LIVER WITHOUT ASCITES, UNSPECIFIED HEPATIC CIRRHOSIS TYPE (HCC): ICD-10-CM

## 2024-06-27 DIAGNOSIS — E11.65 POORLY CONTROLLED DIABETES MELLITUS (HCC): Primary | ICD-10-CM

## 2024-06-27 PROCEDURE — 99214 OFFICE O/P EST MOD 30 MIN: CPT | Performed by: INTERNAL MEDICINE

## 2024-06-27 PROCEDURE — G2211 COMPLEX E/M VISIT ADD ON: HCPCS | Performed by: INTERNAL MEDICINE

## 2024-06-27 RX ORDER — AMLODIPINE BESYLATE 5 MG/1
5 TABLET ORAL DAILY
Qty: 30 TABLET | Refills: 5 | Status: SHIPPED | OUTPATIENT
Start: 2024-06-27

## 2024-06-27 RX ORDER — INSULIN GLARGINE 300 U/ML
40 INJECTION, SOLUTION SUBCUTANEOUS DAILY
Qty: 4.5 ML | Refills: 5 | Status: SHIPPED | OUTPATIENT
Start: 2024-06-27

## 2024-06-27 RX ORDER — METFORMIN HYDROCHLORIDE 500 MG/1
1000 TABLET, EXTENDED RELEASE ORAL
Start: 2024-06-27

## 2024-06-27 NOTE — PROGRESS NOTES
Ambulatory Visit  Name: Bg Gruber      : 1955      MRN: [id number]  Encounter Provider: Lea Reyes, MD  Encounter Date: 2024   [facility]    Assessment & Plan   1. Poorly controlled diabetes mellitus (HCC)  Assessment & Plan:  Improved, continue current regimen-Tersiba 40units metformin 1g a day Jardiance 10mg  Scheduled for eye exam with Dr Toro in 2 weeks  Lab Results   Component Value Date    HGBA1C 7.0 (H) 2024     Orders:  -     metFORMIN (GLUCOPHAGE-XR) 500 mg 24 hr tablet; Take 2 tablets (1,000 mg total) by mouth daily with dinner For diabetes  -     insulin glargine (Toujeo SoloStar) 300 units/mL CONCENTRATED U-300 injection pen (1-unit dial); Inject 40 Units under the skin daily  -     Hemoglobin A1C; Future; Expected date: 2024  -     Comprehensive metabolic panel; Future; Expected date: 2024  -     CBC and differential; Future; Expected date: 2024  -     Albumin / creatinine urine ratio; Future; Expected date: 2024  -     Lipid Panel with Direct LDL reflex; Future; Expected date: 2024  2. Benign essential hypertension  Assessment & Plan:  Add amlodipine 5mg  Continue metoprolol  Cough from ACE inh, rise in cr from ARB  Orders:  -     amLODIPine (NORVASC) 5 mg tablet; Take 1 tablet (5 mg total) by mouth daily  -     Comprehensive metabolic panel; Future; Expected date: 2024  -     CBC and differential; Future; Expected date: 2024  3. Cirrhosis of liver without ascites, unspecified hepatic cirrhosis type (HCC)  Assessment & Plan:  Awaiting elastography  GI appt coming up  Orders:  -     Comprehensive metabolic panel; Future; Expected date: 2024  4. Platelets decreased (HCC)  Assessment & Plan:  Due to cirrhosis and will monitor  Orders:  -     CBC and differential; Future; Expected date: 2024  5. Generalized anxiety disorder  Assessment & Plan:  Rarely takes Lexapro so advised  to stop completely  Declines Prevnar 20 today         History of Present Illness     Here for a follow up  FBS , ave 120-140  High when he has a late snack  A1C down to 7 from 11        Review of Systems   Constitutional:  Positive for unexpected weight change (weight gain).   Respiratory:  Negative for shortness of breath.    Cardiovascular:  Negative for chest pain and palpitations.   Gastrointestinal:  Negative for constipation and diarrhea.   Endocrine: Negative for polydipsia and polyuria.   Neurological:  Negative for dizziness and headaches.     Past Medical History:   Diagnosis Date   • Abnormal liver function test     LAST ASSESSED: 13JUN2012   • Anxiety    • Athlete's foot     LAST ASSESSED: 08MAY2013   • Callus     LAST ASSESSED: 08MAY2013   • Carpal tunnel syndrome, unspecified upper limb     LAST ASSESSED: 02OCT2012   • Chronic pain syndrome     LAST ASSESSED: 22OCT2013   • Colon polyp    • Diabetes mellitus (HCC)    • Diabetes mellitus due to underlying condition, uncontrolled, with hyperglycemia (HCC) 7/25/2016    Transitioned From: Diabetes mellitus type 2, uncontrolled   • Esophageal varices without bleeding, unspecified esophageal varices type (HCC) 3/27/2023   • Herpes zoster ophthalmicus of right eye     LAST ASSESSED: 10NOV2016   • Hyperglycemia     LAST ASSESSED: 08MAY2013   • Hyperplastic colon polyp    • Obesity 5/22/2017   • Pericardial effusion     LAST ASSESSED: 13JUN2012   • Positive colorectal cancer screening using Cologuard test 6/8/2021   • Right lower quadrant abdominal pain 6/8/2021     Past Surgical History:   Procedure Laterality Date   • COLONOSCOPY     • HEMORROIDECTOMY     • LUMBAR LAMINECTOMY      LAST ASSESSED: 07APR2015   • TONSILLECTOMY AND ADENOIDECTOMY     • WISDOM TOOTH EXTRACTION       Family History   Problem Relation Age of Onset   • No Known Problems Father    • Hypotension Family    • Prostate cancer Family    • Coronary artery disease Family    • Diabetes  Family    • Diverticulitis Mother    • Breast cancer Mother      Social History     Tobacco Use   • Smoking status: Every Day     Current packs/day: 0.50     Types: Cigarettes   • Smokeless tobacco: Never   Vaping Use   • Vaping status: Never Used   Substance and Sexual Activity   • Alcohol use: Yes     Comment: ocasional   • Drug use: No   • Sexual activity: Not on file     Current Outpatient Medications on File Prior to Visit   Medication Sig   • atorvastatin (LIPITOR) 20 mg tablet TAKE 1 TABLET BY MOUTH EVERY DAY   • cholecalciferol (VITAMIN D3) 1,000 units tablet Take 1 tablet (1,000 Units total) by mouth daily   • Empagliflozin (Jardiance) 10 MG TABS tablet TAKE 1 TABLET BY MOUTH EVERY DAY   • glucose blood (OneTouch Ultra) test strip Use 1 each 3 (three) times a day Use as instructed   • Insulin Pen Needle (B-D UF III MINI PEN NEEDLES) 31G X 5 MM MISC Use daily   • Lancets (OneTouch Delica Plus [id number]) MISC TEST DAILY   • metoprolol succinate (TOPROL-XL) 100 mg 24 hr tablet Take 1 tablet (100 mg total) by mouth daily   • [DISCONTINUED] insulin glargine (Toujeo SoloStar) 300 units/mL CONCENTRATED U-300 injection pen (1-unit dial) INJECT 40 UNITS UNDER THE SKIN DAILY   • [DISCONTINUED] metFORMIN (GLUCOPHAGE-XR) 500 mg 24 hr tablet Take 2 tablets (1,000 mg total) by mouth 2 (two) times a day with meals For diabetes   • [DISCONTINUED] escitalopram (LEXAPRO) 20 mg tablet TAKE 1 TABLET BY MOUTH EVERY DAY     Allergies   Allergen Reactions   • Duloxetine Hcl Other (See Comments)   • Lisinopril Cough   • Olmesartan Medoxomil-Hctz      Annotation - 13Jun2012: Abnormal blood levels due to med     Immunization History   Administered Date(s) Administered   • COVID-19 PFIZER VACCINE 0.3 ML IM 04/14/2021, 05/06/2021, 12/20/2021   • Influenza Quadrivalent Preservative Free 3 years and older IM 12/29/2015   • Influenza, recombinant, quadrivalent,injectable, preservative free 11/02/2018   • Influenza, seasonal, injectable  "10/02/2012, 05/02/2013, 12/18/2013   • Pneumococcal Polysaccharide PPV23 11/02/2018   • Td (adult), adsorbed 1955   • Tdap 04/10/2019     Objective     /84   Pulse 80   Temp (!) 97 °F (36.1 °C) (Tympanic)   Resp 18   Ht 5' 8\" (1.727 m)   Wt 78.5 kg (173 lb)   SpO2 100%   BMI 26.30 kg/m²     Physical Exam  Vitals and nursing note reviewed.   Constitutional:       General: He is not in acute distress.     Appearance: He is well-developed. He is ill-appearing. He is not toxic-appearing or diaphoretic.   Eyes:      Conjunctiva/sclera: Conjunctivae normal.   Cardiovascular:      Rate and Rhythm: Normal rate and regular rhythm.      Heart sounds: No murmur heard.  Pulmonary:      Effort: Pulmonary effort is normal. No respiratory distress.      Breath sounds: Normal breath sounds.   Abdominal:      Palpations: Abdomen is soft. There is hepatomegaly and splenomegaly.      Tenderness: There is no abdominal tenderness.   Musculoskeletal:      Cervical back: Neck supple.      Right lower leg: No edema.      Left lower leg: No edema.   Neurological:      Mental Status: He is alert.   Psychiatric:         Mood and Affect: Mood normal.         Behavior: Behavior normal.       Administrative Statements         "

## 2024-06-27 NOTE — ASSESSMENT & PLAN NOTE
Improved, continue current regimen-Tersiba 40units metformin 1g a day Jardiance 10mg  Scheduled for eye exam with Dr Toro in 2 weeks  Lab Results   Component Value Date    HGBA1C 7.0 (H) 06/13/2024

## 2024-07-02 ENCOUNTER — TELEPHONE (OUTPATIENT)
Dept: SURGERY | Facility: CLINIC | Age: 69
End: 2024-07-02

## 2024-07-02 DIAGNOSIS — Z79.4 INSULIN LONG-TERM USE (HCC): Primary | ICD-10-CM

## 2024-07-10 ENCOUNTER — ESTABLISHED COMPREHENSIVE EXAM (OUTPATIENT)
Dept: URBAN - METROPOLITAN AREA CLINIC 6 | Facility: CLINIC | Age: 69
End: 2024-07-10

## 2024-07-10 DIAGNOSIS — Z79.4: ICD-10-CM

## 2024-07-10 DIAGNOSIS — E11.9: ICD-10-CM

## 2024-07-10 DIAGNOSIS — H25.813: ICD-10-CM

## 2024-07-10 LAB
LEFT EYE DIABETIC RETINOPATHY: NORMAL
RIGHT EYE DIABETIC RETINOPATHY: NORMAL

## 2024-07-10 PROCEDURE — 92014 COMPRE OPH EXAM EST PT 1/>: CPT

## 2024-07-10 ASSESSMENT — TONOMETRY
OD_IOP_MMHG: 7
OS_IOP_MMHG: 9

## 2024-07-10 ASSESSMENT — VISUAL ACUITY
OS_SC: 20/30-2
OD_SC: 20/30

## 2024-07-12 DIAGNOSIS — I10 BENIGN ESSENTIAL HYPERTENSION: ICD-10-CM

## 2024-07-12 NOTE — TELEPHONE ENCOUNTER
Pt called and states pharmacy never received his amlodipine 5 mg - advised pt it was sent over on 6/27/24 for 30 days with 5 refills - pt states he will contact the pharmacy again to check if they have it and if not will call back.

## 2024-07-16 DIAGNOSIS — I10 BENIGN ESSENTIAL HYPERTENSION: ICD-10-CM

## 2024-07-16 RX ORDER — AMLODIPINE BESYLATE 5 MG/1
5 TABLET ORAL DAILY
Qty: 30 TABLET | Refills: 5 | Status: SHIPPED | OUTPATIENT
Start: 2024-07-16

## 2024-07-16 NOTE — TELEPHONE ENCOUNTER
Patient is at the pharmacy and they do not have this script, he has no more pills , please send as soon as possible.  Looks like was sent on 6/27 but it was NEVER received.   Requested Prescriptions     Pending Prescriptions Disp Refills    amLODIPine (NORVASC) 5 mg tablet 30 tablet 5     Sig: Take 1 tablet (5 mg total) by mouth daily     [facility] #[id number]

## 2024-07-22 ENCOUNTER — OFFICE VISIT (OUTPATIENT)
Dept: SURGERY | Facility: CLINIC | Age: 69
End: 2024-07-22
Payer: COMMERCIAL

## 2024-07-22 VITALS
HEIGHT: 68 IN | DIASTOLIC BLOOD PRESSURE: 82 MMHG | HEART RATE: 69 BPM | BODY MASS INDEX: 25.76 KG/M2 | SYSTOLIC BLOOD PRESSURE: 135 MMHG | WEIGHT: 170 LBS

## 2024-07-22 DIAGNOSIS — K40.90 NON-RECURRENT UNILATERAL INGUINAL HERNIA WITHOUT OBSTRUCTION OR GANGRENE: Primary | ICD-10-CM

## 2024-07-22 PROCEDURE — 99213 OFFICE O/P EST LOW 20 MIN: CPT | Performed by: SURGERY

## 2024-07-22 NOTE — PROGRESS NOTES
Ambulatory Visit  Name: Bg Gruber      : 1955      MRN: [id number]  Encounter Provider: Patrick Lopez MD  Encounter Date: 2024   Encounter department: [facility]    Assessment & Plan  Patient presents with a right inguinal hernia.  Is been present over the last 2 months.  At present he denies pain.  He is able to reduce this easily.  He has purchased a truss in the interim.  This has been helpful.  He plans undergo hernia repair in the fall.     His last hemoglobin A1c is elevated at 11.  I explained that this doubles his risk for postoperative wound infection.  He believes that he can improve as his blood sugars are continuing to improve.  I repeat value at 3 months with a follow-up visit at 4 months was requested.  Patient is agreeable.     There is a history for cirrhosis on a CAT scan 3 years ago.  Also portal gastropathy was noted on upper endoscopy.  I am unsure if this has been worked up to date.  A hepatitis panel and liver elastin ultrasound is been requested-this has been completed and is listed is grade F4.  He denies excessive alcohol use.  This is probably nonalcoholic steatohepatitis secondary to his diabetes.  Patient is due to revisit with his gastroenterologist as he is at an 3-year interval for polyp survey.      History of Present Illness     Bg Gruber is a 68 y.o. male who presents for follow up on right inguinal hernia.    Review of Systems   Constitutional: Negative.  Negative for activity change.   HENT: Negative.     Eyes: Negative.    Respiratory: Negative.     Cardiovascular: Negative.    Gastrointestinal: Negative.    Endocrine: Negative.    Genitourinary: Negative.    Musculoskeletal: Negative.    Skin: Negative.    Allergic/Immunologic: Negative.    Neurological: Negative.    Psychiatric/Behavioral:  Negative for agitation, behavioral problems and confusion. The patient is not nervous/anxious.        Objective     /82   " Pulse 69   Ht 5' 8\" (1.727 m)   Wt 77.1 kg (170 lb)   BMI 25.85 kg/m²     Physical Exam  Vitals and nursing note reviewed.   Constitutional:       General: He is not in acute distress.     Appearance: He is well-developed.   HENT:      Head: Normocephalic and atraumatic.   Eyes:      Conjunctiva/sclera: Conjunctivae normal.   Cardiovascular:      Rate and Rhythm: Normal rate and regular rhythm.      Heart sounds: No murmur heard.  Pulmonary:      Effort: Pulmonary effort is normal. No respiratory distress.      Breath sounds: Normal breath sounds.   Abdominal:      Palpations: Abdomen is soft.      Tenderness: There is no abdominal tenderness.      Hernia: A hernia (Inguinal hernias present.  This is reducible.) is present.   Musculoskeletal:         General: No swelling.      Cervical back: Neck supple.   Skin:     General: Skin is warm and dry.      Capillary Refill: Capillary refill takes less than 2 seconds.   Neurological:      Mental Status: He is alert.   Psychiatric:         Mood and Affect: Mood normal.       Administrative Statements           "

## 2024-07-22 NOTE — ASSESSMENT & PLAN NOTE
Patient Represents for right inguinal hernia.  In the interim he has purchased a hernia belt and this is providing relief of discomfort.  He is working a part-time job and prefers to have hernia repair in the fall.  Overall he feels well.  Risks and benefits of surgery discussed.    He has a history for cirrhosis secondary to diabetes.  Ultrasound elastography is great F4.  He is due to follow-up with his GI doctor in this regard in addition for follow-up survey.  This is causing his platelets to be decreased.  I asked him to do his best to keep his diabetes under best control.  Patient is agreeable.  A follow-up visit in the fall is requested.

## 2024-08-07 ENCOUNTER — PROCEDURE VISIT (OUTPATIENT)
Dept: UROLOGY | Facility: AMBULATORY SURGERY CENTER | Age: 69
End: 2024-08-07
Payer: COMMERCIAL

## 2024-08-07 VITALS
SYSTOLIC BLOOD PRESSURE: 128 MMHG | OXYGEN SATURATION: 100 % | WEIGHT: 170 LBS | HEART RATE: 68 BPM | BODY MASS INDEX: 25.85 KG/M2 | DIASTOLIC BLOOD PRESSURE: 80 MMHG

## 2024-08-07 DIAGNOSIS — R33.9 URINARY RETENTION: ICD-10-CM

## 2024-08-07 DIAGNOSIS — N20.0 RIGHT RENAL STONE: ICD-10-CM

## 2024-08-07 DIAGNOSIS — R31.0 GROSS HEMATURIA: Primary | ICD-10-CM

## 2024-08-07 LAB — POST-VOID RESIDUAL VOLUME, ML POC: 18 ML

## 2024-08-07 PROCEDURE — 51798 US URINE CAPACITY MEASURE: CPT | Performed by: UROLOGY

## 2024-08-07 PROCEDURE — 52000 CYSTOURETHROSCOPY: CPT | Performed by: UROLOGY

## 2024-08-07 NOTE — PROGRESS NOTES
Assessment/Plan:    Right renal stone  Recommend observation for a small nonobstructive renal stone    Gross hematuria  The patient has had a negative hematuria workup with unremarkable CT IVP and negative cytology and cystoscopy today which showed signs of inflammation but no discrete lesions.    I suspect that his prior hematuria was secondary to bladder inflammation from what may be chronic infections related to his diabetes which is now getting under better control      Recommend annual urinalysis with PCP and consideration for repeat workup in 3-5 years if micro hematuria persists.  If the patient has persistent gross hematuria then they should see us immediately.          Subjective:      Patient ID: Bg Gruber is a 68 y.o. male.    HPI    Patient is a 68-year-old male with past medical history significant for poorly controlled diabetes mellitus and cirrhosis of the liver with urologic issues of recurrent urinary tract infections and hematuria.      The patient voices he has intermittent issues of dysuria and frequency.  He has had prior urinary tract infections with the growth of Staphylococcus  in February 2024.  He has also had gross hematuria last in March 2024.      He had a CT IVP in May 2024 which was significant for a nonobstructive right-sided renal stone and diffuse bladder wall thickening with what appears to be an enlarged prostate and evidence of cirrhosis with developing portal hypertension and nonspecific prominent retroperitoneal lymph nodes but otherwise unremarkable.    Urine cytology April 2024 was negative.    Cystoscopy today was significant for floating debris and inflammatory changes within the bladder associated with mild diffuse erythema but no distinct lesions.    He has a significant smoking history having smoked about 30 years.      Patient also states that his symptoms have some penile irritation and was felt they may be had phimosis by PCP but this was not evident on  examination today.     Patient's last PSA was checked in May 2024 and remained low at 0.5    The patient has a history of poorly controlled diabetes although his control is improved significantly in the recent past with his most recent A1c of 7% down from 11 and 13% earlier this year    Past Surgical History:   Procedure Laterality Date    COLONOSCOPY      HEMORROIDECTOMY      LUMBAR LAMINECTOMY      LAST ASSESSED: [id number]    TONSILLECTOMY AND ADENOIDECTOMY      WISDOM TOOTH EXTRACTION          Past Medical History:   Diagnosis Date    Abnormal liver function test     LAST ASSESSED: [id number]    Anxiety     Athlete's foot     LAST ASSESSED: 08MAY2013    Callus     LAST ASSESSED: 08MAY2013    Carpal tunnel syndrome, unspecified upper limb     LAST ASSESSED: 02OCT2012    Chronic pain syndrome     LAST ASSESSED: 22OCT2013    Colon polyp     Diabetes mellitus (HCC)     Diabetes mellitus due to underlying condition, uncontrolled, with hyperglycemia (HCC) 7/25/2016    Transitioned From: Diabetes mellitus type 2, uncontrolled    Esophageal varices without bleeding, unspecified esophageal varices type (HCC) 3/27/2023    Herpes zoster ophthalmicus of right eye     LAST ASSESSED: 10NOV2016    Hyperglycemia     LAST ASSESSED: 08MAY2013    Hyperplastic colon polyp     Obesity 5/22/2017    Pericardial effusion     LAST ASSESSED: 13JUN2012    Positive colorectal cancer screening using Cologuard test 6/8/2021    Right lower quadrant abdominal pain 6/8/2021             Review of Systems   Constitutional:  Negative for chills and fever.   HENT:  Negative for ear pain and sore throat.    Eyes:  Negative for pain and visual disturbance.   Respiratory:  Negative for cough and shortness of breath.    Cardiovascular:  Negative for chest pain and palpitations.   Gastrointestinal:  Negative for abdominal pain and vomiting.   Genitourinary:  Negative for dysuria and hematuria.   Musculoskeletal:  Negative for arthralgias and back pain.  "  Skin:  Negative for color change and rash.   Neurological:  Negative for seizures and syncope.   All other systems reviewed and are negative.        Objective:      /80 (BP Location: Left arm, Patient Position: Sitting, Cuff Size: Adult)   Pulse 68   Wt 77.1 kg (170 lb)   SpO2 100%   BMI 25.85 kg/m²     Lab Results   Component Value Date    PSA 0.52 05/13/2024    PSA 0.2 06/11/2021          Physical Exam  Vitals reviewed.   Constitutional:       Appearance: Normal appearance. He is normal weight.   HENT:      Head: Normocephalic and atraumatic.   Eyes:      Pupils: Pupils are equal, round, and reactive to light.   Abdominal:      General: Abdomen is flat.   Neurological:      General: No focal deficit present.      Mental Status: He is alert and oriented to person, place, and time.   Psychiatric:         Mood and Affect: Mood normal.         Thought Content: Thought content normal.            Cystoscopy     Date/Time  8/7/2024 9:30 AM     Performed by  Dean Clemente MD   Authorized by  Dean Clemente MD     Universal Protocol:  Consent: Written consent obtained.  Risks and benefits: risks, benefits and alternatives were discussed  Consent given by: patient  Time out: Immediately prior to procedure a \"time out\" was called to verify the correct patient, procedure, equipment, support staff and site/side marked as required.  Patient understanding: patient states understanding of the procedure being performed  Patient consent: the patient's understanding of the procedure matches consent given  Procedure consent: procedure consent matches procedure scheduled  Patient identity confirmed: verbally with patient      Procedure Details:  Procedure type: cystoscopy    Patient tolerance: Patient tolerated the procedure well with no immediate complications    Additional Procedure Details: A time-out was performed identifying the correct patient site and procedure.  A MA chaperone was in the room.  A flexible " cystoscope was introduced into the urethra.  The pendulous urethra was normal.  The prostatic urethra showed bilateral lobar hypertrophy without a median lobe.  The bladder had floating debris which initially made visualization difficult so we filled the bladder and then emptied and reexamined.  There appeared to be mild erythema throughout the bladder consistent with inflammation.  This did not look typical for CIS.  There were mild trabeculations and no diverticula.  The ureteral orifices were not easily seen    Photos were taken of the views throughout the bladder      Orders  Orders Placed This Encounter   Procedures    Cystoscopy     This order was created via procedure documentation    POCT Measure PVR

## 2024-08-07 NOTE — ASSESSMENT & PLAN NOTE
The patient has had a negative hematuria workup with unremarkable CT IVP and negative cytology and cystoscopy today which showed signs of inflammation but no discrete lesions.    I suspect that his prior hematuria was secondary to bladder inflammation from what may be chronic infections related to his diabetes which is now getting under better control      Recommend annual urinalysis with PCP and consideration for repeat workup in 3-5 years if micro hematuria persists.  If the patient has persistent gross hematuria then they should see us immediately.

## 2024-08-13 DIAGNOSIS — I10 BENIGN ESSENTIAL HYPERTENSION: ICD-10-CM

## 2024-08-13 RX ORDER — AMLODIPINE BESYLATE 5 MG/1
5 TABLET ORAL DAILY
Qty: 90 TABLET | Refills: 1 | Status: SHIPPED | OUTPATIENT
Start: 2024-08-13

## 2024-08-30 ENCOUNTER — TELEPHONE (OUTPATIENT)
Age: 69
End: 2024-08-30

## 2024-08-30 NOTE — TELEPHONE ENCOUNTER
Attempted to call patient to make sure patient is aware of new location for appointment on 09/3/2024. Phone unable to accept call at the moment. [address] [place] [address].

## 2024-08-30 NOTE — TELEPHONE ENCOUNTER
Called and LVM for patient regarding new office location and address. Address provided to patient.

## 2024-09-02 ENCOUNTER — HOSPITAL ENCOUNTER (EMERGENCY)
Facility: HOSPITAL | Age: 69
Discharge: HOME | End: 2024-09-02
Attending: EMERGENCY MEDICINE
Payer: COMMERCIAL

## 2024-09-02 VITALS
OXYGEN SATURATION: 100 % | SYSTOLIC BLOOD PRESSURE: 127 MMHG | RESPIRATION RATE: 16 BRPM | HEART RATE: 78 BPM | DIASTOLIC BLOOD PRESSURE: 67 MMHG | TEMPERATURE: 96.9 F

## 2024-09-02 DIAGNOSIS — R73.9 HYPERGLYCEMIA: Primary | ICD-10-CM

## 2024-09-02 LAB
ALBUMIN SERPL BCG-MCNC: 3.5 G/DL (ref 3.5–5)
ALP SERPL-CCNC: 174 U/L (ref 34–104)
ALT SERPL W P-5'-P-CCNC: 13 U/L (ref 7–52)
ANION GAP SERPL CALCULATED.3IONS-SCNC: 11 MMOL/L (ref 4–13)
AST SERPL W P-5'-P-CCNC: 21 U/L (ref 13–39)
ATRIAL RATE: 87 BPM
B-OH-BUTYR SERPL-MCNC: 0.36 MMOL/L (ref 0.02–0.27)
BASE EX.OXY STD BLDV CALC-SCNC: 59.4 % (ref 60–80)
BASE EXCESS BLDV CALC-SCNC: -2.7 MMOL/L
BASOPHILS # BLD AUTO: 0.03 THOUSANDS/ÂΜL (ref 0–0.1)
BASOPHILS NFR BLD AUTO: 1 % (ref 0–1)
BILIRUB SERPL-MCNC: 1.88 MG/DL (ref 0.2–1)
BUN SERPL-MCNC: 23 MG/DL (ref 5–25)
CALCIUM SERPL-MCNC: 8.8 MG/DL (ref 8.4–10.2)
CARDIAC TROPONIN I PNL SERPL HS: 4 NG/L
CHLORIDE SERPL-SCNC: 99 MMOL/L (ref 96–108)
CO2 SERPL-SCNC: 22 MMOL/L (ref 21–32)
CREAT SERPL-MCNC: 1.01 MG/DL (ref 0.6–1.3)
EOSINOPHIL # BLD AUTO: 0.01 THOUSAND/ÂΜL (ref 0–0.61)
EOSINOPHIL NFR BLD AUTO: 0 % (ref 0–6)
ERYTHROCYTE [DISTWIDTH] IN BLOOD BY AUTOMATED COUNT: 14.6 % (ref 11.6–15.1)
GFR SERPL CREATININE-BSD FRML MDRD: 76 ML/MIN/1.73SQ M
GLUCOSE SERPL-MCNC: 288 MG/DL (ref 65–140)
GLUCOSE SERPL-MCNC: 310 MG/DL (ref 65–140)
HCO3 BLDV-SCNC: 21.4 MMOL/L (ref 24–30)
HCT VFR BLD AUTO: 36.5 % (ref 36.5–49.3)
HGB BLD-MCNC: 12.2 G/DL (ref 12–17)
IMM GRANULOCYTES # BLD AUTO: 0.02 THOUSAND/UL (ref 0–0.2)
IMM GRANULOCYTES NFR BLD AUTO: 0 % (ref 0–2)
LIPASE SERPL-CCNC: 24 U/L (ref 11–82)
LYMPHOCYTES # BLD AUTO: 0.76 THOUSANDS/ÂΜL (ref 0.6–4.47)
LYMPHOCYTES NFR BLD AUTO: 14 % (ref 14–44)
MCH RBC QN AUTO: 32.6 PG (ref 26.8–34.3)
MCHC RBC AUTO-ENTMCNC: 33.4 G/DL (ref 31.4–37.4)
MCV RBC AUTO: 98 FL (ref 82–98)
MONOCYTES # BLD AUTO: 0.46 THOUSAND/ÂΜL (ref 0.17–1.22)
MONOCYTES NFR BLD AUTO: 8 % (ref 4–12)
NEUTROPHILS # BLD AUTO: 4.19 THOUSANDS/ÂΜL (ref 1.85–7.62)
NEUTS SEG NFR BLD AUTO: 77 % (ref 43–75)
NRBC BLD AUTO-RTO: 0 /100 WBCS
O2 CT BLDV-SCNC: 11 ML/DL
P AXIS: 83 DEGREES
PCO2 BLDV: 35.2 MM HG (ref 42–50)
PH BLDV: 7.4 [PH] (ref 7.3–7.4)
PLATELET # BLD AUTO: 88 THOUSANDS/UL (ref 149–390)
PMV BLD AUTO: 10.4 FL (ref 8.9–12.7)
PO2 BLDV: 33.1 MM HG (ref 35–45)
POTASSIUM SERPL-SCNC: 4.2 MMOL/L (ref 3.5–5.3)
PR INTERVAL: 128 MS
PROT SERPL-MCNC: 7.4 G/DL (ref 6.4–8.4)
QRS AXIS: 84 DEGREES
QRSD INTERVAL: 86 MS
QT INTERVAL: 366 MS
QTC INTERVAL: 440 MS
RBC # BLD AUTO: 3.74 MILLION/UL (ref 3.88–5.62)
SODIUM SERPL-SCNC: 132 MMOL/L (ref 135–147)
T WAVE AXIS: 53 DEGREES
VENTRICULAR RATE: 87 BPM
WBC # BLD AUTO: 5.47 THOUSAND/UL (ref 4.31–10.16)

## 2024-09-02 PROCEDURE — 83690 ASSAY OF LIPASE: CPT

## 2024-09-02 PROCEDURE — 96375 TX/PRO/DX INJ NEW DRUG ADDON: CPT

## 2024-09-02 PROCEDURE — 96366 THER/PROPH/DIAG IV INF ADDON: CPT

## 2024-09-02 PROCEDURE — 85025 COMPLETE CBC W/AUTO DIFF WBC: CPT

## 2024-09-02 PROCEDURE — 36415 COLL VENOUS BLD VENIPUNCTURE: CPT

## 2024-09-02 PROCEDURE — 84484 ASSAY OF TROPONIN QUANT: CPT

## 2024-09-02 PROCEDURE — 82948 REAGENT STRIP/BLOOD GLUCOSE: CPT

## 2024-09-02 PROCEDURE — 96365 THER/PROPH/DIAG IV INF INIT: CPT

## 2024-09-02 PROCEDURE — 82805 BLOOD GASES W/O2 SATURATION: CPT

## 2024-09-02 PROCEDURE — 99285 EMERGENCY DEPT VISIT HI MDM: CPT

## 2024-09-02 PROCEDURE — 80053 COMPREHEN METABOLIC PANEL: CPT

## 2024-09-02 PROCEDURE — 99285 EMERGENCY DEPT VISIT HI MDM: CPT | Performed by: EMERGENCY MEDICINE

## 2024-09-02 PROCEDURE — 93005 ELECTROCARDIOGRAM TRACING: CPT

## 2024-09-02 PROCEDURE — 82010 KETONE BODYS QUAN: CPT

## 2024-09-02 PROCEDURE — 93010 ELECTROCARDIOGRAM REPORT: CPT | Performed by: INTERNAL MEDICINE

## 2024-09-02 RX ORDER — ONDANSETRON 2 MG/ML
4 INJECTION INTRAMUSCULAR; INTRAVENOUS ONCE
Status: COMPLETED | OUTPATIENT
Start: 2024-09-02 | End: 2024-09-02

## 2024-09-02 RX ORDER — SODIUM CHLORIDE, SODIUM GLUCONATE, SODIUM ACETATE, POTASSIUM CHLORIDE, MAGNESIUM CHLORIDE, SODIUM PHOSPHATE, DIBASIC, AND POTASSIUM PHOSPHATE .53; .5; .37; .037; .03; .012; .00082 G/100ML; G/100ML; G/100ML; G/100ML; G/100ML; G/100ML; G/100ML
500 INJECTION, SOLUTION INTRAVENOUS ONCE
Status: COMPLETED | OUTPATIENT
Start: 2024-09-02 | End: 2024-09-02

## 2024-09-02 RX ADMIN — ONDANSETRON 4 MG: 2 INJECTION INTRAMUSCULAR; INTRAVENOUS at 10:55

## 2024-09-02 RX ADMIN — SODIUM CHLORIDE, SODIUM GLUCONATE, SODIUM ACETATE, POTASSIUM CHLORIDE, MAGNESIUM CHLORIDE, SODIUM PHOSPHATE, DIBASIC, AND POTASSIUM PHOSPHATE 500 ML: .53; .5; .37; .037; .03; .012; .00082 INJECTION, SOLUTION INTRAVENOUS at 10:58

## 2024-09-02 NOTE — DISCHARGE INSTRUCTIONS
You were evaluated in the emergency department for elevated blood sugar.  Your evaluation is showing no acute process requiring emergency treatment or admission to the hospital.  Continue to use your home insulin and avoid sugary drinks and foods, such as soda.    Which foods have carbs? -- Foods with a lot of carbs include:   Grains - These include bread, pasta, rice, and cereal.   Fruits and starchy vegetables - Starchy vegetables include potatoes, corn, and squash.   Milk and other dairy products - Dairy products include cheese and yogurt.   Foods with added sugar - These include sweets and baked goods likes cookies and cakes, as well as sugary drinks like juice and soda.  It is best to get most of your carbs from fruits, vegetables, whole grains (like whole-wheat bread, whole-grain cereals, and brown rice), and low-fat milk and dairy products.

## 2024-09-02 NOTE — ED ATTENDING ATTESTATION
9/2/2024  I, Richard Sidhu MD, saw and evaluated the patient. I have discussed the patient with the resident/non-physician practitioner and agree with the resident's/non-physician practitioner's findings, Plan of Care, and MDM as documented in the resident's/non-physician practitioner's note, except where noted. All available labs and Radiology studies were reviewed.  I was present for key portions of any procedure(s) performed by the resident/non-physician practitioner and I was immediately available to provide assistance.       At this point I agree with the current assessment done in the Emergency Department.  I have conducted an independent evaluation of this patient a history and physical is as follows:    ED Course     69-year-old male, history of diabetes on Januvia as well as long-acting insulin that he takes in the evening (40 units) presenting to the emergency department for evaluation of 2 days of hyperglycemia with blood glucoses in the 500s and general malaise.  No reported fever.  No chest pain, cough, shortness of breath.  Positive nausea.  Decreased appetite.  No diarrhea.  No dysuria or urinary frequency.    The patient is resting comfortably on a stretcher in no acute respiratory distress. The patient appears nontoxic. HEENT reveals moist mucous membranes. Head is normocephalic and atraumatic. Conjunctiva and sclera are normal. Neck is nontender and supple with full range of motion to flexion, extension, lateral rotation. No meningismus appreciated. No masses are appreciated. Lungs are clear to auscultation bilaterally without any wheezes, rales or rhonchi. Heart is regular rate and rhythm without any murmurs, rubs or gallops. Abdomen is soft and nontender without any rebound or guarding. Extremities appear grossly normal without any significant arthropathy. Patient is awake, alert, and oriented x3. The patient has normal interaction.  Cranial nerves grossly intact.  Motor is 5 out of 5  bilateral upper and lower extremities.    MEDICAL DECISION MAKING    Number and Complexity of Problems  Differential diagnosis: Hypoglycemia, dehydration, electrolyte disorder, diabetic ketoacidosis.    Medical Decision Making Data  External documents reviewed: Reviewed last outpatient office visit with primary care on 6/27/2024 where the patient was seen for poorly controlled diabetes.  My EKG interpretation: Normal sinus rhythm at 87 bpm.  No acute ST or T wave changes.    No orders to display       Labs Reviewed   CBC AND DIFFERENTIAL - Abnormal       Result Value Ref Range Status    WBC 5.47  4.31 - 10.16 Thousand/uL Final    RBC 3.74 (*) 3.88 - 5.62 Million/uL Final    Hemoglobin 12.2  12.0 - 17.0 g/dL Final    Hematocrit 36.5  36.5 - 49.3 % Final    MCV 98  82 - 98 fL Final    MCH 32.6  26.8 - 34.3 pg Final    MCHC 33.4  31.4 - 37.4 g/dL Final    RDW 14.6  11.6 - 15.1 % Final    MPV 10.4  8.9 - 12.7 fL Final    Platelets 88 (*) 149 - 390 Thousands/uL Final    Comment: plt rev 9/2/24    nRBC 0  /100 WBCs Final    Segmented % 77 (*) 43 - 75 % Final    Immature Grans % 0  0 - 2 % Final    Lymphocytes % 14  14 - 44 % Final    Monocytes % 8  4 - 12 % Final    Eosinophils Relative 0  0 - 6 % Final    Basophils Relative 1  0 - 1 % Final    Absolute Neutrophils 4.19  1.85 - 7.62 Thousands/µL Final    Absolute Immature Grans 0.02  0.00 - 0.20 Thousand/uL Final    Absolute Lymphocytes 0.76  0.60 - 4.47 Thousands/µL Final    Absolute Monocytes 0.46  0.17 - 1.22 Thousand/µL Final    Eosinophils Absolute 0.01  0.00 - 0.61 Thousand/µL Final    Basophils Absolute 0.03  0.00 - 0.10 Thousands/µL Final   COMPREHENSIVE METABOLIC PANEL - Abnormal    Sodium 132 (*) 135 - 147 mmol/L Final    Potassium 4.2  3.5 - 5.3 mmol/L Final    Chloride 99  96 - 108 mmol/L Final    CO2 22  21 - 32 mmol/L Final    ANION GAP 11  4 - 13 mmol/L Final    BUN 23  5 - 25 mg/dL Final    Creatinine 1.01  0.60 - 1.30 mg/dL Final    Comment: Standardized  to IDMS reference method    Glucose 310 (*) 65 - 140 mg/dL Final    Comment: If the patient is fasting, the ADA then defines impaired fasting glucose as > 100 mg/dL and diabetes as > or equal to 123 mg/dL.    Calcium 8.8  8.4 - 10.2 mg/dL Final    AST 21  13 - 39 U/L Final    ALT 13  7 - 52 U/L Final    Comment: Specimen collection should occur prior to Sulfasalazine administration due to the potential for falsely depressed results.     Alkaline Phosphatase 174 (*) 34 - 104 U/L Final    Total Protein 7.4  6.4 - 8.4 g/dL Final    Albumin 3.5  3.5 - 5.0 g/dL Final    Total Bilirubin 1.88 (*) 0.20 - 1.00 mg/dL Final    Comment: Use of this assay is not recommended for patients undergoing treatment with eltrombopag due to the potential for falsely elevated results.  N-acetyl-p-benzoquinone imine (metabolite of Acetaminophen) will generate erroneously low results in samples for patients that have taken an overdose of Acetaminophen.    eGFR 76  ml/min/1.73sq m Final    Narrative:     National Kid[employer] guidelines for Chronic Kidney Disease (CKD):     Stage 1 with normal or high GFR (GFR > 90 mL/min/1.73 square meters)    Stage 2 Mild CKD (GFR = 60-89 mL/min/1.73 square meters)    Stage 3A Moderate CKD (GFR = 45-59 mL/min/1.73 square meters)    Stage 3B Moderate CKD (GFR = 30-44 mL/min/1.73 square meters)    Stage 4 Severe CKD (GFR = 15-29 mL/min/1.73 square meters)    Stage 5 End Stage CKD (GFR <15 mL/min/1.73 square meters)  Note: GFR calculation is accurate only with a steady state creatinine   BETA HYDROXYBUTYRATE - Abnormal    Beta- Hydroxybutyrate 0.36 (*) 0.02 - 0.27 mmol/L Final   BLOOD GAS, VENOUS - Abnormal    pH, Boo 7.402 (*) 7.300 - 7.400 Final    pCO2, Boo 35.2 (*) 42.0 - 50.0 mm Hg Final    pO2, Boo 33.1 (*) 35.0 - 45.0 mm Hg Final    HCO3, Boo 21.4 (*) 24 - 30 mmol/L Final    Base Excess, Boo -2.7  mmol/L Final    O2 Content, Boo 11.0  ml/dL Final    O2 HGB, VENOUS 59.4 (*) 60.0 - 80.0 %  "Final   POCT GLUCOSE - Abnormal    POC Glucose 288 (*) 65 - 140 mg/dl Final    Comment: CRITICAL VALUE NOTED-NURSE NOTIFIED-   HS TROPONIN I 0HR - Normal    hs TnI 0hr 4  \"Refer to ACS Flowchart\"- see link ng/L Final    Comment:                                              Initial (time 0) result  If >=50 ng/L, Myocardial injury suggested ;  Type of myocardial injury and treatment strategy  to be determined.  If 5-49 ng/L, a delta result at 2 hours and or 4 hours will be needed to further evaluate.  If <4 ng/L, and chest pain has been >3 hours since onset, patient may qualify for discharge based on the HEART score in the ED.  If <5 ng/L and <3hours since onset of chest pain, a delta result at 2 hours will be needed to further evaluate.    HS Troponin 99th Percentile URL of a Health Population=12 ng/L with a 95% Confidence Interval of 8-18 ng/L.    Second Troponin (time 2 hours)  If calculated delta >= 20 ng/L,  Myocardial injury suggested ; Type of myocardial injury and treatment strategy to be determined.  If 5-49 ng/L and the calculated delta is 5-19 ng/L, consult medical service for evaluation.  Continue evaluation for ischemia on ecg and other possible etiology and repeat hs troponin at 4 hours.  If delta is <5 ng/L at 2 hours, consider discharge based on risk stratification via the HEART score (if in ED), or ALYCE risk score in IP/Observation.    HS Troponin 99th Percentile URL of a Health Population=12 ng/L with a 95% Confidence Interval of 8-18 ng/L.   LIPASE - Normal    Lipase 24  11 - 82 u/L Final       Labs reviewed by me are significant for:  Mild hyperglycemia.    Treatment and Disposition  ED course: Patient underwent evaluation in the emergency department with lab work.  He was treated with IV fluids to replenish insensible losses from hyperglycemia.  No evidence of DKA per labs.  Shared decision making: Recommend outpatient office visit.  Patient was instructed to hold on taking simple carbohydrates " like soda and Pasta.  Code status: Full code.              Critical Care Time  Procedures

## 2024-09-02 NOTE — ED PROVIDER NOTES
History  Chief Complaint   Patient presents with    Hyperglycemia - Symptomatic     Pt c.o of having high blood sugar for a couple days now, this morning blood glucose was greater than 500, he says he been more fatigued     Patient is a 69-year-old male with a past medical history of diabetes, on insulin at home, presenting with feelings of generalized fatigue, nausea, and multiple readings of blood glucose over 500 last night and then this morning.  Patient has felt fatigued since 3 days ago, prompted him to not check his blood sugars until last night, at which point he saw that it was over 500.  He additionally checked it this morning and after the second over 500 reading came into the emergency department for evaluation, further review of systems positive for mild nausea, otherwise no further symptoms on review of systems.        Prior to Admission Medications   Prescriptions Last Dose Informant Patient Reported? Taking?   Empagliflozin (Jardiance) 10 MG TABS tablet  Self No No   Sig: TAKE 1 TABLET BY MOUTH EVERY DAY   Insulin Pen Needle (B-D UF III MINI PEN NEEDLES) 31G X 5 MM MISC  Self No No   Sig: Use daily   Lancets (OneTouch Delica Plus [id number]) MISC  Self No No   Sig: TEST DAILY   amLODIPine (NORVASC) 5 mg tablet   No No   Sig: Take 1 tablet (5 mg total) by mouth daily   atorvastatin (LIPITOR) 20 mg tablet  Self No No   Sig: TAKE 1 TABLET BY MOUTH EVERY DAY   cholecalciferol (VITAMIN D3) 1,000 units tablet  Self No No   Sig: Take 1 tablet (1,000 Units total) by mouth daily   glucose blood (OneTouch Ultra) test strip  Self No No   Sig: Use 1 each 3 (three) times a day Use as instructed   insulin glargine (Toujeo SoloStar) 300 units/mL CONCENTRATED U-300 injection pen (1-unit dial)  Self No No   Sig: Inject 40 Units under the skin daily   metFORMIN (GLUCOPHAGE-XR) 500 mg 24 hr tablet  Self No No   Sig: Take 2 tablets (1,000 mg total) by mouth daily with dinner For diabetes   metoprolol succinate  (TOPROL-XL) 100 mg 24 hr tablet  Self No No   Sig: Take 1 tablet (100 mg total) by mouth daily      Facility-Administered Medications: None       Past Medical History:   Diagnosis Date    Abnormal liver function test     LAST ASSESSED: 13JUN2012    Anxiety     Athlete's foot     LAST ASSESSED: 08MAY2013    Callus     LAST ASSESSED: 08MAY2013    Carpal tunnel syndrome, unspecified upper limb     LAST ASSESSED: 02OCT2012    Chronic pain syndrome     LAST ASSESSED: 22OCT2013    Colon polyp     Diabetes mellitus (HCC)     Diabetes mellitus due to underlying condition, uncontrolled, with hyperglycemia (HCC) 7/25/2016    Transitioned From: Diabetes mellitus type 2, uncontrolled    Esophageal varices without bleeding, unspecified esophageal varices type (HCC) 3/27/2023    Herpes zoster ophthalmicus of right eye     LAST ASSESSED: 10NOV2016    Hyperglycemia     LAST ASSESSED: 08MAY2013    Hyperplastic colon polyp     Obesity 5/22/2017    Pericardial effusion     LAST ASSESSED: 13JUN2012    Positive colorectal cancer screening using Cologuard test 6/8/2021    Right lower quadrant abdominal pain 6/8/2021       Past Surgical History:   Procedure Laterality Date    COLONOSCOPY      HEMORROIDECTOMY      LUMBAR LAMINECTOMY      LAST ASSESSED: 07APR2015    TONSILLECTOMY AND ADENOIDECTOMY      WISDOM TOOTH EXTRACTION         Family History   Problem Relation Age of Onset    No Known Problems Father     Hypotension Family     Prostate cancer Family     Coronary artery disease Family     Diabetes Family     Diverticulitis Mother     Breast cancer Mother      I have reviewed and agree with the history as documented.    E-Cigarette/Vaping    E-Cigarette Use Never User      E-Cigarette/Vaping Substances    Nicotine No     THC No     CBD No     Flavoring No     Other No     Unknown No      Social History     Tobacco Use    Smoking status: Every Day     Current packs/day: 0.50     Types: Cigarettes    Smokeless tobacco: Never   Vaping Use     Vaping status: Never Used   Substance Use Topics    Alcohol use: Yes     Comment: ocasional    Drug use: No        Review of Systems   All other systems reviewed and are negative.      Physical Exam  ED Triage Vitals [09/02/24 1005]   Temperature Pulse Respirations Blood Pressure SpO2   (!) 96.9 °F (36.1 °C) 97 18 147/69 98 %      Temp Source Heart Rate Source Patient Position - Orthostatic VS BP Location FiO2 (%)   Temporal Monitor Sitting Left arm --      Pain Score       No Pain             Orthostatic Vital Signs  Vitals:    09/02/24 1005 09/02/24 1245   BP: 147/69 127/67   Pulse: 97 78   Patient Position - Orthostatic VS: Sitting        Physical Exam  Vitals and nursing note reviewed.   Constitutional:       General: He is not in acute distress.     Appearance: He is well-developed.   HENT:      Head: Normocephalic and atraumatic.   Eyes:      Conjunctiva/sclera: Conjunctivae normal.   Cardiovascular:      Rate and Rhythm: Normal rate and regular rhythm.      Heart sounds: No murmur heard.  Pulmonary:      Effort: Pulmonary effort is normal. No respiratory distress.      Breath sounds: Normal breath sounds.   Abdominal:      Palpations: Abdomen is soft.      Tenderness: There is no abdominal tenderness.   Musculoskeletal:         General: No swelling.      Cervical back: Neck supple.   Skin:     General: Skin is warm and dry.      Capillary Refill: Capillary refill takes less than 2 seconds.   Neurological:      Mental Status: He is alert.   Psychiatric:         Mood and Affect: Mood normal.         ED Medications  Medications   multi-electrolyte (ISOLYTE-S PH 7.4) bolus 500 mL (0 mL Intravenous Stopped 9/2/24 1302)   ondansetron (ZOFRAN) injection 4 mg (4 mg Intravenous Given 9/2/24 1055)       Diagnostic Studies  Results Reviewed       Procedure Component Value Units Date/Time    HS Troponin I 4hr [[id number]]     Lab Status: No result Specimen: Blood     Comprehensive metabolic panel [[id number]]   (Abnormal) Collected: 09/02/24 1053    Lab Status: Final result Specimen: Blood from Arm, Left Updated: 09/02/24 1203     Sodium 132 mmol/L      Potassium 4.2 mmol/L      Chloride 99 mmol/L      CO2 22 mmol/L      ANION GAP 11 mmol/L      BUN 23 mg/dL      Creatinine 1.01 mg/dL      Glucose 310 mg/dL      Calcium 8.8 mg/dL      AST 21 U/L      ALT 13 U/L      Alkaline Phosphatase 174 U/L      Total Protein 7.4 g/dL      Albumin 3.5 g/dL      Total Bilirubin 1.88 mg/dL      eGFR 76 ml/min/1.73sq m     Narrative:      National Kidney Disease Foundation guidelines for Chronic Kidney Disease (CKD):     Stage 1 with normal or high GFR (GFR > 90 mL/min/1.73 square meters)    Stage 2 Mild CKD (GFR = 60-89 mL/min/1.73 square meters)    Stage 3A Moderate CKD (GFR = 45-59 mL/min/1.73 square meters)    Stage 3B Moderate CKD (GFR = 30-44 mL/min/1.73 square meters)    Stage 4 Severe CKD (GFR = 15-29 mL/min/1.73 square meters)    Stage 5 End Stage CKD (GFR <15 mL/min/1.73 square meters)  Note: GFR calculation is accurate only with a steady state creatinine    Lipase [[id number]]  (Normal) Collected: 09/02/24 1053    Lab Status: Final result Specimen: Blood from Arm, Left Updated: 09/02/24 1203     Lipase 24 u/L     Beta Hydroxybutyrate [[id number]]  (Abnormal) Collected: 09/02/24 1053    Lab Status: Final result Specimen: Blood from Arm, Left Updated: 09/02/24 1203     Beta- Hydroxybutyrate 0.36 mmol/L     CBC and differential [[id number]]  (Abnormal) Collected: 09/02/24 1053    Lab Status: Final result Specimen: Blood from Arm, Left Updated: 09/02/24 1133     WBC 5.47 Thousand/uL      RBC 3.74 Million/uL      Hemoglobin 12.2 g/dL      Hematocrit 36.5 %      MCV 98 fL      MCH 32.6 pg      MCHC 33.4 g/dL      RDW 14.6 %      MPV 10.4 fL      Platelets 88 Thousands/uL      nRBC 0 /100 WBCs      Segmented % 77 %      Immature Grans % 0 %      Lymphocytes % 14 %      Monocytes % 8 %      Eosinophils Relative 0 %      Basophils Relative  1 %      Absolute Neutrophils 4.19 Thousands/µL      Absolute Immature Grans 0.02 Thousand/uL      Absolute Lymphocytes 0.76 Thousands/µL      Absolute Monocytes 0.46 Thousand/µL      Eosinophils Absolute 0.01 Thousand/µL      Basophils Absolute 0.03 Thousands/µL     HS Troponin 0hr (reflex protocol) [[id number]]  (Normal) Collected: 09/02/24 1053    Lab Status: Final result Specimen: Blood from Arm, Left Updated: 09/02/24 1124     hs TnI 0hr 4 ng/L     HS Troponin I 2hr [[id number]]     Lab Status: No result Specimen: Blood     Blood gas, venous [[id number]]  (Abnormal) Collected: 09/02/24 1053    Lab Status: Final result Specimen: Blood from Arm, Left Updated: 09/02/24 1104     pH, Boo 7.402     pCO2, Boo 35.2 mm Hg      pO2, Boo 33.1 mm Hg      HCO3, Boo 21.4 mmol/L      Base Excess, Boo -2.7 mmol/L      O2 Content, Boo 11.0 ml/dL      O2 HGB, VENOUS 59.4 %     Fingerstick Glucose (POCT) [[id number]]  (Abnormal) Collected: 09/02/24 1005    Lab Status: Final result Specimen: Blood Updated: 09/02/24 1007     POC Glucose 288 mg/dl                    No orders to display         Procedures  Procedures      ED Course  ED Course as of 09/02/24 1310   Mon Sep 02, 2024   1048 Procedure Note: EKG  Date/Time: 09/02/24 10:48 AM   Interpreted by: Luan Patel  Indications / Diagnosis: fatigue, nausea  ECG reviewed by me, the ED Provider: yes   The EKG demonstrates: normal EKG, normal sinus rhythm  Rhythm: normal sinus  Intervals: normal intervals  Axis: normal axis  QRS/Blocks: normal QRS  ST Changes: No acute ST Changes, no STD/MARCEL.                               SBIRT 20yo+      Flowsheet Row Most Recent Value   Initial Alcohol Screen: US AUDIT-C     1. How often do you have a drink containing alcohol? 0 Filed at: 09/02/2024 1007   Audit-C Score 0 Filed at: 09/02/2024 1007   FRAN: How many times in the past year have you...    Used an illegal drug or used a prescription medication for non-medical reasons? Never Filed at:  09/02/2024 1007                  Medical Decision Making  Patient is 69M with PMH of diabetes who presents to the ED with elevated blood sugar and fatigue.    Vital signs stable. On exam no acute abnormalities.    History and physical exam most consistent with hyperglycemia, symptomatic. However, differential diagnosis included but not limited to ACS, DKA, electrolyte abnormality. Plan laboratory evaluation, Zofran for nausea, fluids    View ED course above for further discussion on patient workup.    All labs reviewed and utilized in the medical decision making process  All radiology studies independently viewed by me and interpreted by the radiologist.  I reviewed all testing with the patient.     Upon re-evaluation patient feeling improved after fluid bolus. Labs showing no DKA, ACS, BLANCA, or other significant abnormality. Will recommend dietary changes and PCP f/u.    Return precautions discussed, all questions answered prior to discharge.      Amount and/or Complexity of Data Reviewed  Labs: ordered.    Risk  Prescription drug management.          Disposition  Final diagnoses:   Hyperglycemia     Time reflects when diagnosis was documented in both MDM as applicable and the Disposition within this note       Time User Action Codes Description Comment    9/2/2024 12:52 PM Luan Patel Add [R73.9] Hyperglycemia           ED Disposition       ED Disposition   Discharge    Condition   Stable    Date/Time   Mon Sep 2, 2024 1229    Comment   Bg Gruber discharge to home/self care.                   Follow-up Information       Follow up With Specialties Details Why Contact Info    Lea Reyes, MD Internal Medicine   [address] [place] [id number]  [phone]              Discharge Medication List as of 9/2/2024 12:53 PM        CONTINUE these medications which have NOT CHANGED    Details   amLODIPine (NORVASC) 5 mg tablet Take 1 tablet (5 mg total) by mouth daily, Starting Tue 8/13/2024, Normal       atorvastatin (LIPITOR) 20 mg tablet TAKE 1 TABLET BY MOUTH EVERY DAY, Normal      cholecalciferol (VITAMIN D3) 1,000 units tablet Take 1 tablet (1,000 Units total) by mouth daily, Starting Mon 3/27/2023, No Print      Empagliflozin (Jardiance) 10 MG TABS tablet TAKE 1 TABLET BY MOUTH EVERY DAY, Starting Mon 4/15/2024, Normal      glucose blood (OneTouch Ultra) test strip Use 1 each 3 (three) times a day Use as instructed, Starting Fri 2/23/2024, Normal      insulin glargine (Toujeo SoloStar) 300 units/mL CONCENTRATED U-300 injection pen (1-unit dial) Inject 40 Units under the skin daily, Starting Thu 6/27/2024, Normal      Insulin Pen Needle (B-D UF III MINI PEN NEEDLES) 31G X 5 MM MISC Use daily, Starting Mon 5/20/2024, Normal      Lancets (OneTouch Delica Plus [id number]) MISC TEST DAILY, Normal      metFORMIN (GLUCOPHAGE-XR) 500 mg 24 hr tablet Take 2 tablets (1,000 mg total) by mouth daily with dinner For diabetes, Starting Thu 6/27/2024, No Print      metoprolol succinate (TOPROL-XL) 100 mg 24 hr tablet Take 1 tablet (100 mg total) by mouth daily, Starting Sat 6/15/2024, Normal           No discharge procedures on file.    PDMP Review       None             ED Provider  Attending physically available and evaluated Bg PHUC Yasmani. I managed the patient along with the ED Attending.    Electronically Signed by           Luan Patel MD  09/02/24 9924

## 2024-09-03 ENCOUNTER — OFFICE VISIT (OUTPATIENT)
Age: 69
End: 2024-09-03
Payer: COMMERCIAL

## 2024-09-03 VITALS
WEIGHT: 172.4 LBS | HEIGHT: 68 IN | TEMPERATURE: 96.9 F | DIASTOLIC BLOOD PRESSURE: 68 MMHG | SYSTOLIC BLOOD PRESSURE: 102 MMHG | BODY MASS INDEX: 26.13 KG/M2

## 2024-09-03 DIAGNOSIS — K31.89 PORTAL HYPERTENSIVE GASTROPATHY  (HCC): ICD-10-CM

## 2024-09-03 DIAGNOSIS — K76.6 PORTAL HYPERTENSIVE GASTROPATHY  (HCC): ICD-10-CM

## 2024-09-03 DIAGNOSIS — K74.60 CIRRHOSIS (HCC): Primary | ICD-10-CM

## 2024-09-03 DIAGNOSIS — Z86.010 HISTORY OF COLON POLYPS: ICD-10-CM

## 2024-09-03 DIAGNOSIS — I86.8 INTRA-ABDOMINAL VARICES: ICD-10-CM

## 2024-09-03 DIAGNOSIS — K75.81 NASH (NONALCOHOLIC STEATOHEPATITIS): ICD-10-CM

## 2024-09-03 DIAGNOSIS — Z11.59 ENCOUNTER FOR SCREENING FOR OTHER VIRAL DISEASES: ICD-10-CM

## 2024-09-03 PROCEDURE — 99204 OFFICE O/P NEW MOD 45 MIN: CPT | Performed by: STUDENT IN AN ORGANIZED HEALTH CARE EDUCATION/TRAINING PROGRAM

## 2024-09-03 RX ORDER — POLYETHYLENE GLYCOL 3350 17 G/17G
238 POWDER, FOR SOLUTION ORAL ONCE
Qty: 238 G | Refills: 0 | Status: SHIPPED | OUTPATIENT
Start: 2024-09-03 | End: 2024-09-03

## 2024-09-03 NOTE — PATIENT INSTRUCTIONS
Scheduled date of EGD/colonoscopy (as of today): 2/5/2025  Physician performing EGD/colonoscopy: Dr. Godfrey Noland  Location of EGD/colonoscopy: [facility]  Desired bowel prep reviewed with patient: Miralax/Dulcolax  Instructions reviewed with patient by: Isha- proc. prep instructions given to patient at 9/3/24 office visit  Clearances:  N/A    MRI scheduled on 9/22/2024 at [facility].  7M F/U scheduled on 3/3/2025 with Dr. PEARL Uriarte at [address] [place] [address].

## 2024-09-03 NOTE — PROGRESS NOTES
[facility] - Outpatient Consultation  Bg Gruber 69 y.o. male MRN: [id number]  Encounter: [id number]        ASSESSMENT AND PLAN     1. HATCH Cirrhosis  Compensated.  Etiology: Hatch.  Ascites: No clinically significant signs of ascites, lower extremity edema.  Variceal screening: EGD in 2021 was unremarkable, did show portal hypertensive gastropathy.  Will plan repeat EGD now.  HCC screening: Plan for MRI with AFP now, is overdue  Encephalopathy: None    Predicted Postoperative Outcomes by the VOCAL-Garrison Score:      30-day mortality: 1.5%      90-day mortality: 2.8%      180-day mortality: 4.2%      90-day decompensation: 13.2%    If EGD or MRI shows evidence of portal hypertension we will plan to switch his metoprolol to nonselective beta-blockers likely carvedilol.  However we will wait for the results of these test prior to doing that.    2. History of colon polyps  History of 3 tubular adenomas in 2021, is due now.  Order placed.  - Colonoscopy; Future  - polyethylene glycol (MiraLax) 17 GM/SCOOP powder; Take 238 g by mouth once for 1 dose Take 238 g my mouth. Use as directed  Dispense: 238 g; Refill: 0  MELD 3.0: 7 at 6/13/2024  7:14 AM  MELD-Na: 8 at 6/13/2024  7:14 AM  Calculated from:  Serum Creatinine: 0.71 mg/dL (Using min of 1 mg/dL) at 6/13/2024  7:14 AM  Serum Sodium: 140 mmol/L (Using max of 137 mmol/L) at 6/13/2024  7:14 AM  Total Bilirubin: 0.92 mg/dL (Using min of 1 mg/dL) at 6/13/2024  7:14 AM  Serum Albumin: 3.6 g/dL (Using max of 3.5 g/dL) at 6/13/2024  7:14 AM  INR(ratio): 1.16 at 6/13/2024  7:14 AM  Age at listing (hypothetical): 68 years  Sex: Male at 6/13/2024  7:14 AM    FUP after the testing.     Orders Placed This Encounter   Procedures   • MRI abdomen w wo contrast and mrcp   • Hepatitis A antibody, total   • Hepatitis B surface antibody   • AFP tumor marker   • EGD   • Colonoscopy       HISTORY OF PRESENT ILLNESS     HPI    Referred by surgery for  liver cirrhosis seen on imaging.    70 yo M with hx including but not limited to DM, HLD, HTN who presents on referral for imaging showing cirrhosis. He was seen by surgery team for hernia repair. He was ordered for elastography due to concern for thrombocytopenia, cirrhosis seen on CT back in 2021. Elastography showed F4 cirrhosis.    On encounter today he reports some weight loss of nearly 20-25 lbs over last year but over last few months the weight has been stable. He denies any blood in stool, diarrhea, constipation, confusion, jaundice, abdominal distension, sleeping issues.     He had CT abdo in 6/2021 which showed liver cirrhosis, GE junction varices, small ascites, splenomegaly amongst other findings. He had work up since then like MILTON, AMA, ASMA, Hep panel, AFP was negative. He had EGD and colonoscopy in 2021 which showed PHG, no varices, multiple tubular adenoma. He did not follow with GI since then up until now.      He denies any active or remote alcohol use. Uses tobacco.     REVIEW OF SYSTEMS     CONSTITUTIONAL: Denies any fever, chills, rigors, and weight loss.  HEENT: No earache or tinnitus. Denies hearing loss or visual disturbances.  CARDIOVASCULAR: No chest pain or palpitations.   RESPIRATORY: Denies any cough, hemoptysis, shortness of breath or dyspnea on exertion.  GASTROINTESTINAL: As noted in the History of Present Illness.   GENITOURINARY: No problems with urination. Denies any hematuria or dysuria.  NEUROLOGIC: No dizziness or vertigo, denies headaches.   MUSCULOSKELETAL: Denies any muscle or joint pain.   SKIN: Denies skin rashes or itching.   ENDOCRINE: Denies excessive thirst. Denies intolerance to heat or cold.  PSYCHOSOCIAL: Denies depression or anxiety. Denies any recent memory loss.       Historical information     Past Medical History:   Diagnosis Date   • Abnormal liver function test     LAST ASSESSED: 13JUN2012   • Anxiety    • Athlete's foot     LAST ASSESSED: 08MAY2013   • Callus      LAST ASSESSED: 08MAY2013   • Carpal tunnel syndrome, unspecified upper limb     LAST ASSESSED: 02OCT2012   • Chronic pain syndrome     LAST ASSESSED: 22OCT2013   • Colon polyp    • Diabetes mellitus (HCC)    • Diabetes mellitus due to underlying condition, uncontrolled, with hyperglycemia ([facility]) 7/25/2016    Transitioned From: Diabetes mellitus type 2, uncontrolled   • Esophageal varices without bleeding, unspecified esophageal varices type ([facility]) 3/27/2023   • Herpes zoster ophthalmicus of right eye     LAST ASSESSED: 10NOV2016   • Hyperglycemia     LAST ASSESSED: 08MAY2013   • Hyperplastic colon polyp    • Obesity 5/22/2017   • Pericardial effusion     LAST ASSESSED: 13JUN2012   • Positive colorectal cancer screening using Cologuard test 6/8/2021   • Right lower quadrant abdominal pain 6/8/2021     Past Surgical History:   Procedure Laterality Date   • COLONOSCOPY     • HEMORROIDECTOMY     • LUMBAR LAMINECTOMY      LAST ASSESSED: 07APR2015   • TONSILLECTOMY AND ADENOIDECTOMY     • WISDOM TOOTH EXTRACTION       Social History   Social History     Substance and Sexual Activity   Alcohol Use Yes    Comment: ocasional     Social History     Substance and Sexual Activity   Drug Use No     Social History     Tobacco Use   Smoking Status Every Day   • Current packs/day: 0.50   • Types: Cigarettes   Smokeless Tobacco Never     Family History   Problem Relation Age of Onset   • No Known Problems Father    • Hypotension Family    • Prostate cancer Family    • Coronary artery disease Family    • Diabetes Family    • Diverticulitis Mother    • Breast cancer Mother        Allergies       Current Outpatient Medications:   •  amLODIPine (NORVASC) 5 mg tablet  •  atorvastatin (LIPITOR) 20 mg tablet  •  cholecalciferol (VITAMIN D3) 1,000 units tablet  •  Empagliflozin (Jardiance) 10 MG TABS tablet  •  glucose blood (OneTouch Ultra) test strip  •  insulin glargine (Toujeo SoloStar) 300 units/mL CONCENTRATED U-300 injection pen  "(1-unit dial)  •  Insulin Pen Needle (B-D UF III MINI PEN NEEDLES) 31G X 5 MM MISC  •  Lancets (OneTouch Delica Plus [id number]) MISC  •  metFORMIN (GLUCOPHAGE-XR) 500 mg 24 hr tablet  •  metoprolol succinate (TOPROL-XL) 100 mg 24 hr tablet  •  polyethylene glycol (MiraLax) 17 GM/SCOOP powder    Allergies   Allergen Reactions   • Duloxetine Hcl Other (See Comments)   • Lisinopril Cough   • Olmesartan Medoxomil-Hctz      Annotation - 13Jun2012: Abnormal blood levels due to med           Objective assessment       Blood pressure 102/68, temperature (!) 96.9 °F (36.1 °C), temperature source Tympanic, height 5' 8\" (1.727 m), weight 78.2 kg (172 lb 6.4 oz). Body mass index is 26.21 kg/m².        PHYSICAL EXAM:         General Appearance:   Alert, cooperative, no distress   HEENT:   Normocephalic     Neck:  symmetrical   Lungs:   Breathing unlabored, able to speak in full sentences    Heart::   Regular rate    Abdomen:   Non tender   Genitalia:   Deferred    Rectal:   Deferred    Extremities:  No cyanosis, or edema    Pulses:  Strong   Skin:  Palmar erythema, spider angiomatas noted     Lab Results:      No visits with results within 1 Day(s) from this visit.   Latest known visit with results is:   Admission on 09/02/2024, Discharged on 09/02/2024   Component Date Value   • POC Glucose 09/02/2024 288 (H)    • WBC 09/02/2024 5.47    • RBC 09/02/2024 3.74 (L)    • Hemoglobin 09/02/2024 12.2    • Hematocrit 09/02/2024 36.5    • MCV 09/02/2024 98    • MCH 09/02/2024 32.6    • MCHC 09/02/2024 33.4    • RDW 09/02/2024 14.6    • MPV 09/02/2024 10.4    • Platelets 09/02/2024 88 (L)    • nRBC 09/02/2024 0    • Segmented % 09/02/2024 77 (H)    • Immature Grans % 09/02/2024 0    • Lymphocytes % 09/02/2024 14    • Monocytes % 09/02/2024 8    • Eosinophils Relative 09/02/2024 0    • Basophils Relative 09/02/2024 1    • Absolute Neutrophils 09/02/2024 4.19    • Absolute Immature Grans 09/02/2024 0.02    • Absolute Lymphocytes 09/02/2024 " 0.76    • Absolute Monocytes 09/02/2024 0.46    • Eosinophils Absolute 09/02/2024 0.01    • Basophils Absolute 09/02/2024 0.03    • Sodium 09/02/2024 132 (L)    • Potassium 09/02/2024 4.2    • Chloride 09/02/2024 99    • CO2 09/02/2024 22    • ANION GAP 09/02/2024 11    • BUN 09/02/2024 23    • Creatinine 09/02/2024 1.01    • Glucose 09/02/2024 310 (H)    • Calcium 09/02/2024 8.8    • AST 09/02/2024 21    • ALT 09/02/2024 13    • Alkaline Phosphatase 09/02/2024 174 (H)    • Total Protein 09/02/2024 7.4    • Albumin 09/02/2024 3.5    • Total Bilirubin 09/02/2024 1.88 (H)    • eGFR 09/02/2024 76    • hs TnI 0hr 09/02/2024 4    • Lipase 09/02/2024 24    • Beta- Hydroxybutyrate 09/02/2024 0.36 (H)    • pH, Boo 09/02/2024 7.402 (H)    • pCO2, Boo 09/02/2024 35.2 (L)    • pO2, Boo 09/02/2024 33.1 (L)    • HCO3, Boo 09/02/2024 21.4 (L)    • Base Excess, Boo 09/02/2024 -2.7    • O2 Content, Boo 09/02/2024 11.0    • O2 HGB, VENOUS 09/02/2024 59.4 (L)    • Ventricular Rate 09/02/2024 87    • Atrial Rate 09/02/2024 87    • AK Interval 09/02/2024 128    • QRSD Interval 09/02/2024 86    • QT Interval 09/02/2024 366    • QTC Interval 09/02/2024 440    • P Axis 09/02/2024 83    • QRS Axis 09/02/2024 84    • T Wave Axis 09/02/2024 53          Radiology Results:      No results found.      Corbin Noland MD  [facility]

## 2024-09-04 ENCOUNTER — VBI (OUTPATIENT)
Dept: INTERNAL MEDICINE CLINIC | Facility: CLINIC | Age: 69
End: 2024-09-04

## 2024-09-04 NOTE — TELEPHONE ENCOUNTER
09/04/24 9:59 AM    Patient contacted post ED visit, outreach attempt made but message could not be left. Additional outreach attempt will be made.     Thank you.  [facility] Iniguez MA  [facility]

## 2024-09-04 NOTE — TELEPHONE ENCOUNTER
09/04/24 3:31 PM    Patient contacted post ED visit, second outreach attempt made. Message was left for patient to return a call to the [facility] Department at [facility]: Phone [phone].    Thank you.  Vinny Iniguez MA  [facility]

## 2024-09-05 NOTE — TELEPHONE ENCOUNTER
09/05/24 10:36 AM    Patient contacted post ED visit, phone outreaches were unsuccessful; patient does not have MyChart, a MyChart letter has not been sent.     Thank you.  [facility] Iniguez MA  [facility]

## 2024-10-16 ENCOUNTER — TELEPHONE (OUTPATIENT)
Age: 69
End: 2024-10-16

## 2024-10-16 NOTE — TELEPHONE ENCOUNTER
Spoke with patient.  Cancelling due to recommendation from GI doc.  Asked him to call us if he decides to proceed with hernia repair in the future.

## 2024-10-16 NOTE — TELEPHONE ENCOUNTER
Patient called to cancel pre-op appointment (10/22) and surgery (11/6). Patient saw gastro and they advised him against surgery due to cirrhosis.

## 2024-10-22 ENCOUNTER — RA CDI HCC (OUTPATIENT)
Dept: OTHER | Facility: HOSPITAL | Age: 69
End: 2024-10-22

## 2024-10-25 ENCOUNTER — APPOINTMENT (OUTPATIENT)
Dept: LAB | Facility: CLINIC | Age: 69
End: 2024-10-25
Payer: COMMERCIAL

## 2024-10-25 DIAGNOSIS — I10 BENIGN ESSENTIAL HYPERTENSION: ICD-10-CM

## 2024-10-25 DIAGNOSIS — Z79.4 INSULIN LONG-TERM USE (HCC): ICD-10-CM

## 2024-10-25 DIAGNOSIS — D69.6 PLATELETS DECREASED (HCC): ICD-10-CM

## 2024-10-25 DIAGNOSIS — K74.60 CIRRHOSIS OF LIVER WITHOUT ASCITES, UNSPECIFIED HEPATIC CIRRHOSIS TYPE (HCC): ICD-10-CM

## 2024-10-25 DIAGNOSIS — K74.60 CIRRHOSIS (HCC): ICD-10-CM

## 2024-10-25 DIAGNOSIS — E11.65 POORLY CONTROLLED DIABETES MELLITUS (HCC): ICD-10-CM

## 2024-10-25 DIAGNOSIS — K40.90 INGUINAL HERNIA: ICD-10-CM

## 2024-10-25 DIAGNOSIS — Z11.59 ENCOUNTER FOR SCREENING FOR OTHER VIRAL DISEASES: ICD-10-CM

## 2024-10-25 LAB
AFP-TM SERPL-MCNC: 1.24 NG/ML (ref 0–9)
ALBUMIN SERPL BCG-MCNC: 3.2 G/DL (ref 3.5–5)
ALP SERPL-CCNC: 237 U/L (ref 34–104)
ALT SERPL W P-5'-P-CCNC: 15 U/L (ref 7–52)
ANION GAP SERPL CALCULATED.3IONS-SCNC: 6 MMOL/L (ref 4–13)
AST SERPL W P-5'-P-CCNC: 30 U/L (ref 13–39)
BASOPHILS # BLD AUTO: 0.03 THOUSANDS/ΜL (ref 0–0.1)
BASOPHILS NFR BLD AUTO: 1 % (ref 0–1)
BILIRUB SERPL-MCNC: 2.43 MG/DL (ref 0.2–1)
BUN SERPL-MCNC: 23 MG/DL (ref 5–25)
CALCIUM ALBUM COR SERPL-MCNC: 9.1 MG/DL (ref 8.3–10.1)
CALCIUM SERPL-MCNC: 8.5 MG/DL (ref 8.4–10.2)
CHLORIDE SERPL-SCNC: 104 MMOL/L (ref 96–108)
CHOLEST SERPL-MCNC: 84 MG/DL
CO2 SERPL-SCNC: 25 MMOL/L (ref 21–32)
CREAT SERPL-MCNC: 0.98 MG/DL (ref 0.6–1.3)
CREAT UR-MCNC: 166.5 MG/DL
EOSINOPHIL # BLD AUTO: 0.01 THOUSAND/ΜL (ref 0–0.61)
EOSINOPHIL NFR BLD AUTO: 0 % (ref 0–6)
ERYTHROCYTE [DISTWIDTH] IN BLOOD BY AUTOMATED COUNT: 15.9 % (ref 11.6–15.1)
EST. AVERAGE GLUCOSE BLD GHB EST-MCNC: 160 MG/DL
GFR SERPL CREATININE-BSD FRML MDRD: 78 ML/MIN/1.73SQ M
GLUCOSE P FAST SERPL-MCNC: 163 MG/DL (ref 65–99)
HAV AB SER QL IA: NORMAL
HBA1C MFR BLD: 7.2 %
HBV SURFACE AB SER-ACNC: <3 MIU/ML
HCT VFR BLD AUTO: 34.8 % (ref 36.5–49.3)
HDLC SERPL-MCNC: 14 MG/DL
HGB BLD-MCNC: 11.5 G/DL (ref 12–17)
IMM GRANULOCYTES # BLD AUTO: 0.02 THOUSAND/UL (ref 0–0.2)
IMM GRANULOCYTES NFR BLD AUTO: 0 % (ref 0–2)
LDLC SERPL CALC-MCNC: 55 MG/DL (ref 0–100)
LYMPHOCYTES # BLD AUTO: 0.81 THOUSANDS/ΜL (ref 0.6–4.47)
LYMPHOCYTES NFR BLD AUTO: 13 % (ref 14–44)
MCH RBC QN AUTO: 33.4 PG (ref 26.8–34.3)
MCHC RBC AUTO-ENTMCNC: 33 G/DL (ref 31.4–37.4)
MCV RBC AUTO: 101 FL (ref 82–98)
MICROALBUMIN UR-MCNC: 68.8 MG/L
MICROALBUMIN/CREAT 24H UR: 41 MG/G CREATININE (ref 0–30)
MONOCYTES # BLD AUTO: 0.47 THOUSAND/ΜL (ref 0.17–1.22)
MONOCYTES NFR BLD AUTO: 8 % (ref 4–12)
NEUTROPHILS # BLD AUTO: 4.71 THOUSANDS/ΜL (ref 1.85–7.62)
NEUTS SEG NFR BLD AUTO: 78 % (ref 43–75)
NRBC BLD AUTO-RTO: 0 /100 WBCS
PLATELET # BLD AUTO: 109 THOUSANDS/UL (ref 149–390)
PMV BLD AUTO: 11.9 FL (ref 8.9–12.7)
POTASSIUM SERPL-SCNC: 4.3 MMOL/L (ref 3.5–5.3)
PROT SERPL-MCNC: 7.7 G/DL (ref 6.4–8.4)
RBC # BLD AUTO: 3.44 MILLION/UL (ref 3.88–5.62)
SODIUM SERPL-SCNC: 135 MMOL/L (ref 135–147)
TRIGL SERPL-MCNC: 76 MG/DL
WBC # BLD AUTO: 6.05 THOUSAND/UL (ref 4.31–10.16)

## 2024-10-25 PROCEDURE — 80053 COMPREHEN METABOLIC PANEL: CPT

## 2024-10-25 PROCEDURE — 83036 HEMOGLOBIN GLYCOSYLATED A1C: CPT

## 2024-10-25 PROCEDURE — 85025 COMPLETE CBC W/AUTO DIFF WBC: CPT

## 2024-10-25 PROCEDURE — 80061 LIPID PANEL: CPT

## 2024-10-25 PROCEDURE — 86708 HEPATITIS A ANTIBODY: CPT

## 2024-10-25 PROCEDURE — 82105 ALPHA-FETOPROTEIN SERUM: CPT

## 2024-10-25 PROCEDURE — 86706 HEP B SURFACE ANTIBODY: CPT

## 2024-10-25 PROCEDURE — 82570 ASSAY OF URINE CREATININE: CPT

## 2024-10-25 PROCEDURE — 82043 UR ALBUMIN QUANTITATIVE: CPT

## 2024-10-25 PROCEDURE — 36415 COLL VENOUS BLD VENIPUNCTURE: CPT

## 2024-10-28 ENCOUNTER — OFFICE VISIT (OUTPATIENT)
Dept: INTERNAL MEDICINE CLINIC | Facility: CLINIC | Age: 69
End: 2024-10-28
Payer: COMMERCIAL

## 2024-10-28 VITALS
HEIGHT: 68 IN | HEART RATE: 73 BPM | BODY MASS INDEX: 26.22 KG/M2 | DIASTOLIC BLOOD PRESSURE: 84 MMHG | SYSTOLIC BLOOD PRESSURE: 132 MMHG | OXYGEN SATURATION: 99 % | WEIGHT: 173 LBS

## 2024-10-28 DIAGNOSIS — L03.113 CELLULITIS OF RIGHT HAND: Primary | ICD-10-CM

## 2024-10-28 DIAGNOSIS — K74.60 CIRRHOSIS OF LIVER WITHOUT ASCITES, UNSPECIFIED HEPATIC CIRRHOSIS TYPE (HCC): ICD-10-CM

## 2024-10-28 DIAGNOSIS — I10 BENIGN ESSENTIAL HYPERTENSION: ICD-10-CM

## 2024-10-28 DIAGNOSIS — E11.65 POORLY CONTROLLED DIABETES MELLITUS (HCC): ICD-10-CM

## 2024-10-28 DIAGNOSIS — R31.0 GROSS HEMATURIA: ICD-10-CM

## 2024-10-28 DIAGNOSIS — Z00.00 MEDICARE ANNUAL WELLNESS VISIT, SUBSEQUENT: ICD-10-CM

## 2024-10-28 DIAGNOSIS — R22.31 MASS OF SKIN OF RIGHT HAND: ICD-10-CM

## 2024-10-28 PROCEDURE — 99214 OFFICE O/P EST MOD 30 MIN: CPT | Performed by: INTERNAL MEDICINE

## 2024-10-28 PROCEDURE — G0439 PPPS, SUBSEQ VISIT: HCPCS | Performed by: INTERNAL MEDICINE

## 2024-10-28 RX ORDER — CEPHALEXIN 500 MG/1
500 CAPSULE ORAL 3 TIMES DAILY
Qty: 21 CAPSULE | Refills: 0 | Status: SHIPPED | OUTPATIENT
Start: 2024-10-28 | End: 2024-11-04

## 2024-10-28 NOTE — ASSESSMENT & PLAN NOTE
He is on Jardiance and metformin  Continue current medications  He is also on a statin  He did not tolerate ACEinh/ARB in the past  Lab Results   Component Value Date    HGBA1C 7.2 (H) 10/25/2024       Orders:    Hemoglobin A1C; Future    Comprehensive metabolic panel; Future    CBC and differential; Future    Albumin / creatinine urine ratio; Future    Lipid Panel with Direct LDL reflex; Future    TSH, 3rd generation with Free T4 reflex; Future

## 2024-10-28 NOTE — PROGRESS NOTES
Ambulatory Visit  Name: Bg Gruber      : 1955      MRN: [id number]  Encounter Provider: Lea Reyes, MD  Encounter Date: 10/28/2024   Encounter department: [facility]    Assessment & Plan  Cellulitis of right hand    Orders:    cephalexin (KEFLEX) 500 mg capsule; Take 1 capsule (500 mg total) by mouth 3 (three) times a day for 7 days    Mass of skin of right hand    Orders:    Ambulatory Referral to Dermatology; Future    Poorly controlled diabetes mellitus (HCC)    Lab Results   Component Value Date    HGBA1C 7.2 (H) 10/25/2024       Orders:    Hemoglobin A1C; Future    Comprehensive metabolic panel; Future    CBC and differential; Future    Albumin / creatinine urine ratio; Future    Lipid Panel with Direct LDL reflex; Future    TSH, 3rd generation with Free T4 reflex; Future    Medicare annual wellness visit, subsequent         Benign essential hypertension         Cirrhosis of liver without ascites, unspecified hepatic cirrhosis type (HCC)         Gross hematuria            Preventive health issues were discussed with patient, and age appropriate screening tests were ordered as noted in patient's After Visit Summary. Personalized health advice and appropriate referrals for health education or preventive services given if needed, as noted in patient's After Visit Summary.    History of Present Illness     HPI   Patient Care Team:  Lea Reyes, MD as PCP - General  Lea Reyes, MD as PCP - PCP-[facility] Attributed-Darrick  Shaka Davila DPM    Review of Systems  Medical History Reviewed by provider this encounter:  Tobacco  Allergies  Meds  Problems  Med Hx  Surg Hx  Fam Hx       Annual Wellness Visit Questionnaire   Bg is here for his Subsequent Wellness visit.     Health Risk Assessment:   Patient rates overall health as fair. Patient feels that their physical health rating is same. Patient is satisfied with their life. Eyesight was rated as same. Hearing  was rated as same. Patient feels that their emotional and mental health rating is same. Patients states they are never, rarely angry. Patient states they are sometimes unusually tired/fatigued. Pain experienced in the last 7 days has been some. Patient's pain rating has been 2/10. Patient states that he has experienced no weight loss or gain in last 6 months.     Depression Screening:   PHQ-2 Score: 0      Fall Risk Screening:   In the past year, patient has experienced: no history of falling in past year      Home Safety:  Patient does not have trouble with stairs inside or outside of their home. Patient has working smoke alarms and has working carbon monoxide detector. Home safety hazards include: none.     Nutrition:   Current diet is Regular.     Medications:   Patient is currently taking over-the-counter supplements. OTC medications include: see medication list. Patient is able to manage medications.     Activities of Daily Living (ADLs)/Instrumental Activities of Daily Living (IADLs):   Walk and transfer into and out of bed and chair?: Yes  Dress and groom yourself?: Yes    Bathe or shower yourself?: Yes    Feed yourself? Yes  Do your laundry/housekeeping?: Yes  Manage your money, pay your bills and track your expenses?: Yes  Make your own meals?: Yes    Do your own shopping?: Yes    Previous Hospitalizations:   Any hospitalizations or ED visits within the last 12 months?: Yes    How many hospitalizations have you had in the last year?: 1-2    PREVENTIVE SCREENINGS      Cardiovascular Screening:    General: Screening Current      Diabetes Screening:     General: Screening Not Indicated and History Diabetes      Colorectal Cancer Screening:     General: Screening Current      Prostate Cancer Screening:    General: Screening Current      Abdominal Aortic Aneurysm (AAA) Screening:    Risk factors include: age between 65-76 yo and tobacco use        Hepatitis C Screening:    General: Screening Current       No  "results found.    Objective     /84 (BP Location: Left arm, Patient Position: Sitting, Cuff Size: Standard)   Pulse 73   Ht 5' 8\" (1.727 m)   Wt 78.5 kg (173 lb)   SpO2 99%   BMI 26.30 kg/m²     Physical Exam    "

## 2024-10-28 NOTE — PROGRESS NOTES
Ambulatory Visit  Name: Bg Gruber      : 1955      MRN: [id number]  Encounter Provider: Lea Reyes, MD  Encounter Date: 10/28/2024   Encounter department: [facility]    Assessment & Plan  Cellulitis of right hand  Red irregularly shaped soft mass on the dorsum of the right hand  ?abscess or cellulitis or tumor  Start cephalexin  Appt with dermatology ([facility] made for him)  Advised to call if he develops fever, worsening symptoms  Orders:    cephalexin (KEFLEX) 500 mg capsule; Take 1 capsule (500 mg total) by mouth 3 (three) times a day for 7 days    Mass of skin of right hand    Orders:    Ambulatory Referral to Dermatology; Future    Poorly controlled diabetes mellitus (HCC)  He is on Jardiance and metformin  Continue current medications  He is also on a statin  He did not tolerate ACEinh/ARB in the past  Lab Results   Component Value Date    HGBA1C 7.2 (H) 10/25/2024       Orders:    Hemoglobin A1C; Future    Comprehensive metabolic panel; Future    CBC and differential; Future    Albumin / creatinine urine ratio; Future    Lipid Panel with Direct LDL reflex; Future    TSH, 3rd generation with Free T4 reflex; Future    Medicare annual wellness visit, subsequent         Benign essential hypertension  Reports hypotension and dizziness on amlodipine so stopped  He continues to take metoprolol       Cirrhosis of liver without ascites, unspecified hepatic cirrhosis type (HCC)  EGD/colonoscopy scheduled  F/u with GI       Gross hematuria  Mild erythema throughout the bladder on cystoscopy  F/u with urology              History of Present Illness     3 weeks of a mass on the right hand which has gotten larger with scant clear pink fluid drainage  No trauma to the hand to have caused it  Denies fever, chills  No limitation of hand use  Recent labs reviewed and A1C at 7.2, microalbuminuria, lipids very low, slightly low hgb, low platelet count, elevated ALP,  bilirubin            Review of Systems  Past Medical History:   Diagnosis Date    Abnormal liver function test     LAST ASSESSED: 13JUN2012    Anxiety     Athlete's foot     LAST ASSESSED: 08MAY2013    Callus     LAST ASSESSED: 08MAY2013    Carpal tunnel syndrome, unspecified upper limb     LAST ASSESSED: 02OCT2012    Chronic pain syndrome     LAST ASSESSED: 22OCT2013    Colon polyp     Diabetes mellitus (HCC)     Diabetes mellitus due to underlying condition, uncontrolled, with hyperglycemia (HCC) 7/25/2016    Transitioned From: Diabetes mellitus type 2, uncontrolled    Esophageal varices without bleeding, unspecified esophageal varices type (HCC) 3/27/2023    Herpes zoster ophthalmicus of right eye     LAST ASSESSED: 10NOV2016    Hyperglycemia     LAST ASSESSED: 08MAY2013    Hyperplastic colon polyp     Obesity 5/22/2017    Pericardial effusion     LAST ASSESSED: 13JUN2012    Positive colorectal cancer screening using Cologuard test 6/8/2021    Right lower quadrant abdominal pain 6/8/2021     Past Surgical History:   Procedure Laterality Date    COLONOSCOPY      HEMORROIDECTOMY      LUMBAR LAMINECTOMY      LAST ASSESSED: 07APR2015    TONSILLECTOMY AND ADENOIDECTOMY      WISDOM TOOTH EXTRACTION       Family History   Problem Relation Age of Onset    No Known Problems Father     Hypotension Family     Prostate cancer Family     Coronary artery disease Family     Diabetes Family     Diverticulitis Mother     Breast cancer Mother      Social History     Tobacco Use    Smoking status: Every Day     Current packs/day: 0.50     Types: Cigarettes    Smokeless tobacco: Never   Vaping Use    Vaping status: Never Used   Substance and Sexual Activity    Alcohol use: Yes     Comment: ocasional    Drug use: No    Sexual activity: Not Currently     Current Outpatient Medications on File Prior to Visit   Medication Sig    atorvastatin (LIPITOR) 20 mg tablet TAKE 1 TABLET BY MOUTH EVERY DAY    cholecalciferol (VITAMIN D3) 1,000  "units tablet Take 1 tablet (1,000 Units total) by mouth daily    Empagliflozin (Jardiance) 10 MG TABS tablet TAKE 1 TABLET BY MOUTH EVERY DAY    glucose blood (OneTouch Ultra) test strip Use 1 each 3 (three) times a day Use as instructed    insulin glargine (Toujeo SoloStar) 300 units/mL CONCENTRATED U-300 injection pen (1-unit dial) Inject 40 Units under the skin daily    Insulin Pen Needle (B-D UF III MINI PEN NEEDLES) 31G X 5 MM MISC Use daily    Lancets (OneTouch Delica Plus [id number]) MISC TEST DAILY    metFORMIN (GLUCOPHAGE-XR) 500 mg 24 hr tablet Take 2 tablets (1,000 mg total) by mouth daily with dinner For diabetes    metoprolol succinate (TOPROL-XL) 100 mg 24 hr tablet Take 1 tablet (100 mg total) by mouth daily    polyethylene glycol (MiraLax) 17 GM/SCOOP powder Take 238 g by mouth once for 1 dose Take 238 g my mouth. Use as directed    [DISCONTINUED] amLODIPine (NORVASC) 5 mg tablet Take 1 tablet (5 mg total) by mouth daily     Allergies   Allergen Reactions    Duloxetine Hcl Other (See Comments)    Lisinopril Cough    Olmesartan Medoxomil-Hctz      Annotation - 13Jun2012: Abnormal blood levels due to med     Immunization History   Administered Date(s) Administered    COVID-19 PFIZER VACCINE 0.3 ML IM 04/14/2021, 05/06/2021, 12/20/2021    Influenza Quadrivalent Preservative Free 3 years and older IM 12/29/2015    Influenza, recombinant, quadrivalent,injectable, preservative free 11/02/2018    Influenza, seasonal, injectable 10/02/2012, 05/02/2013, 12/18/2013    Pneumococcal Polysaccharide PPV23 11/02/2018    Td (adult), adsorbed 1955    Tdap 04/10/2019     Objective     /84 (BP Location: Left arm, Patient Position: Sitting, Cuff Size: Standard)   Pulse 73   Ht 5' 8\" (1.727 m)   Wt 78.5 kg (173 lb)   SpO2 99%   BMI 26.30 kg/m²     Physical Exam  Constitutional:       General: He is not in acute distress.     Appearance: He is ill-appearing.   Cardiovascular:      Rate and Rhythm: Regular " rhythm.      Heart sounds: Normal heart sounds.   Pulmonary:      Breath sounds: Normal breath sounds.   Abdominal:      Tenderness: There is no abdominal tenderness.   Musculoskeletal:      Right lower leg: No edema.      Left lower leg: No edema.   Skin:     Findings: Lesion (irregular shaped mass about an inch in diameter on the dorsum of the right hand) present.

## 2024-10-28 NOTE — PATIENT INSTRUCTIONS
Medicare Preventive Visit Patient Instructions  Thank you for completing your Welcome to Medicare Visit or Medicare Annual Wellness Visit today. Your next wellness visit will be due in one year (10/29/2025).  The screening/preventive services that you may require over the next 5-10 years are detailed below. Some tests may not apply to you based off risk factors and/or age. Screening tests ordered at today's visit but not completed yet may show as past due. Also, please note that scanned in results may not display below.  Preventive Screenings:  Service Recommendations Previous Testing/Comments   Colorectal Cancer Screening  Colonoscopy    Fecal Occult Blood Test (FOBT)/Fecal Immunochemical Test (FIT)  Fecal DNA/Cologuard Test  Flexible Sigmoidoscopy Age: 45-75 years old   Colonoscopy: every 10 years (May be performed more frequently if at higher risk)  OR  FOBT/FIT: every 1 year  OR  Cologuard: every 3 years  OR  Sigmoidoscopy: every 5 years  Screening may be recommended earlier than age 45 if at higher risk for colorectal cancer. Also, an individualized decision between you and your healthcare provider will decide whether screening between the ages of 76-85 would be appropriate. Colonoscopy: 10/14/2021  FOBT/FIT: Not on file  Cologuard: Not on file  Sigmoidoscopy: Not on file          Prostate Cancer Screening Individualized decision between patient and health care provider in men between ages of 55-69   Medicare will cover every 12 months beginning on the day after your 50th birthday PSA: 0.52 ng/mL           Hepatitis C Screening Once for adults born between 1945 and 1965  More frequently in patients at high risk for Hepatitis C Hep C Antibody: 06/13/2024        Diabetes Screening 1-2 times per year if you're at risk for diabetes or have pre-diabetes Fasting glucose: 163 mg/dL (10/25/2024)  A1C: 7.2 % (10/25/2024)      Cholesterol Screening Once every 5 years if you don't have a lipid disorder. May order more  often based on risk factors. Lipid panel: 10/25/2024         Other Preventive Screenings Covered by Medicare:  Abdominal Aortic Aneurysm (AAA) Screening: covered once if your at risk. You're considered to be at risk if you have a family history of AAA or a male between the age of 65-75 who smoking at least 100 cigarettes in your lifetime.  Lung Cancer Screening: covers low dose CT scan once per year if you meet all of the following conditions: (1) Age 55-77; (2) No signs or symptoms of lung cancer; (3) Current smoker or have quit smoking within the last 15 years; (4) You have a tobacco smoking history of at least 20 pack years (packs per day x number of years you smoked); (5) You get a written order from a healthcare provider.  Glaucoma Screening: covered annually if you're considered high risk: (1) You have diabetes OR (2) Family history of glaucoma OR (3)  aged 50 and older OR (4)  American aged 65 and older  Osteoporosis Screening: covered every 2 years if you meet one of the following conditions: (1) Have a vertebral abnormality; (2) On glucocorticoid therapy for more than 3 months; (3) Have primary hyperparathyroidism; (4) On osteoporosis medications and need to assess response to drug therapy.  HIV Screening: covered annually if you're between the age of 15-65. Also covered annually if you are younger than 15 and older than 65 with risk factors for HIV infection. For pregnant patients, it is covered up to 3 times per pregnancy.    Immunizations:  Immunization Recommendations   Influenza Vaccine Annual influenza vaccination during flu season is recommended for all persons aged >= 6 months who do not have contraindications   Pneumococcal Vaccine   * Pneumococcal conjugate vaccine = PCV13 (Prevnar 13), PCV15 (Vaxneuvance), PCV20 (Prevnar 20)  * Pneumococcal polysaccharide vaccine = PPSV23 (Pneumovax) Adults 19-65 yo with certain risk factors or if 65+ yo  If never received any pneumonia  vaccine: recommend Prevnar 20 (PCV20)  Give PCV20 if previously received 1 dose of PCV13 or PPSV23   Hepatitis B Vaccine 3 dose series if at intermediate or high risk (ex: diabetes, end stage renal disease, liver disease)   Respiratory syncytial virus (RSV) Vaccine - COVERED BY MEDICARE PART D  * RSVPreF3 (Arexvy) CDC recommends that adults 60 years of age and older may receive a single dose of RSV vaccine using shared clinical decision-making (SCDM)   Tetanus (Td) Vaccine - COST NOT COVERED BY MEDICARE PART B Following completion of primary series, a booster dose should be given every 10 years to maintain immunity against tetanus. Td may also be given as tetanus wound prophylaxis.   Tdap Vaccine - COST NOT COVERED BY MEDICARE PART B Recommended at least once for all adults. For pregnant patients, recommended with each pregnancy.   Shingles Vaccine (Shingrix) - COST NOT COVERED BY MEDICARE PART B  2 shot series recommended in those 19 years and older who have or will have weakened immune systems or those 50 years and older     Health Maintenance Due:      Topic Date Due   • Colorectal Cancer Screening  10/13/2024   • Hepatitis C Screening  Completed     Immunizations Due:      Topic Date Due   • Hepatitis A Vaccine (1 of 2 - Risk 2-dose series) Never done   • Hepatitis B Vaccine (1 of 3 - Risk 3-dose series) Never done   • Pneumococcal Vaccine: 65+ Years (2 of 2 - PCV) 11/02/2019   • Influenza Vaccine (1) 09/01/2024   • COVID-19 Vaccine (4 - 2023-24 season) 09/01/2024     Advance Directives   What are advance directives?  Advance directives are legal documents that state your wishes and plans for medical care. These plans are made ahead of time in case you lose your ability to make decisions for yourself. Advance directives can apply to any medical decision, such as the treatments you want, and if you want to donate organs.   What are the types of advance directives?  There are many types of advance directives, and  each state has rules about how to use them. You may choose a combination of any of the following:  Living will:  This is a written record of the treatment you want. You can also choose which treatments you do not want, which to limit, and which to stop at a certain time. This includes surgery, medicine, IV fluid, and tube feedings.   Durable power of  for healthcare (DPAHC):  This is a written record that states who you want to make healthcare choices for you when you are unable to make them for yourself. This person, called a proxy, is usually a family member or a friend. You may choose more than 1 proxy.  Do not resuscitate (DNR) order:  A DNR order is used in case your heart stops beating or you stop breathing. It is a request not to have certain forms of treatment, such as CPR. A DNR order may be included in other types of advance directives.  Medical directive:  This covers the care that you want if you are in a coma, near death, or unable to make decisions for yourself. You can list the treatments you want for each condition. Treatment may include pain medicine, surgery, blood transfusions, dialysis, IV or tube feedings, and a ventilator (breathing machine).  Values history:  This document has questions about your views, beliefs, and how you feel and think about life. This information can help others choose the care that you would choose.  Why are advance directives important?  An advance directive helps you control your care. Although spoken wishes may be used, it is better to have your wishes written down. Spoken wishes can be misunderstood, or not followed. Treatments may be given even if you do not want them. An advance directive may make it easier for your family to make difficult choices about your care.   Cigarette Smoking and Your Health   Risks to your health if you smoke:  Nicotine and other chemicals found in tobacco damage every cell in your body. Even if you are a light smoker, you have an  increased risk for cancer, heart disease, and lung disease. If you are pregnant or have diabetes, smoking increases your risk for complications.   Benefits to your health if you stop smoking:   You decrease respiratory symptoms such as coughing, wheezing, and shortness of breath.   You reduce your risk for cancers of the lung, mouth, throat, kidney, bladder, pancreas, stomach, and cervix. If you already have cancer, you increase the benefits of chemotherapy. You also reduce your risk for cancer returning or a second cancer from developing.   You reduce your risk for heart disease, blood clots, heart attack, and stroke.   You reduce your risk for lung infections, and diseases such as pneumonia, asthma, chronic bronchitis, and emphysema.  Your circulation improves. More oxygen can be delivered to your body. If you have diabetes, you lower your risk for complications, such as kidney, artery, and eye diseases. You also lower your risk for nerve damage. Nerve damage can lead to amputations, poor vision, and blindness.  You improve your body's ability to heal and to fight infections.  For more information and support to stop smoking:   [email or web address]  Phone: 6- [phone] - [phone]  Web Address: www.[employer]  Weight Management   Why it is important to manage your weight:  Being overweight increases your risk of health conditions such as heart disease, high blood pressure, type 2 diabetes, and certain types of cancer. It can also increase your risk for osteoarthritis, sleep apnea, and other respiratory problems. Aim for a slow, steady weight loss. Even a small amount of weight loss can lower your risk of health problems.  How to lose weight safely:  A safe and healthy way to lose weight is to eat fewer calories and get regular exercise. You can lose up about 1 pound a week by decreasing the number of calories you eat by 500 calories each day.   Healthy meal plan for weight management:  A healthy meal plan includes a variety  of foods, contains fewer calories, and helps you stay healthy. A healthy meal plan includes the following:  Eat whole-grain foods more often.  A healthy meal plan should contain fiber. Fiber is the part of grains, fruits, and vegetables that is not broken down by your body. Whole-grain foods are healthy and provide extra fiber in your diet. Some examples of whole-grain foods are whole-wheat breads and pastas, oatmeal, brown rice, and bulgur.  Eat a variety of vegetables every day.  Include dark, leafy greens such as spinach, kale, jennifer greens, and mustard greens. Eat yellow and orange vegetables such as carrots, sweet potatoes, and winter squash.   Eat a variety of fruits every day.  Choose fresh or canned fruit (canned in its own juice or light syrup) instead of juice. Fruit juice has very little or no fiber.  Eat low-fat dairy foods.  Drink fat-free (skim) milk or 1% milk. Eat fat-free yogurt and low-fat cottage cheese. Try low-fat cheeses such as mozzarella and other reduced-fat cheeses.  Choose meat and other protein foods that are low in fat.  Choose beans or other legumes such as split peas or lentils. Choose fish, skinless poultry (chicken or turkey), or lean cuts of red meat (beef or pork). Before you cook meat or poultry, cut off any visible fat.   Use less fat and oil.  Try baking foods instead of frying them. Add less fat, such as margarine, sour cream, regular salad dressing and mayonnaise to foods. Eat fewer high-fat foods. Some examples of high-fat foods include french fries, doughnuts, ice cream, and cakes.  Eat fewer sweets.  Limit foods and drinks that are high in sugar. This includes candy, cookies, regular soda, and sweetened drinks.  Exercise:  Exercise at least 30 minutes per day on most days of the week. Some examples of exercise include walking, biking, dancing, and swimming. You can also fit in more physical activity by taking the stairs instead of the elevator or parking farther away from  stores. Ask your healthcare provider about the best exercise plan for you.      © Copyright [employer] 2018 Information is for End User's use only and may not be sold, redistributed or otherwise used for commercial purposes. All illustrations and images included in CareNotes® are the copyrighted property of [employer] Inc[employer] or [employer]

## 2024-10-29 ENCOUNTER — LAB REQUISITION (OUTPATIENT)
Dept: LAB | Facility: HOSPITAL | Age: 69
End: 2024-10-29
Payer: COMMERCIAL

## 2024-10-29 DIAGNOSIS — B95.8 UNSPECIFIED STAPHYLOCOCCUS AS THE CAUSE OF DISEASES CLASSIFIED ELSEWHERE: ICD-10-CM

## 2024-10-29 DIAGNOSIS — A49.9 BACTERIAL INFECTION, UNSPECIFIED: ICD-10-CM

## 2024-10-29 PROCEDURE — 87186 SC STD MICRODIL/AGAR DIL: CPT | Performed by: PHYSICIAN ASSISTANT

## 2024-10-29 PROCEDURE — 87205 SMEAR GRAM STAIN: CPT | Performed by: PHYSICIAN ASSISTANT

## 2024-10-29 PROCEDURE — 87070 CULTURE OTHR SPECIMN AEROBIC: CPT | Performed by: PHYSICIAN ASSISTANT

## 2024-10-29 PROCEDURE — 87077 CULTURE AEROBIC IDENTIFY: CPT | Performed by: PHYSICIAN ASSISTANT

## 2024-10-29 NOTE — ASSESSMENT & PLAN NOTE
Lab Results   Component Value Date    HGBA1C 7.2 (H) 10/25/2024       Orders:    Hemoglobin A1C; Future    Comprehensive metabolic panel; Future    CBC and differential; Future    Albumin / creatinine urine ratio; Future    Lipid Panel with Direct LDL reflex; Future    TSH, 3rd generation with Free T4 reflex; Future

## 2024-11-02 LAB
BACTERIA WND AEROBE CULT: ABNORMAL
BACTERIA WND AEROBE CULT: ABNORMAL
GRAM STN SPEC: ABNORMAL
GRAM STN SPEC: ABNORMAL

## 2024-11-05 ENCOUNTER — TELEPHONE (OUTPATIENT)
Age: 69
End: 2024-11-05

## 2024-11-05 NOTE — TELEPHONE ENCOUNTER
See 10/25/24 lab result note    Left message on patient's voice mail. HAV and HBV recommended given non-immunity.

## 2024-11-19 ENCOUNTER — TELEPHONE (OUTPATIENT)
Age: 69
End: 2024-11-19

## 2024-11-19 NOTE — TELEPHONE ENCOUNTER
Patient returned call but was unsure who had attempted to reach him. There was no note in chart either. Advised patient that there will be a note placed in his chart regarding this. Patient has not been seen by an ENT provider nor does he have a referral on file.

## 2024-12-09 ENCOUNTER — TELEPHONE (OUTPATIENT)
Dept: INTERNAL MEDICINE CLINIC | Facility: CLINIC | Age: 69
End: 2024-12-09

## 2024-12-09 NOTE — TELEPHONE ENCOUNTER
Fax received from [facility]. Called patient just enrolled with [facility] and will not find out till 12/27. Scanned into chart and hold till after this date

## 2024-12-27 DIAGNOSIS — E11.65 POORLY CONTROLLED DIABETES MELLITUS (HCC): ICD-10-CM

## 2024-12-27 NOTE — TELEPHONE ENCOUNTER
Reason for call:   [x] Refill   [] Prior Auth  [] Other:     Office:   [x] PCP/Provider -  Lea Reyes, MD . [facility]  [] Specialty/Provider -     Medication:     Empagliflozin (Jardiance) 10 MG TABS tablet       Dose/Frequency: : TAKE 1 TABLET BY MOUTH EVERY DAY,     Quantity: 90    Pharmacy: [facility]/pharmacy #[id number] - [place], [place] - [address]      Does the patient have enough for 3 days?   [] Yes   [x] No - Send as HP to POD

## 2024-12-28 DIAGNOSIS — E11.65 POORLY CONTROLLED DIABETES MELLITUS (HCC): ICD-10-CM

## 2024-12-28 RX ORDER — METOPROLOL SUCCINATE 100 MG/1
100 TABLET, EXTENDED RELEASE ORAL DAILY
Qty: 90 TABLET | Refills: 1 | Status: SHIPPED | OUTPATIENT
Start: 2024-12-28

## 2025-01-18 ENCOUNTER — NURSE TRIAGE (OUTPATIENT)
Dept: OTHER | Facility: OTHER | Age: 70
End: 2025-01-18

## 2025-01-18 DIAGNOSIS — E11.65 POORLY CONTROLLED DIABETES MELLITUS (HCC): ICD-10-CM

## 2025-01-18 RX ORDER — METOPROLOL SUCCINATE 100 MG/1
100 TABLET, EXTENDED RELEASE ORAL DAILY
Qty: 90 TABLET | Refills: 1 | Status: SHIPPED | OUTPATIENT
Start: 2025-01-18

## 2025-01-18 NOTE — TELEPHONE ENCOUNTER
"Reason for Disposition  • [1] Caller requesting a prescription renewal (no refills left), no triage required, AND [2] triager able to renew prescription per department policy    Answer Assessment - Initial Assessment Questions  1. DRUG NAME: \"What medicine do you need to have refilled?\"        Metoprolol succinate    2. REFILLS REMAINING: \"How many refills are remaining?\" (Note: The label on the medicine or pill bottle will show how many refills are remaining. If there are no refills remaining, then a renewal may be needed.)        Patient called pharmacy and was told rx was not received. Rx resent to [facility] on [address] in [place]    Protocols used: Medication Refill and Renewal Call-Adult-    "

## 2025-01-18 NOTE — TELEPHONE ENCOUNTER
"Regarding: Metoprolol  ----- Message from Allyson FRIEND sent at 1/18/2025  9:00 AM EST -----  \"I need a refill on my metoprolol rx. I only have one left\"    "

## 2025-01-23 ENCOUNTER — OFFICE VISIT (OUTPATIENT)
Dept: URGENT CARE | Age: 70
End: 2025-01-23
Payer: COMMERCIAL

## 2025-01-23 ENCOUNTER — APPOINTMENT (OUTPATIENT)
Dept: RADIOLOGY | Age: 70
End: 2025-01-23
Payer: COMMERCIAL

## 2025-01-23 VITALS
OXYGEN SATURATION: 92 % | BODY MASS INDEX: 23.64 KG/M2 | TEMPERATURE: 96.9 F | DIASTOLIC BLOOD PRESSURE: 88 MMHG | HEIGHT: 68 IN | HEART RATE: 86 BPM | RESPIRATION RATE: 20 BRPM | WEIGHT: 156 LBS | SYSTOLIC BLOOD PRESSURE: 158 MMHG

## 2025-01-23 DIAGNOSIS — R06.02 SHORTNESS OF BREATH: Primary | ICD-10-CM

## 2025-01-23 DIAGNOSIS — R06.02 SHORTNESS OF BREATH: ICD-10-CM

## 2025-01-23 DIAGNOSIS — J06.9 ACUTE URI: ICD-10-CM

## 2025-01-23 LAB
ATRIAL RATE: 65 BPM
P AXIS: 74 DEGREES
PR INTERVAL: 130 MS
QRS AXIS: 83 DEGREES
QRSD INTERVAL: 92 MS
QT INTERVAL: 422 MS
QTC INTERVAL: 439 MS
T WAVE AXIS: 63 DEGREES
VENTRICULAR RATE: 65 BPM

## 2025-01-23 PROCEDURE — 71046 X-RAY EXAM CHEST 2 VIEWS: CPT

## 2025-01-23 PROCEDURE — 93010 ELECTROCARDIOGRAM REPORT: CPT | Performed by: INTERNAL MEDICINE

## 2025-01-23 PROCEDURE — 99215 OFFICE O/P EST HI 40 MIN: CPT

## 2025-01-23 PROCEDURE — 93005 ELECTROCARDIOGRAM TRACING: CPT

## 2025-01-23 RX ORDER — GUAIFENESIN 600 MG/1
1200 TABLET, EXTENDED RELEASE ORAL EVERY 12 HOURS SCHEDULED
Qty: 30 TABLET | Refills: 0 | Status: SHIPPED | OUTPATIENT
Start: 2025-01-23

## 2025-01-23 RX ORDER — FLUTICASONE PROPIONATE 50 MCG
1 SPRAY, SUSPENSION (ML) NASAL DAILY
Qty: 9.9 ML | Refills: 0 | Status: SHIPPED | OUTPATIENT
Start: 2025-01-23

## 2025-01-23 RX ORDER — ALBUTEROL SULFATE 90 UG/1
2 INHALANT RESPIRATORY (INHALATION) EVERY 6 HOURS PRN
Qty: 8.5 G | Refills: 0 | Status: SHIPPED | OUTPATIENT
Start: 2025-01-23

## 2025-01-23 RX ORDER — PREDNISONE 10 MG/1
TABLET ORAL
Qty: 21 TABLET | Refills: 0 | Status: SHIPPED | OUTPATIENT
Start: 2025-01-23

## 2025-01-23 RX ORDER — IPRATROPIUM BROMIDE AND ALBUTEROL SULFATE 2.5; .5 MG/3ML; MG/3ML
3 SOLUTION RESPIRATORY (INHALATION) ONCE
Status: COMPLETED | OUTPATIENT
Start: 2025-01-23 | End: 2025-01-23

## 2025-01-23 RX ORDER — AZITHROMYCIN 250 MG/1
TABLET, FILM COATED ORAL
Qty: 6 TABLET | Refills: 0 | Status: SHIPPED | OUTPATIENT
Start: 2025-01-23 | End: 2025-01-27

## 2025-01-23 RX ADMIN — IPRATROPIUM BROMIDE AND ALBUTEROL SULFATE 3 ML: 2.5; .5 SOLUTION RESPIRATORY (INHALATION) at 13:24

## 2025-01-23 NOTE — PATIENT INSTRUCTIONS
Take antibiotic- Zithromax as directed.  Recommend daily probiotic while on antibiotic or eat yogurt with live cultures daily while on antibiotic.       May start Prednisone taper. The best time to take a steroid is in the morning with breakfast. Do not take additional Motrin/Ibuproven/Advil while on the Prednisone.  You may take additional Tylenol as needed for pain/fever.   You may take Tylenol for pain and fever.    Albuterol inhaler for wheezing and shortness of breath.  Mucinex for cough during the day.  Flonase- one spray each nostril daily for nasal congestions.    Please go to the Emergency Department if you develop any increased Shortness of Breath, Difficulty breathing, develop any Chest pain or fevers.    Follow up with PCP for recheck in 1-2 days.    Rest, increase fluids, good hand washing.  Please follow up with your family doctor if no improvement in 1-2 days.     Nearly all of upper respiratory infections in adults are the result of viruses. These viruses include COVID, flu, RSV, adenoviruses and other coronaviruses. Antibiotics do not treat viruses and are not recommended or indicated at this time.   Take over the counter medications as needed.   Recommend over the counter decongestants as needed and age appropriate.   Neti Pot rinses and saline nasal sprays may also help clear nasal and/or sinus congestion. Frequent suctioning (before/after naps and nighttime sleep) can help symptoms in infants and young children  Recommend Mucinex to assist in the break-up of chest congestion in adults. Recommend Zarbee's cold over the counter treatments for young children (under the age of 2).   Also may consider over the counter allergy medications such as Allegra-D and Zyrtec.   If symptoms persist for 7+ days, follow-up with primary care provider or return to clinic to discuss appropriateness of antibiotics.   Vaccination is important to help prevent the spread of disease and infants. Vaccines are available  for COVID and influenza for ages 6 months and older. Please discuss scheduling vaccines with your PCP.    If symptoms worsen, shortness of breath develops, you experience severe sinus pain, difficulty swallowing or changes in voice, report to the emergency department.    RSV: When It's More Than Just a Cold - [email or web address]

## 2025-01-23 NOTE — PROGRESS NOTES
[facility]        NAME: Bg Gruber is a 69 y.o. male  : 1955    MRN: [id number]  DATE: 2025  TIME: 3:32 PM    Assessment and Plan   Shortness of breath [R06.02]  1. Shortness of breath  ipratropium-albuterol (DUO-NEB) 0.5-2.5 mg/3 mL inhalation solution 3 mL    XR chest pa and lateral    albuterol (ProAir HFA) 90 mcg/act inhaler    azithromycin (ZITHROMAX) 250 mg tablet    fluticasone (FLONASE) 50 mcg/act nasal spray    guaiFENesin (MUCINEX) 600 mg 12 hr tablet    ECG 12 lead    predniSONE 10 mg tablet    DISCONTINUED: amoxicillin-clavulanate (AUGMENTIN) 875-125 mg per tablet      2. Acute URI  albuterol (ProAir HFA) 90 mcg/act inhaler        Initial O2 Sat 86% on RA.  Pt with no increased work of breathing, able to speak in full sentences, reports mild DRIVER after walking back from the [place].    Duo neb given in office.  Pt O2 Sat improved to 88% on RA.  PulseOx rechecked on pt's earlobe- 94%.  Pt denies any changes in breathing or SOB following neb.    CXR: No acute cardiopulmonary findings identified by me; pending final read.  EKG:rate 65 with PVC, no acute ST changes or elevations; pending final read.  Ambulated with pt around clinic, O2Sat improved to 94% on earlobe probe.  Pt denies DRIVER or breathing difficulty.    Concern for atypical pneumonia vs copd exacerbation. Given pt's long smoking history 0.5ppd for 40 years, will initiate abx therapy and prednisone therapy.  Albuterol  MDI for sob.  Mucinex for cough.    Strict ER precautions.      Patient Instructions       Follow up with PCP in 1-2 days.  Proceed to  ER if symptoms worsen.    If tests have been performed at [facility], our office will contact you with results if changes need to be made to the care plan discussed with you at the visit.  [facility]    Chief Complaint     Chief Complaint   Patient presents with   • Cold Like Symptoms     Pt states he been having dry cough,  sore throat, sneezing and dry sinuses for past week. Feels crackling in lungs and SOB for past three days.          History of Present Illness       Pt is a 69 year old male with a history of high blood pressure, and not insulin-dependent diabetic presenting with 5 days of increased shortness of breath.  He reports having generalized URI symptoms including cough, congestion, runny nose.  He reports over the last 3 days he Increasingly short of breath with exertion, increased mucus productive cough. He denies chest pain, fever, chills.  Not taken anything for symptoms.  He is able to talk in full sentences occultly.  He reports smoking half pack cigarettes per day for approximately 40 years.  Reports he has only been smoking 1 cigarette/day for the past 3 days due to symptoms.        Review of Systems   Review of Systems   Constitutional:  Negative for chills, fatigue and fever.   HENT:  Positive for congestion, sneezing and sore throat.    Respiratory:  Positive for cough and shortness of breath. Negative for chest tightness and wheezing.    Cardiovascular:  Negative for chest pain and palpitations.         Current Medications       Current Outpatient Medications:   •  albuterol (ProAir HFA) 90 mcg/act inhaler, Inhale 2 puffs every 6 (six) hours as needed for wheezing or shortness of breath, Disp: 8.5 g, Rfl: 0  •  atorvastatin (LIPITOR) 20 mg tablet, TAKE 1 TABLET BY MOUTH EVERY DAY, Disp: 90 tablet, Rfl: 3  •  azithromycin (ZITHROMAX) 250 mg tablet, Take 2 tablets today then 1 tablet daily x 4 days, Disp: 6 tablet, Rfl: 0  •  cholecalciferol (VITAMIN D3) 1,000 units tablet, Take 1 tablet (1,000 Units total) by mouth daily, Disp: , Rfl:   •  Empagliflozin (Jardiance) 10 MG TABS tablet, Take 1 tablet (10 mg total) by mouth daily, Disp: 90 tablet, Rfl: 1  •  fluticasone (FLONASE) 50 mcg/act nasal spray, 1 spray into each nostril daily, Disp: 9.9 mL, Rfl: 0  •  glucose blood (OneTouch Ultra) test strip, Use 1 each 3  (three) times a day Use as instructed, Disp: 300 strip, Rfl: 1  •  guaiFENesin (MUCINEX) 600 mg 12 hr tablet, Take 2 tablets (1,200 mg total) by mouth every 12 (twelve) hours, Disp: 30 tablet, Rfl: 0  •  insulin glargine (Toujeo SoloStar) 300 units/mL CONCENTRATED U-300 injection pen (1-unit dial), Inject 40 Units under the skin daily, Disp: 4.5 mL, Rfl: 5  •  Lancets (OneTouch Delica Plus [id number]) MISC, TEST DAILY, Disp: 100 each, Rfl: 1  •  metFORMIN (GLUCOPHAGE-XR) 500 mg 24 hr tablet, Take 2 tablets (1,000 mg total) by mouth daily with dinner For diabetes, Disp: , Rfl:   •  metoprolol succinate (TOPROL-XL) 100 mg 24 hr tablet, Take 1 tablet (100 mg total) by mouth daily, Disp: 90 tablet, Rfl: 1  •  predniSONE 10 mg tablet, 6 Tabs day 1, 5 tabs day 2, 4 tabs day 3, 3 tabs day 4, 2 tabs day 5, 1 tab day 6, Disp: 21 tablet, Rfl: 0  •  Insulin Pen Needle (B-D UF III MINI PEN NEEDLES) 31G X 5 MM MISC, Use daily, Disp: 100 each, Rfl: 6  •  polyethylene glycol (MiraLax) 17 GM/SCOOP powder, Take 238 g by mouth once for 1 dose Take 238 g my mouth. Use as directed, Disp: 238 g, Rfl: 0  No current facility-administered medications for this visit.    Current Allergies     Allergies as of 01/23/2025 - Reviewed 01/23/2025   Allergen Reaction Noted   • Duloxetine hcl Other (See Comments) 08/01/2019   • Lisinopril Cough 06/13/2012   • Olmesartan medoxomil-hctz  06/13/2012            The following portions of the patient's history were reviewed and updated as appropriate: allergies, current medications, past family history, past medical history, past social history, past surgical history and problem list.     Past Medical History:   Diagnosis Date   • Abnormal liver function test     LAST ASSESSED: 13JUN2012   • Anxiety    • Athlete's foot     LAST ASSESSED: 08MAY2013   • Callus     LAST ASSESSED: 08MAY2013   • Carpal tunnel syndrome, unspecified upper limb     LAST ASSESSED: 02OCT2012   • Chronic pain syndrome     LAST  "ASSESSED: 22OCT2013   • Colon polyp    • Diabetes mellitus (HCC)    • Diabetes mellitus due to underlying condition, uncontrolled, with hyperglycemia ([facility]) 7/25/2016    Transitioned From: Diabetes mellitus type 2, uncontrolled   • Esophageal varices without bleeding, unspecified esophageal varices type ([facility]) 3/27/2023   • Herpes zoster ophthalmicus of right eye     LAST ASSESSED: 10NOV2016   • Hyperglycemia     LAST ASSESSED: 08MAY2013   • Hyperplastic colon polyp    • Obesity 5/22/2017   • Pericardial effusion     LAST ASSESSED: 13JUN2012   • Positive colorectal cancer screening using Cologuard test 6/8/2021   • Right lower quadrant abdominal pain 6/8/2021       Past Surgical History:   Procedure Laterality Date   • COLONOSCOPY     • HEMORROIDECTOMY     • LUMBAR LAMINECTOMY      LAST ASSESSED: 07APR2015   • TONSILLECTOMY AND ADENOIDECTOMY     • WISDOM TOOTH EXTRACTION         Family History   Problem Relation Age of Onset   • No Known Problems Father    • Hypotension Family    • Prostate cancer Family    • Coronary artery disease Family    • Diabetes Family    • Diverticulitis Mother    • Breast cancer Mother          Medications have been verified.        Objective   /88   Pulse 86   Temp (!) 96.9 °F (36.1 °C)   Resp 20   Ht 5' 8\" (1.727 m)   Wt 70.8 kg (156 lb)   SpO2 92%   BMI 23.72 kg/m²   No LMP for male patient.       Physical Exam     Physical Exam  Vitals and nursing note reviewed.   Constitutional:       General: He is not in acute distress.     Appearance: He is not ill-appearing.      Comments: Looks older than stated age   HENT:      Head: Normocephalic.      Right Ear: Tympanic membrane normal.      Left Ear: Tympanic membrane normal.      Nose: Congestion and rhinorrhea present.      Mouth/Throat:      Mouth: Mucous membranes are dry.      Pharynx: Posterior oropharyngeal erythema present.   Cardiovascular:      Rate and Rhythm: Normal rate and regular rhythm.      Pulses: Normal pulses. "   Pulmonary:      Effort: Pulmonary effort is normal. No respiratory distress.      Breath sounds: No stridor. Wheezing, rhonchi and rales present.      Comments: Able to speak in full sentences without purse lip breathing.  Relaxed stance, equal rise and fall of chest without retractions or tripoding.  Chest:      Chest wall: No tenderness.   Abdominal:      Palpations: Abdomen is soft.   Skin:     General: Skin is warm.      Capillary Refill: Capillary refill takes less than 2 seconds.   Neurological:      General: No focal deficit present.      Mental Status: He is alert.

## 2025-01-28 ENCOUNTER — TELEPHONE (OUTPATIENT)
Dept: UROLOGY | Facility: AMBULATORY SURGERY CENTER | Age: 70
End: 2025-01-28

## 2025-01-28 NOTE — TELEPHONE ENCOUNTER
The patient has an appointment on 2/10, and the insurance the patient has does not participate with the urology office.  So I have made the patient as self pay for the visit.  If the patient wants to use their insurance then he needs to go to a different facility.

## 2025-01-29 NOTE — TELEPHONE ENCOUNTER
Pt calling with the list of providers that are on his approved list.  Pt  stated this is the list they sent him     It says [facility] urology  Dean Jasmine    Please advise.

## 2025-01-29 NOTE — TELEPHONE ENCOUNTER
I called and spoke to the patient  regarding his appointment on 2/10, and explained  the insurance the patient has does not participate with the urology office.    -He has the option of not using his insurance and  for the visit. ( So I have made the patient as self pay )    -If the patient wants to use their insurance then he needs to go to a different facility. ( The 800 number on the back of his insurance card can assist him in finding a provider).    The patient decided to cancel the appointment.

## 2025-01-29 NOTE — TELEPHONE ENCOUNTER
Pt calling back in regards to insurance issue.     Pt sts he spoke with insurance and they provided him a list of providers that would be in network which included our office on [address] in [place]. Advised that insurance is not PAR with our office and asked which providers were listed and pt could not recall.     Pt understood and will call back if he has any further questions.

## 2025-01-31 NOTE — TELEPHONE ENCOUNTER
"LVM to let PT know of the \"previous note\" regarding [facility] NOT accepting his ins and that he would need to sign the note as stated.  To make and apt or any questions please call the CB# and you can also ask for the  for further assistance.  "

## 2025-02-12 ENCOUNTER — TELEPHONE (OUTPATIENT)
Age: 70
End: 2025-02-12

## 2025-02-12 NOTE — TELEPHONE ENCOUNTER
Patient called in to reschedule colonoscopy and EGD from 3/14/2025 to 5/7/2025 due to insurance issues. Patient will call back once he receives ID cards for [employer].

## 2025-03-16 DIAGNOSIS — R06.02 SHORTNESS OF BREATH: ICD-10-CM

## 2025-03-19 RX ORDER — FLUTICASONE PROPIONATE 50 MCG
SPRAY, SUSPENSION (ML) NASAL
Qty: 24 ML | Refills: 1 | OUTPATIENT
Start: 2025-03-19

## 2025-04-10 ENCOUNTER — OFFICE VISIT (OUTPATIENT)
Age: 70
End: 2025-04-10
Payer: COMMERCIAL

## 2025-04-10 VITALS
WEIGHT: 177.2 LBS | HEART RATE: 70 BPM | DIASTOLIC BLOOD PRESSURE: 88 MMHG | HEIGHT: 68 IN | SYSTOLIC BLOOD PRESSURE: 118 MMHG | BODY MASS INDEX: 26.86 KG/M2

## 2025-04-10 DIAGNOSIS — I86.8 INTRA-ABDOMINAL VARICES: ICD-10-CM

## 2025-04-10 DIAGNOSIS — R74.8 ELEVATED ALKALINE PHOSPHATASE LEVEL: ICD-10-CM

## 2025-04-10 DIAGNOSIS — K76.6 PORTAL HYPERTENSIVE GASTROPATHY  (HCC): ICD-10-CM

## 2025-04-10 DIAGNOSIS — K74.60 CIRRHOSIS OF LIVER WITHOUT ASCITES, UNSPECIFIED HEPATIC CIRRHOSIS TYPE (HCC): Primary | ICD-10-CM

## 2025-04-10 DIAGNOSIS — K31.89 PORTAL HYPERTENSIVE GASTROPATHY  (HCC): ICD-10-CM

## 2025-04-10 PROCEDURE — 99214 OFFICE O/P EST MOD 30 MIN: CPT | Performed by: STUDENT IN AN ORGANIZED HEALTH CARE EDUCATION/TRAINING PROGRAM

## 2025-04-10 NOTE — ASSESSMENT & PLAN NOTE
69 year-old male with history of poorly controlled DM, HLD, HTN and cirrhosis who presents for follow up evaluation. He was last seen in September 2024.      He previously had a CT in 2021 which showed nodular liver morphology, ascites, and intraabdominal varices. Serologic work-up was negative for autoimmune liver disease and viral hepatitis. EGD/COY showed PHG but no esophageal varices and multiple TA. He was then lost to follow up.     He recently was evaluated by surgery for hernia repair and had imaging which showed macrolobulated contour with paraesophageal varices and normal spleen size. US elastography showed LSM 18.09 kPA, IQR 17% suggestive of F4 disease.      Etiology is likely HATCH given metabolic syndrome and negative serologic work-up, although ALP remains chronically elevated for unclear reasons. He does report 25 lb weight loss over the last few years and his A1c has drastically improved from 11.1 -> 7%.     During his last visit, we reviewed the natural history, prognosis and complications of cirrhosis. We had recommended an MRI hepatoma protocol as well as EGD to evaluate for CSPH given his weight loss. He has not completed these tests since his visit due to insurance issues. Would also recommend repeating a serologic work-up for PBC given his unexplained ALP and consider adding an EUS-guided liver biopsy for further work-up if the diagnosis remains uncertain.      Otherwise, he will return to clinic in 6 months or sooner if needed. Thank you for the opportunity to consult in his care.     1. Compensated cirrhosis  No symptomatic ascites, variceal bleeding, or hepatic encephalopathy.  - Etiology: [facility]   - MELD (3.0) 7 in June 2024; due for repeat MELD labas    2. Esophageal variceal surveillance:   His last EGD showed PHG but no varices 9/2021  - EGD ordered    3. HCC surveillance  - MRI hepatoma protocol ordered    4. Abnormal ALP  - MILTON, ASMA, AMA and quantitative immunoglobulins  - MRCP with  MRI    5. Colorectal cancer screening.  - COY ordered; consider Golytely prep given DM     6. Healthcare maintenance for patients with cirrhosis  -He was instructed to take no more than 2 grams of tylenol in 24 hours and no products containing NSAIDs, benzodiazapines, and narcotics.  -He was also instructed to avoid raw shellfish   -He should participate in daily exercise as to prevent loss of muscle mass  -He should abstain from all alcohol intake and was counseled on this.    -He is at risk for vitamin deficiencies and metabolic bone disease.   -HAV and HBV immunity will be checked and he should be vaccinated through his primary care provider if he is not immune.  -Additionally, he should receive a yearly flu shot and the pneumonia vaccine through his primary care provider.    I made him aware of the symptoms of hepatic decompensation: evident confusion, vomiting blood, black tarry stool, or large amounts of red blood in stool, and abdominal swelling. In addition to seeking local medical attention urgently, he will notify me if this happens before the next visit.    Carito Uriarte MD  Division of Gastroenterology and Hepatology  [facility]    Orders:    CBC and Platelet; Future    Comprehensive metabolic panel; Future    Protime-INR; Future    AFP tumor marker; Future    Antimitochondrial antibody; Future    MILTON Screen w/Reflex Cascade; Future    Anti-smooth muscle antibody, IgG; Future    IgG, IgA, IgM; Future    MRI abdomen w wo contrast and mrcp; Future

## 2025-04-10 NOTE — PATIENT INSTRUCTIONS
"Pt states he will schedule MRI Abdomen w wo Contrast and MRCP.  \"Central Scheduling\" phone number given to Pt at OV.  "

## 2025-04-10 NOTE — PROGRESS NOTES
[facility] - Outpatient Consultation  Bg Gruber 69 y.o. male MRN: [id number]  Encounter: [id number]    PCP:  Lea Reyes, MD, [phone]  Referring Provider:  No ref. provider found,     Name: Bg Gruber      : 1955      MRN: [id number]  Encounter Provider: Carito Uriarte MD  Encounter Date: 4/10/2025   Encounter department: [facility] [address]  :  Assessment & Plan  Cirrhosis of liver without ascites, unspecified hepatic cirrhosis type (HCC)   69 year-old male with history of poorly controlled DM, HLD, HTN and cirrhosis who presents for follow up evaluation. He was last seen in 2024.      He previously had a CT in  which showed nodular liver morphology, ascites, and intraabdominal varices. Serologic work-up was negative for autoimmune liver disease and viral hepatitis. EGD/COY showed PHG but no esophageal varices and multiple TA. He was then lost to follow up.     He recently was evaluated by surgery for hernia repair and had imaging which showed macrolobulated contour with paraesophageal varices and normal spleen size. US elastography showed LSM 18.09 kPA, IQR 17% suggestive of F4 disease.      Etiology is likely HATCH given metabolic syndrome and negative serologic work-up, although ALP remains chronically elevated for unclear reasons. He does report 25 lb weight loss over the last few years and his A1c has drastically improved from 11.1 -> 7%.     During his last visit, we reviewed the natural history, prognosis and complications of cirrhosis. We had recommended an MRI hepatoma protocol as well as EGD to evaluate for CSPH given his weight loss. He has not completed these tests since his visit due to insurance issues. Would also recommend repeating a serologic work-up for PBC given his unexplained ALP and consider adding an EUS-guided liver biopsy for further work-up if the diagnosis remains uncertain.      Otherwise, he will return  to clinic in 6 months or sooner if needed. Thank you for the opportunity to consult in his care.     1. Compensated cirrhosis  No symptomatic ascites, variceal bleeding, or hepatic encephalopathy.  - Etiology: MATIAS   - MELD (3.0) 7 in June 2024; due for repeat MELD labas    2. Esophageal variceal surveillance:   His last EGD showed PHG but no varices 9/2021  - EGD ordered    3. HCC surveillance  - MRI hepatoma protocol ordered    4. Abnormal ALP  - MILTON, ASMA, AMA and quantitative immunoglobulins  - MRCP with MRI    5. Colorectal cancer screening.  - COY ordered; consider Golytely prep given DM     6. Healthcare maintenance for patients with cirrhosis  -He was instructed to take no more than 2 grams of tylenol in 24 hours and no products containing NSAIDs, benzodiazapines, and narcotics.  -He was also instructed to avoid raw shellfish   -He should participate in daily exercise as to prevent loss of muscle mass  -He should abstain from all alcohol intake and was counseled on this.    -He is at risk for vitamin deficiencies and metabolic bone disease.   -HAV and HBV immunity will be checked and he should be vaccinated through his primary care provider if he is not immune.  -Additionally, he should receive a yearly flu shot and the pneumonia vaccine through his primary care provider.    I made him aware of the symptoms of hepatic decompensation: evident confusion, vomiting blood, black tarry stool, or large amounts of red blood in stool, and abdominal swelling. In addition to seeking local medical attention urgently, he will notify me if this happens before the next visit.    Carito Uriarte MD  Division of Gastroenterology and Hepatology  [facility]    Orders:    CBC and Platelet; Future    Comprehensive metabolic panel; Future    Protime-INR; Future    AFP tumor marker; Future    Antimitochondrial antibody; Future    MILTON Screen w/Reflex Cascade; Future    Anti-smooth muscle antibody, IgG;  "Future    IgG, IgA, IgM; Future    MRI abdomen w wo contrast and mrcp; Future        History of Present Illness   HPI  Bg Gruber is a 69 y.o. male with history of poorly controlled DM, HLD, HTN and cirrhosis who presents for follow up.    Interval events  - Did not complete labs, MRI and EGD/COY due to insurance issues  - ALP remains elevated with unclear cause. Reports occasional pruritus but localized   - Inguinal hernia is no longer bothering him     Extended liver history  He previously had a CT in 2021 which showed nodular liver morphology, ascites, and intraabdominal varices. Serologic work-up was negative for autoimmune liver disease and viral hepatitis. EGD/COY showed PHG but no esophageal varices and multiple TA. He was then lost to follow up.     He recently was evaluated by surgery for hernia repair and had imaging which showed macrolobulated contour with paraesophageal varices and normal spleen size. US elastography showed LSM 18.09 kPA, IQR 17% suggestive of F4 disease.      Etiology is likely HATCH given metabolic syndrome and negative serologic work-up. He does report 25 lb weight loss over the last few years and his A1c has drastically improved from 11.1 -> 7%.          Review of Systems   Constitutional: Negative.    HENT: Negative.     Eyes: Negative.    Respiratory: Negative.     Cardiovascular: Negative.    Gastrointestinal: Negative.    Endocrine: Negative.    Genitourinary: Negative.    Musculoskeletal: Negative.    Skin: Negative.    Allergic/Immunologic: Negative.    Neurological: Negative.    Hematological: Negative.    Psychiatric/Behavioral: Negative.            Objective   /88   Pulse 70   Ht 5' 8\" (1.727 m)   Wt 80.4 kg (177 lb 3.2 oz)   BMI 26.94 kg/m²      Physical Exam  Constitutional:       Appearance: Normal appearance.   HENT:      Head: Normocephalic and atraumatic.   Eyes:      General: No scleral icterus.     Extraocular Movements: Extraocular movements intact. "      Pupils: Pupils are equal, round, and reactive to light.   Abdominal:      General: Abdomen is flat. There is no distension.   Musculoskeletal:         General: Normal range of motion.   Skin:     General: Skin is warm and dry.   Neurological:      General: No focal deficit present.      Mental Status: He is alert and oriented to person, place, and time.       LABS/RADIOLOGY/ENDOSCOPY:  Lab Results   Component Value Date    WBC 6.05 10/25/2024    HGB 11.5 (L) 10/25/2024    HCT 34.8 (L) 10/25/2024     (L) 10/25/2024    GLUF 163 (H) 10/25/2024    BUN 23 10/25/2024    CREATININE 0.98 10/25/2024     04/20/2015    K 4.3 10/25/2024     10/25/2024    CO2 25 10/25/2024    PROT 7.5 04/20/2015    BILIDIR 0.40 (H) 09/15/2021    ALKPHOS 237 (H) 10/25/2024    ALT 15 10/25/2024    AST 30 10/25/2024    CALCIUM 8.5 10/25/2024    EGFR 78 10/25/2024    CHOL 172 04/20/2015    TRIG 76 10/25/2024    HDL 14 (L) 10/25/2024    INR 1.16 06/13/2024     (H) 06/13/2024     US elastography (6/26/2024)  Shear Wave Elastography sampling was performed to evaluate stiffness changes within the liver associated with fibrosis.  E1 18.56 kPa  E2 15.52 kPa  E3 14.42 kPa  E4 15.78 kPa  E5 14.19 kPa  E6 17.46 kPa  E7 18.29 kPa  E8 16.72 kPa  E9 20.53 kPa  E10 20.36 kPa     E median: 17.09 kPa.  The corresponding Metavir score is F4 (cirrhosis), > 11.87 kPa.  > 1.98 m/s.  IQR/median: 17% (IQR of less than 30% is considered a satisfactory data set    MELD 3.0: 7 at 6/13/2024  7:14 AM  MELD-Na: 8 at 6/13/2024  7:14 AM  Calculated from:  Serum Creatinine: 0.71 mg/dL (Using min of 1 mg/dL) at 6/13/2024  7:14 AM  Serum Sodium: 140 mmol/L (Using max of 137 mmol/L) at 6/13/2024  7:14 AM  Total Bilirubin: 0.92 mg/dL (Using min of 1 mg/dL) at 6/13/2024  7:14 AM  Serum Albumin: 3.6 g/dL (Using max of 3.5 g/dL) at 6/13/2024  7:14 AM  INR(ratio): 1.16 at 6/13/2024  7:14 AM  Age at listing (hypothetical): 68 years  Sex: Male at 6/13/2024   7:14 AM

## 2025-04-13 DIAGNOSIS — E11.65 POORLY CONTROLLED DIABETES MELLITUS (HCC): ICD-10-CM

## 2025-04-14 RX ORDER — METFORMIN HYDROCHLORIDE 500 MG/1
TABLET, EXTENDED RELEASE ORAL
Qty: 360 TABLET | Refills: 1 | Status: SHIPPED | OUTPATIENT
Start: 2025-04-14

## 2025-05-23 ENCOUNTER — APPOINTMENT (OUTPATIENT)
Dept: LAB | Facility: CLINIC | Age: 70
End: 2025-05-23
Payer: COMMERCIAL

## 2025-05-23 DIAGNOSIS — E11.65 POORLY CONTROLLED DIABETES MELLITUS (HCC): ICD-10-CM

## 2025-05-23 DIAGNOSIS — K74.60 CIRRHOSIS OF LIVER WITHOUT ASCITES, UNSPECIFIED HEPATIC CIRRHOSIS TYPE (HCC): ICD-10-CM

## 2025-05-23 DIAGNOSIS — R74.8 ELEVATED ALKALINE PHOSPHATASE LEVEL: ICD-10-CM

## 2025-05-23 LAB
AFP-TM SERPL-MCNC: 2.22 NG/ML (ref 0–9)
ALBUMIN SERPL BCG-MCNC: 4 G/DL (ref 3.5–5)
ALP SERPL-CCNC: 173 U/L (ref 34–104)
ALT SERPL W P-5'-P-CCNC: 21 U/L (ref 7–52)
ANION GAP SERPL CALCULATED.3IONS-SCNC: 8 MMOL/L (ref 4–13)
ANISOCYTOSIS BLD QL SMEAR: PRESENT
AST SERPL W P-5'-P-CCNC: 30 U/L (ref 13–39)
BASOPHILS # BLD AUTO: 0.06 THOUSANDS/ÂΜL (ref 0–0.1)
BASOPHILS NFR BLD AUTO: 1 % (ref 0–1)
BILIRUB SERPL-MCNC: 1.61 MG/DL (ref 0.2–1)
BUN SERPL-MCNC: 19 MG/DL (ref 5–25)
CALCIUM SERPL-MCNC: 9.5 MG/DL (ref 8.4–10.2)
CHLORIDE SERPL-SCNC: 106 MMOL/L (ref 96–108)
CHOLEST SERPL-MCNC: 99 MG/DL (ref ?–200)
CO2 SERPL-SCNC: 24 MMOL/L (ref 21–32)
CREAT SERPL-MCNC: 0.81 MG/DL (ref 0.6–1.3)
CREAT UR-MCNC: 74.8 MG/DL
EOSINOPHIL # BLD AUTO: 0.11 THOUSAND/ÂΜL (ref 0–0.61)
EOSINOPHIL NFR BLD AUTO: 3 % (ref 0–6)
ERYTHROCYTE [DISTWIDTH] IN BLOOD BY AUTOMATED COUNT: 15.3 % (ref 11.6–15.1)
EST. AVERAGE GLUCOSE BLD GHB EST-MCNC: 143 MG/DL
GFR SERPL CREATININE-BSD FRML MDRD: 91 ML/MIN/1.73SQ M
GLUCOSE P FAST SERPL-MCNC: 139 MG/DL (ref 65–99)
HBA1C MFR BLD: 6.6 %
HCT VFR BLD AUTO: 39 % (ref 36.5–49.3)
HDLC SERPL-MCNC: 40 MG/DL
HGB BLD-MCNC: 12.9 G/DL (ref 12–17)
IGA SERPL-MCNC: 396 MG/DL (ref 66–433)
IGG SERPL-MCNC: 1842 MG/DL (ref 635–1741)
IGM SERPL-MCNC: 108 MG/DL (ref 45–281)
IMM GRANULOCYTES # BLD AUTO: 0.01 THOUSAND/UL (ref 0–0.2)
IMM GRANULOCYTES NFR BLD AUTO: 0 % (ref 0–2)
INR PPP: 1.16 (ref 0.85–1.19)
LDLC SERPL CALC-MCNC: 46 MG/DL (ref 0–100)
LYMPHOCYTES # BLD AUTO: 1.48 THOUSANDS/ÂΜL (ref 0.6–4.47)
LYMPHOCYTES NFR BLD AUTO: 35 % (ref 14–44)
MACROCYTES BLD QL AUTO: PRESENT
MCH RBC QN AUTO: 33.9 PG (ref 26.8–34.3)
MCHC RBC AUTO-ENTMCNC: 33.1 G/DL (ref 31.4–37.4)
MCV RBC AUTO: 103 FL (ref 82–98)
MICROALBUMIN UR-MCNC: 35.3 MG/L
MICROALBUMIN/CREAT 24H UR: 47 MG/G CREATININE (ref 0–30)
MONOCYTES # BLD AUTO: 0.37 THOUSAND/ÂΜL (ref 0.17–1.22)
MONOCYTES NFR BLD AUTO: 9 % (ref 4–12)
NEUTROPHILS # BLD AUTO: 2.24 THOUSANDS/ÂΜL (ref 1.85–7.62)
NEUTS SEG NFR BLD AUTO: 52 % (ref 43–75)
NRBC BLD AUTO-RTO: 0 /100 WBCS
PLATELET BLD QL SMEAR: ABNORMAL
PMV BLD AUTO: 11 FL (ref 8.9–12.7)
POTASSIUM SERPL-SCNC: 4.2 MMOL/L (ref 3.5–5.3)
PROT SERPL-MCNC: 8.1 G/DL (ref 6.4–8.4)
PROTHROMBIN TIME: 15.1 SECONDS (ref 12.3–15)
RBC # BLD AUTO: 3.8 MILLION/UL (ref 3.88–5.62)
RBC MORPH BLD: PRESENT
SODIUM SERPL-SCNC: 138 MMOL/L (ref 135–147)
TRIGL SERPL-MCNC: 66 MG/DL (ref ?–150)
TSH SERPL DL<=0.05 MIU/L-ACNC: 2.81 UIU/ML (ref 0.45–4.5)
WBC # BLD AUTO: 4.27 THOUSAND/UL (ref 4.31–10.16)

## 2025-05-23 PROCEDURE — 80053 COMPREHEN METABOLIC PANEL: CPT

## 2025-05-23 PROCEDURE — 86038 ANTINUCLEAR ANTIBODIES: CPT

## 2025-05-23 PROCEDURE — 85610 PROTHROMBIN TIME: CPT

## 2025-05-23 PROCEDURE — 86381 MITOCHONDRIAL ANTIBODY EACH: CPT

## 2025-05-23 PROCEDURE — 85025 COMPLETE CBC W/AUTO DIFF WBC: CPT

## 2025-05-23 PROCEDURE — 84443 ASSAY THYROID STIM HORMONE: CPT

## 2025-05-23 PROCEDURE — 82784 ASSAY IGA/IGD/IGG/IGM EACH: CPT

## 2025-05-23 PROCEDURE — 83036 HEMOGLOBIN GLYCOSYLATED A1C: CPT

## 2025-05-23 PROCEDURE — 82043 UR ALBUMIN QUANTITATIVE: CPT

## 2025-05-23 PROCEDURE — 80061 LIPID PANEL: CPT

## 2025-05-23 PROCEDURE — 36415 COLL VENOUS BLD VENIPUNCTURE: CPT

## 2025-05-23 PROCEDURE — 82570 ASSAY OF URINE CREATININE: CPT

## 2025-05-23 PROCEDURE — 86015 ACTIN ANTIBODY EACH: CPT

## 2025-05-23 PROCEDURE — 86225 DNA ANTIBODY NATIVE: CPT

## 2025-05-23 PROCEDURE — 82105 ALPHA-FETOPROTEIN SERUM: CPT

## 2025-05-24 LAB
ACTIN IGG SERPL-ACNC: 8 UNITS (ref 0–19)
MITOCHONDRIA M2 IGG SER-ACNC: <20 UNITS (ref 0–20)

## 2025-05-27 ENCOUNTER — RESULTS FOLLOW-UP (OUTPATIENT)
Age: 70
End: 2025-05-27

## 2025-05-27 LAB
DSDNA IGG SERPL IA-ACNC: 0.9 IU/ML (ref ?–15)
NUCLEAR IGG SER IA-RTO: 0.4 RATIO (ref ?–1)

## 2025-05-27 NOTE — TELEPHONE ENCOUNTER
Spoke with pt. Relayed results and recommendations as per Dr Uriarte. Pt verbalized understanding and agreement with information. Denies questions or concerns at this time

## 2025-05-27 NOTE — TELEPHONE ENCOUNTER
----- Message from Carito Uriarte MD sent at 5/27/2025  3:12 PM EDT -----  Hi can we please let him know that his blood work was negative for autoimmune disease. His liver tests are improving and so would hold off on a liver biopsy at this time. He should repeat his MELD   labs and AFP in 6 months. Thanks!   ----- Message -----  From: Lab, Background User  Sent: 5/23/2025   4:19 PM EDT  To: Carito Uriarte MD

## 2025-05-29 ENCOUNTER — OFFICE VISIT (OUTPATIENT)
Dept: INTERNAL MEDICINE CLINIC | Facility: CLINIC | Age: 70
End: 2025-05-29
Payer: COMMERCIAL

## 2025-05-29 VITALS
DIASTOLIC BLOOD PRESSURE: 80 MMHG | WEIGHT: 175.2 LBS | SYSTOLIC BLOOD PRESSURE: 110 MMHG | HEART RATE: 67 BPM | OXYGEN SATURATION: 100 % | HEIGHT: 68 IN | TEMPERATURE: 96.9 F | BODY MASS INDEX: 26.55 KG/M2

## 2025-05-29 DIAGNOSIS — Z87.891 PERSONAL HISTORY OF NICOTINE DEPENDENCE: ICD-10-CM

## 2025-05-29 DIAGNOSIS — E11.65 POORLY CONTROLLED DIABETES MELLITUS (HCC): Primary | ICD-10-CM

## 2025-05-29 DIAGNOSIS — F17.200 CURRENT SMOKER: ICD-10-CM

## 2025-05-29 DIAGNOSIS — K74.60 CIRRHOSIS OF LIVER WITHOUT ASCITES, UNSPECIFIED HEPATIC CIRRHOSIS TYPE (HCC): ICD-10-CM

## 2025-05-29 DIAGNOSIS — K40.90 NON-RECURRENT UNILATERAL INGUINAL HERNIA WITHOUT OBSTRUCTION OR GANGRENE: ICD-10-CM

## 2025-05-29 DIAGNOSIS — I10 BENIGN ESSENTIAL HYPERTENSION: ICD-10-CM

## 2025-05-29 PROCEDURE — 99214 OFFICE O/P EST MOD 30 MIN: CPT | Performed by: INTERNAL MEDICINE

## 2025-05-29 RX ORDER — METFORMIN HYDROCHLORIDE 500 MG/1
1000 TABLET, EXTENDED RELEASE ORAL
Start: 2025-05-29

## 2025-05-29 NOTE — ASSESSMENT & PLAN NOTE
He saw Dr. Lopez last summer but A1c was very high then  Unsure about having surgery anytime soon

## 2025-05-29 NOTE — ASSESSMENT & PLAN NOTE
Continue current medications  Will apply for patient assistance for Jardiance  Lab Results   Component Value Date    HGBA1C 6.6 (H) 05/23/2025       Orders:  •  Empagliflozin (Jardiance) 10 MG TABS tablet; Take 1 tablet (10 mg total) by mouth daily (Patient not taking: Reported on 5/29/2025)  •  Hemoglobin A1C; Future  •  Comprehensive metabolic panel; Future  •  CBC and differential; Future  •  Albumin / creatinine urine ratio; Future  •  Lipid Panel with Direct LDL reflex; Future  •  Ambulatory Referral to Social Work Care Management Program; Future  •  metFORMIN (GLUCOPHAGE-XR) 500 mg 24 hr tablet; Take 2 tablets (1,000 mg total) by mouth daily with dinner

## 2025-05-29 NOTE — PROGRESS NOTES
Name: Bg Gruber      : 1955      MRN: [id number]  Encounter Provider: Lea Reyes, MD  Encounter Date: 2025   Encounter department: [facility]  :  Assessment & Plan  Poorly controlled diabetes mellitus (HCC)  Continue current medications  Will apply for patient assistance for Jardiance  Lab Results   Component Value Date    HGBA1C 6.6 (H) 2025       Orders:  •  Empagliflozin (Jardiance) 10 MG TABS tablet; Take 1 tablet (10 mg total) by mouth daily (Patient not taking: Reported on 2025)  •  Hemoglobin A1C; Future  •  Comprehensive metabolic panel; Future  •  CBC and differential; Future  •  Albumin / creatinine urine ratio; Future  •  Lipid Panel with Direct LDL reflex; Future  •  Ambulatory Referral to Social Work Care Management Program; Future  •  metFORMIN (GLUCOPHAGE-XR) 500 mg 24 hr tablet; Take 2 tablets (1,000 mg total) by mouth daily with dinner    Non-recurrent unilateral inguinal hernia without obstruction or gangrene  He saw Dr. Lopez last summer but A1c was very high then  Unsure about having surgery anytime soon       Benign essential hypertension  Controlled on metoprolol alone  Intolerant to ACE inhibitors and ARB       Cirrhosis of liver without ascites, unspecified hepatic cirrhosis type (HCC)  Denies any significant alcohol use.  It does not seem to be autoimmune based on recent serologies  Likely from metabolic syndrome especially since he is diabetic  Scheduled for MRI and following up with gastro       Current smoker  Reducing smoking on his own  Agrees to CT  Orders:  •  CT Lung Screening Program; Future    Personal history of nicotine dependence    Orders:  •  CT Lung Screening Program; Future           History of Present Illness   Here for a follow up  A1C 6.6. He is on metformin 1g daily only and Lantus 40units at bedtime. He is unable to afford Jardiance  Checks BS BID, fasting usually <150 and at bedtime 160-170s. Denies hypoglycemic  "episodes  Seeing gastroenterology for liver cirrhosis  He reports that his blood work for autoimmune diseases was normal.  He is scheduled to get an MRI          Review of Systems   Constitutional:  Negative for unexpected weight change.   Respiratory:  Negative for shortness of breath.    Cardiovascular:  Negative for chest pain and palpitations.   Gastrointestinal:  Negative for abdominal pain, constipation and diarrhea.       Objective   /80 (BP Location: Left arm, Patient Position: Sitting, Cuff Size: Large)   Pulse 67   Temp (!) 96.9 °F (36.1 °C) (Tympanic Core)   Ht 5' 8\" (1.727 m)   Wt 79.5 kg (175 lb 3.2 oz)   SpO2 100%   BMI 26.64 kg/m²      Physical Exam  Constitutional:       Appearance: He is well-developed. He is ill-appearing.     Eyes:      Conjunctiva/sclera: Conjunctivae normal.       Cardiovascular:      Rate and Rhythm: Normal rate and regular rhythm.      Heart sounds: Normal heart sounds. No murmur heard.  Pulmonary:      Effort: Pulmonary effort is normal. No respiratory distress.      Breath sounds: Decreased breath sounds present. No wheezing or rales.   Abdominal:      General: There is no distension.      Palpations: Abdomen is soft. There is hepatomegaly. There is no mass.      Tenderness: There is no abdominal tenderness. There is no guarding or rebound.      Hernia: A hernia is present. Hernia is present in the right inguinal area.     Musculoskeletal:      Cervical back: Neck supple.      Right lower leg: No edema.      Left lower leg: No edema.     Skin:     General: Skin is dry.     Neurological:      Mental Status: He is alert and oriented to person, place, and time.     Psychiatric:         Mood and Affect: Mood normal.         Behavior: Behavior normal.         Thought Content: Thought content normal.         Judgment: Judgment normal.         "

## 2025-05-29 NOTE — ASSESSMENT & PLAN NOTE
Denies any significant alcohol use.  It does not seem to be autoimmune based on recent serologies  Likely from metabolic syndrome especially since he is diabetic  Scheduled for MRI and following up with gastro

## 2025-05-30 ENCOUNTER — PATIENT OUTREACH (OUTPATIENT)
Dept: CASE MANAGEMENT | Facility: OTHER | Age: 70
End: 2025-05-30

## 2025-05-30 NOTE — PROGRESS NOTES
Received referral from patients PCP Lea Reyes, MD requesting that [facility] outreach patient and assess for needs. Per chart review, patient had PCP follow up visit on 5/29. Reported needing assistance with medication costs for Jardiance.     Attempted outreaching patient, no answer and left message for return call.

## 2025-06-02 ENCOUNTER — PATIENT OUTREACH (OUTPATIENT)
Dept: CASE MANAGEMENT | Facility: OTHER | Age: 70
End: 2025-06-02

## 2025-06-02 NOTE — PROGRESS NOTES
Received return call from patient but SWCM was unable to answer due to being on another call. Patient did not leave message.     SWCM returning call. No answer and left message.

## 2025-06-05 ENCOUNTER — PATIENT OUTREACH (OUTPATIENT)
Dept: CASE MANAGEMENT | Facility: OTHER | Age: 70
End: 2025-06-05

## 2025-06-05 NOTE — PROGRESS NOTES
2nd attempt outreaching patient to assess for needs. Per chart review, patient had PCP follow up visit on 5/29. Reported needing assistance with medication costs for Jardiance. No answer and left message. UTR letter was sent.

## 2025-06-05 NOTE — LETTER
[address] [facility] [address]  [phone]    Re: Unable to reach   6/5/2025       Dear Bg,    I am a [facility]  and wanted to be certain you had information to contact me should you desire assistance with or have questions about non-medical aspects of your care such as [but not limited to] medical insurance, housing, transportation, material needs, or emergency needs.  If I do not have an answer I will assist you in finding the appropriate agency or individual who can help.      Please feel free to contact me at [phone]. Thank You.    Sincerely,         Marga Macdonald

## 2025-07-19 DIAGNOSIS — E11.65 POORLY CONTROLLED DIABETES MELLITUS (HCC): ICD-10-CM

## 2025-07-20 RX ORDER — METOPROLOL SUCCINATE 100 MG/1
100 TABLET, EXTENDED RELEASE ORAL DAILY
Qty: 100 TABLET | Refills: 3 | Status: SHIPPED | OUTPATIENT
Start: 2025-07-20

## 2025-07-29 ENCOUNTER — HOSPITAL ENCOUNTER (EMERGENCY)
Facility: HOSPITAL | Age: 70
Discharge: HOME | End: 2025-07-29
Attending: EMERGENCY MEDICINE
Payer: COMMERCIAL

## 2025-07-29 VITALS
HEART RATE: 71 BPM | SYSTOLIC BLOOD PRESSURE: 164 MMHG | TEMPERATURE: 98 F | DIASTOLIC BLOOD PRESSURE: 79 MMHG | OXYGEN SATURATION: 95 % | RESPIRATION RATE: 20 BRPM

## 2025-07-29 DIAGNOSIS — R73.9 HYPERGLYCEMIA: Primary | ICD-10-CM

## 2025-07-29 LAB
ALBUMIN SERPL BCG-MCNC: 3.8 G/DL (ref 3.5–5)
ALP SERPL-CCNC: 127 U/L (ref 34–104)
ALT SERPL W P-5'-P-CCNC: 15 U/L (ref 7–52)
ANION GAP SERPL CALCULATED.3IONS-SCNC: 6 MMOL/L (ref 4–13)
AST SERPL W P-5'-P-CCNC: 19 U/L (ref 13–39)
ATRIAL RATE: 73 BPM
BASE EX.OXY STD BLDV CALC-SCNC: 51 % (ref 60–80)
BASE EXCESS BLDV CALC-SCNC: -2.1 MMOL/L
BASOPHILS # BLD AUTO: 0.03 THOUSANDS/ÂΜL (ref 0–0.1)
BASOPHILS NFR BLD AUTO: 1 % (ref 0–1)
BILIRUB SERPL-MCNC: 1.16 MG/DL (ref 0.2–1)
BILIRUB UR QL STRIP: NEGATIVE
BUN SERPL-MCNC: 17 MG/DL (ref 5–25)
CALCIUM SERPL-MCNC: 9.3 MG/DL (ref 8.4–10.2)
CARDIAC TROPONIN I PNL SERPL HS: <2 NG/L (ref ?–50)
CARDIAC TROPONIN I PNL SERPL HS: <2 NG/L (ref ?–50)
CHLORIDE SERPL-SCNC: 107 MMOL/L (ref 96–108)
CLARITY UR: CLEAR
CO2 SERPL-SCNC: 23 MMOL/L (ref 21–32)
COLOR UR: NORMAL
CREAT SERPL-MCNC: 0.79 MG/DL (ref 0.6–1.3)
EOSINOPHIL # BLD AUTO: 0.05 THOUSAND/ÂΜL (ref 0–0.61)
EOSINOPHIL NFR BLD AUTO: 1 % (ref 0–6)
ERYTHROCYTE [DISTWIDTH] IN BLOOD BY AUTOMATED COUNT: 15 % (ref 11.6–15.1)
GFR SERPL CREATININE-BSD FRML MDRD: 92 ML/MIN/1.73SQ M
GLUCOSE SERPL-MCNC: 179 MG/DL (ref 65–140)
GLUCOSE SERPL-MCNC: 200 MG/DL (ref 65–140)
GLUCOSE SERPL-MCNC: 201 MG/DL (ref 65–140)
GLUCOSE UR STRIP-MCNC: NEGATIVE MG/DL
HCO3 BLDV-SCNC: 22.9 MMOL/L (ref 24–30)
HCT VFR BLD AUTO: 37.7 % (ref 36.5–49.3)
HGB BLD-MCNC: 12.5 G/DL (ref 12–17)
HGB UR QL STRIP.AUTO: NEGATIVE
IMM GRANULOCYTES # BLD AUTO: 0.02 THOUSAND/UL (ref 0–0.2)
IMM GRANULOCYTES NFR BLD AUTO: 0 % (ref 0–2)
KETONES UR STRIP-MCNC: NEGATIVE MG/DL
LEUKOCYTE ESTERASE UR QL STRIP: NEGATIVE
LYMPHOCYTES # BLD AUTO: 0.88 THOUSANDS/ÂΜL (ref 0.6–4.47)
LYMPHOCYTES NFR BLD AUTO: 16 % (ref 14–44)
MCH RBC QN AUTO: 34.2 PG (ref 26.8–34.3)
MCHC RBC AUTO-ENTMCNC: 33.2 G/DL (ref 31.4–37.4)
MCV RBC AUTO: 103 FL (ref 82–98)
MONOCYTES # BLD AUTO: 0.54 THOUSAND/ÂΜL (ref 0.17–1.22)
MONOCYTES NFR BLD AUTO: 10 % (ref 4–12)
NEUTROPHILS # BLD AUTO: 3.88 THOUSANDS/ÂΜL (ref 1.85–7.62)
NEUTS SEG NFR BLD AUTO: 72 % (ref 43–75)
NITRITE UR QL STRIP: NEGATIVE
NRBC BLD AUTO-RTO: 0 /100 WBCS
O2 CT BLDV-SCNC: 9.9 ML/DL
P AXIS: 73 DEGREES
PCO2 BLDV: 40.4 MM HG (ref 42–50)
PH BLDV: 7.37 [PH] (ref 7.3–7.4)
PH UR STRIP.AUTO: 5.5 [PH]
PLATELET # BLD AUTO: 85 THOUSANDS/UL (ref 149–390)
PMV BLD AUTO: 10.1 FL (ref 8.9–12.7)
PO2 BLDV: 28.2 MM HG (ref 35–45)
POTASSIUM SERPL-SCNC: 4 MMOL/L (ref 3.5–5.3)
PR INTERVAL: 150 MS
PROT SERPL-MCNC: 7.5 G/DL (ref 6.4–8.4)
PROT UR STRIP-MCNC: NEGATIVE MG/DL
QRS AXIS: 84 DEGREES
QRSD INTERVAL: 78 MS
QT INTERVAL: 386 MS
QTC INTERVAL: 425 MS
RBC # BLD AUTO: 3.65 MILLION/UL (ref 3.88–5.62)
SODIUM SERPL-SCNC: 136 MMOL/L (ref 135–147)
SP GR UR STRIP.AUTO: 1.01 (ref 1–1.03)
T WAVE AXIS: 44 DEGREES
UROBILINOGEN UR STRIP-ACNC: <2 MG/DL
VENTRICULAR RATE: 73 BPM
WBC # BLD AUTO: 5.4 THOUSAND/UL (ref 4.31–10.16)

## 2025-07-29 PROCEDURE — 84484 ASSAY OF TROPONIN QUANT: CPT | Performed by: EMERGENCY MEDICINE

## 2025-07-29 PROCEDURE — 99285 EMERGENCY DEPT VISIT HI MDM: CPT | Performed by: EMERGENCY MEDICINE

## 2025-07-29 PROCEDURE — 81003 URINALYSIS AUTO W/O SCOPE: CPT | Performed by: EMERGENCY MEDICINE

## 2025-07-29 PROCEDURE — 85025 COMPLETE CBC W/AUTO DIFF WBC: CPT

## 2025-07-29 PROCEDURE — 82805 BLOOD GASES W/O2 SATURATION: CPT

## 2025-07-29 PROCEDURE — 96365 THER/PROPH/DIAG IV INF INIT: CPT

## 2025-07-29 PROCEDURE — 36415 COLL VENOUS BLD VENIPUNCTURE: CPT

## 2025-07-29 PROCEDURE — 80053 COMPREHEN METABOLIC PANEL: CPT

## 2025-07-29 PROCEDURE — 99285 EMERGENCY DEPT VISIT HI MDM: CPT

## 2025-07-29 PROCEDURE — 82948 REAGENT STRIP/BLOOD GLUCOSE: CPT

## 2025-07-29 PROCEDURE — 93005 ELECTROCARDIOGRAM TRACING: CPT

## 2025-07-29 PROCEDURE — 93010 ELECTROCARDIOGRAM REPORT: CPT | Performed by: INTERNAL MEDICINE

## 2025-07-29 RX ORDER — SODIUM CHLORIDE, SODIUM GLUCONATE, SODIUM ACETATE, POTASSIUM CHLORIDE, MAGNESIUM CHLORIDE, SODIUM PHOSPHATE, DIBASIC, AND POTASSIUM PHOSPHATE .53; .5; .37; .037; .03; .012; .00082 G/100ML; G/100ML; G/100ML; G/100ML; G/100ML; G/100ML; G/100ML
1000 INJECTION, SOLUTION INTRAVENOUS ONCE
Status: COMPLETED | OUTPATIENT
Start: 2025-07-29 | End: 2025-07-29

## 2025-07-29 RX ADMIN — SODIUM CHLORIDE, SODIUM GLUCONATE, SODIUM ACETATE, POTASSIUM CHLORIDE, MAGNESIUM CHLORIDE, SODIUM PHOSPHATE, DIBASIC, AND POTASSIUM PHOSPHATE 1000 ML: .53; .5; .37; .037; .03; .012; .00082 INJECTION, SOLUTION INTRAVENOUS at 09:06

## 2025-07-31 ENCOUNTER — OFFICE VISIT (OUTPATIENT)
Dept: INTERNAL MEDICINE CLINIC | Facility: CLINIC | Age: 70
End: 2025-07-31
Payer: COMMERCIAL

## 2025-07-31 VITALS
BODY MASS INDEX: 26.3 KG/M2 | DIASTOLIC BLOOD PRESSURE: 80 MMHG | SYSTOLIC BLOOD PRESSURE: 192 MMHG | WEIGHT: 173 LBS | HEART RATE: 69 BPM | OXYGEN SATURATION: 100 %

## 2025-07-31 DIAGNOSIS — I10 BENIGN ESSENTIAL HYPERTENSION: Primary | ICD-10-CM

## 2025-07-31 PROCEDURE — 99213 OFFICE O/P EST LOW 20 MIN: CPT | Performed by: INTERNAL MEDICINE

## 2025-07-31 RX ORDER — AMLODIPINE BESYLATE 5 MG/1
5 TABLET ORAL DAILY
Qty: 90 TABLET | Refills: 0 | Status: SHIPPED | OUTPATIENT
Start: 2025-07-31

## 2025-08-04 LAB
LEFT EYE DIABETIC RETINOPATHY: NORMAL
RIGHT EYE DIABETIC RETINOPATHY: NORMAL

## 2025-08-14 ENCOUNTER — OFFICE VISIT (OUTPATIENT)
Dept: INTERNAL MEDICINE CLINIC | Facility: CLINIC | Age: 70
End: 2025-08-14
Payer: COMMERCIAL